# Patient Record
Sex: MALE | Race: WHITE | NOT HISPANIC OR LATINO
[De-identification: names, ages, dates, MRNs, and addresses within clinical notes are randomized per-mention and may not be internally consistent; named-entity substitution may affect disease eponyms.]

---

## 2017-03-09 ENCOUNTER — APPOINTMENT (OUTPATIENT)
Dept: PULMONOLOGY | Facility: CLINIC | Age: 77
End: 2017-03-09

## 2017-03-09 VITALS
SYSTOLIC BLOOD PRESSURE: 130 MMHG | HEART RATE: 86 BPM | RESPIRATION RATE: 16 BRPM | DIASTOLIC BLOOD PRESSURE: 70 MMHG | OXYGEN SATURATION: 95 %

## 2017-05-24 ENCOUNTER — APPOINTMENT (OUTPATIENT)
Dept: PULMONOLOGY | Facility: CLINIC | Age: 77
End: 2017-05-24

## 2017-05-24 VITALS — DIASTOLIC BLOOD PRESSURE: 80 MMHG | SYSTOLIC BLOOD PRESSURE: 122 MMHG

## 2017-05-24 VITALS — BODY MASS INDEX: 25.54 KG/M2 | WEIGHT: 178 LBS

## 2017-05-24 VITALS — OXYGEN SATURATION: 97 %

## 2017-05-24 RX ORDER — GALANTAMINE 8 MG/1
8 CAPSULE, EXTENDED RELEASE ORAL
Qty: 90 | Refills: 0 | Status: ACTIVE | COMMUNITY
Start: 2017-01-09

## 2017-05-24 RX ORDER — OFLOXACIN 3 MG/ML
0.3 SOLUTION/ DROPS OPHTHALMIC
Qty: 10 | Refills: 0 | Status: DISCONTINUED | COMMUNITY
Start: 2017-02-21

## 2017-05-24 RX ORDER — NEOMYCIN SULFATE, POLYMYXIN B SULFATE AND DEXAMETHASONE 3.5; 10000; 1 MG/ML; [USP'U]/ML; MG/ML
3.5-10000-0.1 SUSPENSION OPHTHALMIC
Qty: 5 | Refills: 0 | Status: DISCONTINUED | COMMUNITY
Start: 2017-02-21

## 2017-05-24 RX ORDER — PREDNISONE 50 MG/1
50 TABLET ORAL
Qty: 5 | Refills: 0 | Status: DISCONTINUED | COMMUNITY
Start: 2017-03-09 | End: 2017-05-24

## 2017-05-24 RX ORDER — NEOMYCIN AND POLYMYXIN B SULFATES AND DEXAMETHASONE 3.5; 10000; 1 MG/G; [IU]/G; MG/G
3.5-10000-0.1 OINTMENT OPHTHALMIC
Qty: 35 | Refills: 0 | Status: DISCONTINUED | COMMUNITY
Start: 2017-02-17

## 2017-05-24 RX ORDER — KETOROLAC TROMETHAMINE 5 MG/ML
0.5 SOLUTION OPHTHALMIC
Qty: 5 | Refills: 0 | Status: DISCONTINUED | COMMUNITY
Start: 2017-02-21

## 2017-05-24 RX ORDER — ERYTHROMYCIN 5 MG/G
5 OINTMENT OPHTHALMIC
Qty: 7 | Refills: 0 | Status: DISCONTINUED | COMMUNITY
Start: 2017-02-16

## 2017-09-22 ENCOUNTER — APPOINTMENT (OUTPATIENT)
Dept: PULMONOLOGY | Facility: CLINIC | Age: 77
End: 2017-09-22

## 2017-09-28 ENCOUNTER — APPOINTMENT (OUTPATIENT)
Dept: PULMONOLOGY | Facility: CLINIC | Age: 77
End: 2017-09-28
Payer: MEDICARE

## 2017-09-28 VITALS — BODY MASS INDEX: 24.68 KG/M2 | WEIGHT: 172 LBS | DIASTOLIC BLOOD PRESSURE: 80 MMHG | SYSTOLIC BLOOD PRESSURE: 132 MMHG

## 2017-09-28 PROCEDURE — 99214 OFFICE O/P EST MOD 30 MIN: CPT

## 2017-11-29 ENCOUNTER — APPOINTMENT (OUTPATIENT)
Dept: PULMONOLOGY | Facility: CLINIC | Age: 77
End: 2017-11-29
Payer: MEDICARE

## 2017-11-29 VITALS — DIASTOLIC BLOOD PRESSURE: 60 MMHG | HEART RATE: 85 BPM | SYSTOLIC BLOOD PRESSURE: 118 MMHG

## 2017-11-29 VITALS — WEIGHT: 171 LBS | BODY MASS INDEX: 24.54 KG/M2

## 2017-11-29 DIAGNOSIS — Z01.818 ENCOUNTER FOR OTHER PREPROCEDURAL EXAMINATION: ICD-10-CM

## 2017-11-29 PROCEDURE — 94010 BREATHING CAPACITY TEST: CPT

## 2017-11-29 PROCEDURE — 99214 OFFICE O/P EST MOD 30 MIN: CPT | Mod: 25

## 2017-11-29 RX ORDER — TIOTROPIUM BROMIDE 18 UG/1
CAPSULE ORAL; RESPIRATORY (INHALATION)
Refills: 0 | Status: ACTIVE | COMMUNITY

## 2018-04-17 ENCOUNTER — RX RENEWAL (OUTPATIENT)
Age: 78
End: 2018-04-17

## 2018-05-30 ENCOUNTER — APPOINTMENT (OUTPATIENT)
Dept: PULMONOLOGY | Facility: CLINIC | Age: 78
End: 2018-05-30
Payer: MEDICARE

## 2018-05-30 VITALS
OXYGEN SATURATION: 93 % | HEART RATE: 87 BPM | HEIGHT: 70 IN | BODY MASS INDEX: 23.91 KG/M2 | SYSTOLIC BLOOD PRESSURE: 118 MMHG | DIASTOLIC BLOOD PRESSURE: 70 MMHG | WEIGHT: 167 LBS

## 2018-05-30 PROCEDURE — 99214 OFFICE O/P EST MOD 30 MIN: CPT

## 2018-07-30 RX ORDER — ALBUTEROL SULFATE 108 UG/1
108 (90 BASE) AEROSOL, METERED RESPIRATORY (INHALATION)
Qty: 1 | Refills: 5 | Status: DISCONTINUED | COMMUNITY
Start: 2017-03-09 | End: 2018-07-30

## 2018-11-26 ENCOUNTER — APPOINTMENT (OUTPATIENT)
Dept: PULMONOLOGY | Facility: CLINIC | Age: 78
End: 2018-11-26
Payer: MEDICARE

## 2018-11-26 VITALS — DIASTOLIC BLOOD PRESSURE: 60 MMHG | SYSTOLIC BLOOD PRESSURE: 120 MMHG | WEIGHT: 157 LBS | BODY MASS INDEX: 22.53 KG/M2

## 2018-11-26 VITALS — HEART RATE: 83 BPM | OXYGEN SATURATION: 94 %

## 2018-11-26 PROCEDURE — 99214 OFFICE O/P EST MOD 30 MIN: CPT

## 2019-05-13 ENCOUNTER — RX RENEWAL (OUTPATIENT)
Age: 79
End: 2019-05-13

## 2019-05-13 RX ORDER — ALBUTEROL SULFATE 90 UG/1
108 (90 BASE) AEROSOL, METERED RESPIRATORY (INHALATION) EVERY 4 HOURS
Qty: 3 | Refills: 1 | Status: ACTIVE | COMMUNITY
Start: 2018-04-17 | End: 1900-01-01

## 2019-05-22 ENCOUNTER — APPOINTMENT (OUTPATIENT)
Dept: PULMONOLOGY | Facility: CLINIC | Age: 79
End: 2019-05-22

## 2019-06-20 ENCOUNTER — INPATIENT (INPATIENT)
Facility: HOSPITAL | Age: 79
LOS: 0 days | Discharge: HOME | DRG: 641 | End: 2019-06-21
Attending: INTERNAL MEDICINE | Admitting: HOSPITALIST
Payer: COMMERCIAL

## 2019-06-20 VITALS
HEIGHT: 71 IN | HEART RATE: 73 BPM | WEIGHT: 179.9 LBS | RESPIRATION RATE: 16 BRPM | DIASTOLIC BLOOD PRESSURE: 80 MMHG | OXYGEN SATURATION: 94 % | SYSTOLIC BLOOD PRESSURE: 121 MMHG | TEMPERATURE: 98 F

## 2019-06-20 DIAGNOSIS — R41.82 ALTERED MENTAL STATUS, UNSPECIFIED: ICD-10-CM

## 2019-06-20 LAB
ALBUMIN SERPL ELPH-MCNC: 3.9 G/DL — SIGNIFICANT CHANGE UP (ref 3.3–5.2)
ALP SERPL-CCNC: 100 U/L — SIGNIFICANT CHANGE UP (ref 40–120)
ALT FLD-CCNC: 15 U/L — SIGNIFICANT CHANGE UP
ANION GAP SERPL CALC-SCNC: 9 MMOL/L — SIGNIFICANT CHANGE UP (ref 5–17)
APPEARANCE UR: CLEAR — SIGNIFICANT CHANGE UP
APTT BLD: 37.3 SEC — HIGH (ref 27.5–36.3)
AST SERPL-CCNC: 16 U/L — SIGNIFICANT CHANGE UP
BASOPHILS # BLD AUTO: 0 K/UL — SIGNIFICANT CHANGE UP (ref 0–0.2)
BASOPHILS NFR BLD AUTO: 0.2 % — SIGNIFICANT CHANGE UP (ref 0–2)
BILIRUB SERPL-MCNC: 0.4 MG/DL — SIGNIFICANT CHANGE UP (ref 0.4–2)
BILIRUB UR-MCNC: NEGATIVE — SIGNIFICANT CHANGE UP
BUN SERPL-MCNC: 28 MG/DL — HIGH (ref 8–20)
CALCIUM SERPL-MCNC: 10.4 MG/DL — HIGH (ref 8.6–10.2)
CHLORIDE SERPL-SCNC: 104 MMOL/L — SIGNIFICANT CHANGE UP (ref 98–107)
CO2 SERPL-SCNC: 30 MMOL/L — HIGH (ref 22–29)
COLOR SPEC: YELLOW — SIGNIFICANT CHANGE UP
CREAT SERPL-MCNC: 0.96 MG/DL — SIGNIFICANT CHANGE UP (ref 0.5–1.3)
DIFF PNL FLD: NEGATIVE — SIGNIFICANT CHANGE UP
EOSINOPHIL # BLD AUTO: 0.2 K/UL — SIGNIFICANT CHANGE UP (ref 0–0.5)
EOSINOPHIL NFR BLD AUTO: 2 % — SIGNIFICANT CHANGE UP (ref 0–5)
GAS PNL BLDV: SIGNIFICANT CHANGE UP
GLUCOSE BLDC GLUCOMTR-MCNC: 88 MG/DL — SIGNIFICANT CHANGE UP (ref 70–99)
GLUCOSE SERPL-MCNC: 94 MG/DL — SIGNIFICANT CHANGE UP (ref 70–115)
GLUCOSE UR QL: NEGATIVE MG/DL — SIGNIFICANT CHANGE UP
HCT VFR BLD CALC: 37.6 % — LOW (ref 42–52)
HGB BLD-MCNC: 11.9 G/DL — LOW (ref 14–18)
INR BLD: 1.07 RATIO — SIGNIFICANT CHANGE UP (ref 0.88–1.16)
KETONES UR-MCNC: NEGATIVE — SIGNIFICANT CHANGE UP
LEUKOCYTE ESTERASE UR-ACNC: NEGATIVE — SIGNIFICANT CHANGE UP
LYMPHOCYTES # BLD AUTO: 1.2 K/UL — SIGNIFICANT CHANGE UP (ref 1–4.8)
LYMPHOCYTES # BLD AUTO: 13.6 % — LOW (ref 20–55)
MCHC RBC-ENTMCNC: 29.7 PG — SIGNIFICANT CHANGE UP (ref 27–31)
MCHC RBC-ENTMCNC: 31.6 G/DL — LOW (ref 32–36)
MCV RBC AUTO: 93.8 FL — SIGNIFICANT CHANGE UP (ref 80–94)
MONOCYTES # BLD AUTO: 0.8 K/UL — SIGNIFICANT CHANGE UP (ref 0–0.8)
MONOCYTES NFR BLD AUTO: 9 % — SIGNIFICANT CHANGE UP (ref 3–10)
NEUTROPHILS # BLD AUTO: 6.4 K/UL — SIGNIFICANT CHANGE UP (ref 1.8–8)
NEUTROPHILS NFR BLD AUTO: 75 % — HIGH (ref 37–73)
NITRITE UR-MCNC: NEGATIVE — SIGNIFICANT CHANGE UP
PH UR: 6.5 — SIGNIFICANT CHANGE UP (ref 5–8)
PLATELET # BLD AUTO: 163 K/UL — SIGNIFICANT CHANGE UP (ref 150–400)
POTASSIUM SERPL-MCNC: 5 MMOL/L — SIGNIFICANT CHANGE UP (ref 3.5–5.3)
POTASSIUM SERPL-SCNC: 5 MMOL/L — SIGNIFICANT CHANGE UP (ref 3.5–5.3)
PROT SERPL-MCNC: 6.2 G/DL — LOW (ref 6.6–8.7)
PROT UR-MCNC: NEGATIVE MG/DL — SIGNIFICANT CHANGE UP
PROTHROM AB SERPL-ACNC: 12.3 SEC — SIGNIFICANT CHANGE UP (ref 10–12.9)
RBC # BLD: 4.01 M/UL — LOW (ref 4.6–6.2)
RBC # FLD: 16.5 % — HIGH (ref 11–15.6)
SODIUM SERPL-SCNC: 143 MMOL/L — SIGNIFICANT CHANGE UP (ref 135–145)
SP GR SPEC: 1.01 — SIGNIFICANT CHANGE UP (ref 1.01–1.02)
UROBILINOGEN FLD QL: NEGATIVE MG/DL — SIGNIFICANT CHANGE UP
WBC # BLD: 8.5 K/UL — SIGNIFICANT CHANGE UP (ref 4.8–10.8)
WBC # FLD AUTO: 8.5 K/UL — SIGNIFICANT CHANGE UP (ref 4.8–10.8)

## 2019-06-20 PROCEDURE — 93010 ELECTROCARDIOGRAM REPORT: CPT

## 2019-06-20 PROCEDURE — 71045 X-RAY EXAM CHEST 1 VIEW: CPT | Mod: 26

## 2019-06-20 PROCEDURE — 99285 EMERGENCY DEPT VISIT HI MDM: CPT | Mod: GC

## 2019-06-20 PROCEDURE — 70450 CT HEAD/BRAIN W/O DYE: CPT | Mod: 26

## 2019-06-20 PROCEDURE — 99223 1ST HOSP IP/OBS HIGH 75: CPT | Mod: GC

## 2019-06-20 RX ORDER — ASPIRIN/CALCIUM CARB/MAGNESIUM 324 MG
81 TABLET ORAL ONCE
Refills: 0 | Status: COMPLETED | OUTPATIENT
Start: 2019-06-20 | End: 2019-06-20

## 2019-06-20 RX ORDER — SODIUM CHLORIDE 9 MG/ML
1000 INJECTION INTRAMUSCULAR; INTRAVENOUS; SUBCUTANEOUS ONCE
Refills: 0 | Status: COMPLETED | OUTPATIENT
Start: 2019-06-20 | End: 2019-06-20

## 2019-06-20 RX ADMIN — Medication 81 MILLIGRAM(S): at 21:24

## 2019-06-20 RX ADMIN — SODIUM CHLORIDE 1000 MILLILITER(S): 9 INJECTION INTRAMUSCULAR; INTRAVENOUS; SUBCUTANEOUS at 17:43

## 2019-06-20 RX ADMIN — SODIUM CHLORIDE 1000 MILLILITER(S): 9 INJECTION INTRAMUSCULAR; INTRAVENOUS; SUBCUTANEOUS at 19:30

## 2019-06-20 NOTE — H&P ADULT - NSHPLABSRESULTS_GEN_ALL_CORE
< from: CT Brain Stroke Protocol (06.20.19 @ 17:26) >    IMPRESSION:  Mild chronic microvascular changes without evidence of an   acute transcortical infarction or hemorrhage. MR is a more sensitive   imaging modality for the evaluation of an acute infarction. Findings   discussed with Dr. Nicole at 5:26 PM.    < end of copied text >

## 2019-06-20 NOTE — ED ADULT NURSE NOTE - NS ED NOTE ABUSE RESPONSE YN
Reason for Disposition   Back pain  (all triage questions negative)    Protocols used: ST BACK PAIN-A-AH    Pt calling with concerns of body aches and stool looser than normal (pt doesn't think it is diarrhea, no fever).  Advised pt to take Tylenol for aches, no NSAIDS.  Also advised to stay hydrated due to loose stool.  
Yes

## 2019-06-20 NOTE — H&P ADULT - ASSESSMENT
78 y/o M, with PMH of HLD, COPD emphysema and dementia, 80 y/o M, with PMH of HLD, COPD emphysema and dementia who was brought in by EMS due to AMS. Patient admitted initially for stroke r/o, with code stroke activated by ED but later cancelled due to last well known out of tPA window as per ED note. Patient now A&O x 3, back to baseline with some dementia symptoms. Admitted for acute encephalopathy likely 2/2 dehydration in the setting of poor oral intake vs poly pharmacy.      Acute encephalopathy likely 2/2 dehydration in the setting of poor oral intake vs poly pharmacy.  Admit to medicine residents service with Dr Lezama/Ashley  Diet:pureed. As per nurse, wife said that patient has problem swallowing hard food  VS Q 4 hrs  Activity as tolerated 80 y/o M, with PMH of HLD, COPD emphysema and dementia who was brought in by EMS due to AMS. Patient admitted initially for stroke r/o, with code stroke activated by ED but later cancelled due to last well known out of tPA window as per ED note. Patient now A&O x 3, back to baseline with some dementia symptoms. Admitted for acute encephalopathy likely 2/2 dehydration in the setting of poor oral intake vs poly pharmacy.      Acute encephalopathy likely 2/2 dehydration in the setting of poor oral intake vs poly pharmacy vs dementia  Infectious less likely since patient has no fever, leucocytosis or other clinical signs  As per nurse, patient said that he remembers people talking to him but he didn't feel like talking to her so he didn't want to respond"  Admit to medicine residents service with Dr Lezama/Ashley  Diet: pureed. As per nurse, wife said that patient has problem swallowing hard food  VS Q 4 hrs  Neurochecks Q 4 hrs  Aspiration precautions  Fall risk protocol  Activity: bedrest  F/u neuro eval  Head CT negative for stroke    Dementia  C/w home dose memantine 10mg PO BID  Holding home dose quetiapine 25 mg PO QD since it could cause lethargy  C/w home dose galantamine 8mg.     COPD stable  C/w home dose budesonide  C/w home dose fluticasone  C/w rescue inhaler as needed, home dose    HLD  F/u am lipid panel    Prophylactic measure  Heparin 5000 UI Q 12 hrs 78 y/o M, with PMH of HLD, COPD emphysema and dementia who was brought in by EMS due to AMS. Patient admitted initially for stroke r/o, with code stroke activated by ED but later cancelled due to last well known out of tPA window as per ED note. Patient now A&O x 3, back to baseline with some dementia symptoms. Admitted for acute encephalopathy likely 2/2 dehydration in the setting of poor oral intake vs poly pharmacy.      Acute encephalopathy likely 2/2 dehydration in the setting of poor oral intake vs poly pharmacy vs dementia  Infectious less likely since patient has no fever, leucocytosis or other clinical signs  As per nurse, patient said that he remembers people talking to him but he didn't feel like talking to her so he didn't want to respond"  Admit to medicine residents service with Dr Lezama/Ashley  Diet: pureed. As per nurse, wife said that patient has problem swallowing hard food.  F/u nutrition consult  VS Q 4 hrs  Neurochecks Q 4 hrs  Aspiration precautions  Fall risk protocol  Activity: bedrest  F/u neuro eval  Head CT negative for stroke    Dementia  C/w home dose memantine 10mg PO BID  Holding home dose quetiapine 25 mg PO QD since it could cause lethargy  C/w home dose galantamine 8mg.     COPD stable  C/w home dose budesonide  C/w home dose fluticasone  C/w rescue inhaler as needed, home dose    HLD  F/u am lipid panel    Prophylactic measure  Heparin 5000 UI Q 12 hrs 80 y/o M, with PMH of HLD, COPD emphysema and dementia who was brought in by EMS due to AMS. Patient admitted initially for stroke r/o, with code stroke activated by ED but later cancelled due to last well known out of tPA window as per ED note. Patient now A&O x 3, back to baseline with some dementia symptoms. Admitted for acute encephalopathy likely 2/2 dehydration in the setting of poor oral intake vs poly pharmacy.      Acute encephalopathy likely 2/2 dehydration in the setting of poor oral intake vs poly pharmacy vs dementia  Infectious less likely since patient has no fever, leucocytosis or other clinical signs  As per nurse, patient said that he remembers people talking to him but he didn't feel like talking to her so he didn't want to respond"  Admit to medicine residents service with Dr Lezama/Ashley  Diet: pureed. As per nurse, wife said that patient has problem swallowing hard food.  F/u nutrition consult  VS Q 4 hrs  Neurochecks Q 4 hrs  Aspiration precautions  Fall risk protocol  Activity: bedrest  F/u neuro eval  Head CT negative for stroke  EKG: NSR with occasional PVCs    Dementia  C/w home dose memantine 10mg PO BID  Holding home dose quetiapine 25 mg PO QD since it could cause lethargy  C/w home dose galantamine 8mg.     COPD stable  C/w home dose budesonide  C/w home dose fluticasone  C/w rescue inhaler as needed, home dose    HLD  F/u am lipid panel    Prophylactic measure  Heparin 5000 UI Q 12 hrs 78 y/o M, with PMH of HLD, COPD emphysema and dementia who was brought in by EMS due to AMS. Patient admitted initially for stroke r/o, with code stroke activated by ED but later cancelled due to last well known out of tPA window as per ED note. Patient now A&O x 3, back to baseline with some dementia symptoms. Admitted for acute encephalopathy likely 2/2 dehydration in the setting of poor oral intake vs poly pharmacy.      Acute encephalopathy likely 2/2 dehydration in the setting of poor oral intake vs poly pharmacy vs dementia vs TIA  Infectious less likely since patient has no fever, leucocytosis or other clinical signs  As per nurse, patient said that he remembers people talking to him but he didn't feel like talking to her so he didn't want to respond"  Admit to medicine residents service with Dr Lezama/Ashley  Diet: pureed. As per nurse, wife said that patient has problem swallowing hard food.  F/u nutrition consult  VS Q 4 hrs  Neurochecks Q 4 hrs  Aspiration precautions  Fall risk protocol  Activity: bedrest  F/u neuro eval  Head CT negative for stroke  EKG: NSR with occasional PVCs  ABCDD score: 5 moderate risk (2 days stroke 4.1%, 7-da: 5.9%, 90 day: 9.8%)  Will get TTE since TIA still within the differentials  F/u Brain MRI      Dementia  C/w home dose memantine 10mg PO BID  c/w home dose quetiapine 25 mg PO QD  C/w home dose galantamine 8mg.     COPD stable  C/w home dose budesonide  C/w home dose fluticasone  C/w rescue inhaler as needed, home dose    HLD  F/u am lipid panel    Prophylactic measure  Heparin 5000 UI Q 12 hrs 80 y/o M, with PMH of HLD, COPD emphysema and dementia who was brought in by EMS due to AMS. Patient admitted initially for stroke r/o, with code stroke activated by ED but later cancelled due to last well known out of tPA window as per ED note. Patient now A&O x 3, back to baseline with some dementia symptoms. Admitted for acute encephalopathy likely 2/2 dehydration in the setting of poor oral intake vs poly pharmacy.      Acute encephalopathy likely 2/2 dehydration in the setting of poor oral intake vs poly pharmacy vs dementia vs TIA  Infectious less likely since patient has no fever, leucocytosis or other clinical signs  As per nurse, patient said that he remembers people talking to him but he didn't feel like talking to her so he didn't want to respond"  Admit to medicine residents service with Dr Lezama/Ashley  Diet: pureed. As per nurse, wife said that patient has problem swallowing hard food.  F/u nutrition consult  VS Q 4 hrs  Neurochecks Q 4 hrs  Aspiration precautions  Fall risk protocol  Activity: bedrest  F/u neuro eval  Head CT negative for stroke  EKG: NSR with occasional PVCs  ABCDD score: 5 moderate risk (2 days stroke 4.1%, 7-da: 5.9%, 90 day: 9.8%)  Will get TTE since TIA still within the differentials  F/u Brain MRI  F/u neuro consult ([facility] neuro)    Dementia  C/w home dose memantine 10mg PO BID  c/w home dose quetiapine 25 mg PO QD  C/w home dose galantamine 8mg.     COPD stable  C/w home dose budesonide  C/w home dose fluticasone  C/w rescue inhaler as needed, home dose    HLD  F/u am lipid panel    Prophylactic measure  Heparin 5000 UI Q 12 hrs

## 2019-06-20 NOTE — ED PROVIDER NOTE - CLINICAL SUMMARY MEDICAL DECISION MAKING FREE TEXT BOX
79M, reported hx of COPD/emphysema and dementia, presents via EMS from home w chief complaint of AMS. Initially noted to be altered, aphasic. Soon returned to reported baseline. Concern for TIA vs infectious etiology of AMS/Aphasia. Will obtain Ct head, labs, CXR, and will require admit for further care pending ED evaluation  Currently stable, no acute distress. Will continue to follow up and re-assess. Case discussed with Attending.  Stevan Pretty MD, PGY2 Emergency Medicine

## 2019-06-20 NOTE — ED ADULT NURSE REASSESSMENT NOTE - NS ED NURSE REASSESS COMMENT FT1
Late Note  Report given at change of shift to receiving RN Ashley Lombardi and pt endorsed to same for follow up and continuity of care.

## 2019-06-20 NOTE — ED ADULT NURSE REASSESSMENT NOTE - NS ED NURSE REASSESS COMMENT FT1
received pt at this time from ongoing shift. pt a&ox2, denies any pain/discomfort. ALVAREZ x 4, denies numbness/tingling. resp spontaneous even and unlabored, lungs clear. no ble edema. pending further tx plan. family at bedside. updated on plan of care, verbalize understanding. call bell in reach

## 2019-06-20 NOTE — ED PROVIDER NOTE - ATTENDING CONTRIBUTION TO CARE
I personally saw the patient with the resident, and completed the key components of the history and physical exam. I then discussed the management plan with the resident.     80 y/o M with PMH COPD and progressively worsening dementia, worsening abruptly over the past 2 weeks presents as Code Stroke to the ED after being unresponsive and aphasic upon waking up from a nap this afternoon. He was aphasic for EMS, but following commands in the ED, after CT scan he was speaking well and NIH score at that time was 0. Family gave history of being at his baseline dementia this morning, last known well 11 am, therefore Code Stroke cancelled as he is outside the window.     Exam: Awake, pleasant, follows commands, RRR, lungs CTA, abd soft, NT/ND, NIH score 0 on exam.    Patient with AMS, aphasia, abrupt decline in his mental status over 2 weeks, consider infectious etiology, although afebrile, will not empirically treat for infection. Will admit patient for inpatient evaluation by neurology and MRI to evaluate for CVA.

## 2019-06-20 NOTE — ED PROVIDER NOTE - PHYSICAL EXAMINATION
Full physical exam was performed after patient returned from Stat CT Head:    General: Awake, alert, oriented, no acute distress. Resting in bed.  HEENT: PERRLA EOMI. No trauma/bruising noted to head or face. No lip/tongue/throat swelling noted on exam.  CV: Regular rate and rhythm, S1/S2, no murmurs/rubs/gallops noted on exam.   Lungs: Clear to ascultation bilaterally, no wheezes/crackles/rales noted on exam. Equal chest wall excursion noted.   Abdomen: Soft, non tender, non distended, no guarding or rebound.   MSK: Full ROM of upper and lower extremities bilaterally. No tenderness to palpation to extremities. Full ROM of neck. No gross deformities noted to extremities.  Neuro: Awake, A+O x3, moving all extremities spontaneously. CN 2-12 grossly intact. No nystagmus noted. Strength and sensation grossly intact to all extremities. No pronator drift. Finger to nose wnl. Aphasia resolved. No word finding difficulty. ***NIH scale 0 at time of physical exam***  Extremities: No swelling or edema noted to extremities. No calf tenderness to palpation.   Skin: No rash or bruising noted on exam.

## 2019-06-20 NOTE — ED ADULT NURSE NOTE - CHIEF COMPLAINT QUOTE
pt arrive by ambulance with hx dementia, reports of declining mental status over past 2 weeks. MD Chanel reyes @ [id number], code stroke activated [id number]

## 2019-06-20 NOTE — ED PROVIDER NOTE - OBJECTIVE STATEMENT
79M, reported hx of emphysema and dementia, presents via EMS from home w chief complaint of AMS. Per EMS, family stated that patient has reportedly had worsening AMS over the last 2 weeks, with acute worsening today. Family not currently in ED. Per EMS, family stated that patient had waxing/waning AMS today, with last reported normal sometime earlier today. Per EMS, patient has had intermittent AMS with periods where he was not responsive to command or painful stimuli. Patient has also not spoken since EMS arrived at scene (normally verbal at baseline). No reported hx of recent illness, fevers or chills, vomiting, diarrhea. ROS currently limited from patient, as he is currently not speaking. POCT Fingerstick 80s per EMS. Vitals wnl in SS ED triage, afebrile. Patient is currently awake, but not speaking, unable to assess orientation. Patient following simple commands (eg move right arm, move left arm). Patient ordered for stat CT head, Stroke protocol initiated.   Med list includes quetipine, memantine, restasis, budesonide, asa81, fluticasone, gelantamine, albuterol, furosemide. 79M, reported hx of emphysema and dementia, presents via EMS from home w chief complaint of AMS. Per EMS, family stated that patient has reportedly had worsening AMS over the last 2 weeks, with acute worsening today. Family not currently in ED. Per EMS, family stated that patient had waxing/waning AMS today, with last reported normal sometime earlier today. Per EMS, patient has had intermittent AMS with periods where he was not responsive to command or painful stimuli. Patient has also not spoken since EMS arrived at scene (normally verbal at baseline). No reported hx of recent illness, fevers or chills, vomiting, diarrhea. ROS currently limited from patient, as he is currently not speaking. POCT Fingerstick 80s per EMS. Vitals wnl in  ED triage, afebrile. Patient is currently awake, but not speaking, unable to assess orientation. Patient following simple commands (eg move right arm, move left arm). Patient ordered for stat CT head, Stroke protocol initiated.   Addendum: Patient returned from CT and is now awake, alert, and oriented to name//location and is speaking in full sentences. Family has arrived at bedside. CT head reported to be wnl, no acute stroke. Per family, patient has had worsening mental status decline over last 2 weeks, but today noted to have acute decline. Patient was reportedly very confused during breakfast this AM, and talking less at that time. Patient went to nap around 11am, and family was then unable to arouse patient, and EMS was called around 4:30-5pm. Per EMS, was also unable to arouse patient, and he was intermittently altered en route to  ED. Per family, patient has not had hx stroke, or any prior similar hx. No reported recent fevers or chills, nausea or vomiting, chest pain, shortness of breath, abdominal pain, diarrhea or constipation, dysuria. Family states patient has not been drinking very much over last 2 weeks, and has been sleeping more as compared to baseline.     Med list includes quetipine, memantine, restasis, budesonide, asa81, fluticasone, gelantamine, albuterol, furosemide. No known drug allergies. Former smoker.   [facility] Caitlin Saavedra .  79M, reported hx of COPD/emphysema and dementia, presents via EMS from home w chief complaint of AMS. Per EMS, family stated that patient has reportedly had worsening AMS over the last 2 weeks, with acute worsening today. Family not currently in ED. Per EMS, family stated that patient had waxing/waning AMS today, with last reported normal sometime earlier today. Per EMS, patient has had intermittent AMS with periods where he was not responsive to command or painful stimuli. Patient has also not spoken since EMS arrived at scene (normally verbal at baseline). No reported hx of recent illness, fevers or chills, vomiting, diarrhea. ROS currently limited from patient, as he is currently not speaking. POCT Fingerstick 80s per EMS. Vitals wnl in  ED triage, afebrile. Patient is currently awake, but not speaking, unable to assess orientation. Patient following simple commands (eg move right arm, move left arm). Patient ordered for stat CT head, Stroke protocol initiated.     Addendum: Patient returned from CT and is now awake, alert, and oriented to name//location and is speaking in full sentences. Mild confusion. NIH scale 0 at this time after returning from CT head. Family arrived at bedside. CT head reported to be wnl, no acute stroke. Per family, patient has had worsening mental status decline over last 2 weeks, but today noted to have acute decline. Patient was reportedly very confused during breakfast this AM, and talking less at that time. Patient went to nap around 11am, and family was then unable to arouse patient (verbal, tactile stimuli), and EMS was called around 4:30-5pm. Per EMS, was also unable to arouse patient, and he was intermittently altered en route to  ED. Per family, patient has not had hx stroke, or any prior similar hx. No reported recent fevers or chills, nausea or vomiting, chest pain, shortness of breath, abdominal pain, diarrhea or constipation, dysuria. Family states patient has not been drinking very much over last 2 weeks, and has been sleeping more as compared to baseline.     Med list includes quetipine, memantine, restasis, budesonide, asa81, fluticasone, gelantamine, albuterol, furosemide.   No known drug allergies. Former smoker.   [facility] (NP[facility] Caitlin Saavedra

## 2019-06-20 NOTE — ED ADULT TRIAGE NOTE - CHIEF COMPLAINT QUOTE
pt arrive by ambulance with hx dementia, reports of declining mental status over past 2 weeks. pt arrive by ambulance with hx dementia, reports of declining mental status over past 2 weeks. MD Chanel reyes @ [phone], code stroke activated [id number]

## 2019-06-20 NOTE — H&P ADULT - HISTORY OF PRESENT ILLNESS
78 y/o M, with PMH of COPD emphysema and dementia, brought in by EMS due to AMS as per ED note. Patient is a poor historian due to dementia which he admits to. He says taht know that is in the hospital but when asked questions he says :"I heard what you say but I can't respond, I just bable". He also says that "I am dehydrated". Tried to call spouse and daughter who are listed as contact on record but no response. Voicemail left. History mostly based on the by ED note:    Patient has reportedly had worsening AMS over the last 2 weeks, with acute worsening today. Patient was confused during breakfast and then went to take a nap at around 11 am. Family was unable to arouse the patient, EMS was called around 4:30-5pm. Apparently patient is verbal at baseline but didn't speak to EMS at the time of arrival, he was difficult to arouse. Patient was intermittently altered en route to  ED. Fingerstick 80s per EMS. Per family, patient has not had hx stroke, or any prior similar hx. No reported recent fevers or chills, nausea or vomiting, chest pain, shortness of breath, abdominal pain, diarrhea or constipation, dysuria. Family states patient has not been drinking very much over last 2 weeks, and has been sleeping more as compared to baseline. 78 y/o M, with PMH of HLD, COPD emphysema and dementia, brought in by EMS due to AMS as per ED note. Patient is a poor historian due to dementia which he admits to. He says taht know that is in the hospital but when asked questions he says :"I heard what you say but I can't respond, I just bable". He also says that "I am dehydrated". Tried to call spouse and daughter who are listed as contact on record but no response. Voicemail left. History mostly based on the by ED note:    Patient has reportedly had worsening AMS over the last 2 weeks, with acute worsening today. Patient was confused during breakfast and then went to take a nap at around 11 am. Family was unable to arouse the patient, EMS was called around 4:30-5pm. Apparently patient is verbal at baseline but didn't speak to EMS at the time of arrival, he was difficult to arouse. Patient was intermittently altered en route to  ED. Fingerstick 80s per EMS. Per family, patient has not had hx stroke, or any prior similar hx. No reported recent fevers or chills, nausea or vomiting, chest pain, shortness of breath, abdominal pain, diarrhea or constipation, dysuria. Family states patient has not been drinking very much over last 2 weeks, and has been sleeping more as compared to baseline.

## 2019-06-20 NOTE — H&P ADULT - NSHPPHYSICALEXAM_GEN_ALL_CORE
Vital Signs Last 24 Hrs  T(C): 36.9 (21 Jun 2019 00:00), Max: 36.9 (20 Jun 2019 17:00)  T(F): 98.5 (21 Jun 2019 00:00), Max: 98.5 (21 Jun 2019 00:00)  HR: 75 (21 Jun 2019 00:00) (68 - 79)  BP: 170/76 (21 Jun 2019 00:00) (121/80 - 170/76)  RR: 16 (21 Jun 2019 00:00) (16 - 16)  SpO2: 96% (21 Jun 2019 00:00) (94% - 96%) room air    General: Well nourished/Well developed elderly male, NAD  Head:  Normocephalic, atraumatic  EENT:  PERRLA, EOMI, Mucosa dry, no ulcerations  Neck:  Supple, no sinuses or palpable masses  Lymphatic:  No palpable cervical, supraclavicular, axillary or inguinal adenopathy  Respiratory: Basilar right sided inspiratory crackles. No wheezing   CV: RRR, S1S2, no murmur  GI: Abdomen soft, NT, ND no palpable mass  Extremities: 2+ pitting edema, + peripheral pulses, FROM all 4 extremity  Neurology: A&Ox3, no focal signs. Naming and repetition impaired. Comprehensive aphasia. Memory impaired

## 2019-06-20 NOTE — ED PROVIDER NOTE - PROGRESS NOTE DETAILS
CT  head wnl, CXR wnl, labs grossly wnl. UA pending  Pt resting, no acute distress. Will admit for further care, MRI  Stevan Pretty MD, PGY2 Emergency Medicine

## 2019-06-20 NOTE — ED PROVIDER NOTE - NS ED MD TWO NIGHTS YN
----- Message from Jose Ferrera sent at 5/9/2017  5:08 PM CDT -----  Contact: [phone]  Pt is calling needing a patient refill on medication. Patient said it was not urgent so she would like to request a call back.       Thanks    Yes

## 2019-06-21 ENCOUNTER — TRANSCRIPTION ENCOUNTER (OUTPATIENT)
Age: 79
End: 2019-06-21

## 2019-06-21 VITALS
RESPIRATION RATE: 18 BRPM | TEMPERATURE: 98 F | HEART RATE: 76 BPM | OXYGEN SATURATION: 98 % | SYSTOLIC BLOOD PRESSURE: 108 MMHG | DIASTOLIC BLOOD PRESSURE: 56 MMHG

## 2019-06-21 LAB
AMPHET UR-MCNC: NEGATIVE — SIGNIFICANT CHANGE UP
ANION GAP SERPL CALC-SCNC: 11 MMOL/L — SIGNIFICANT CHANGE UP (ref 5–17)
BARBITURATES UR SCN-MCNC: NEGATIVE — SIGNIFICANT CHANGE UP
BENZODIAZ UR-MCNC: NEGATIVE — SIGNIFICANT CHANGE UP
BUN SERPL-MCNC: 22 MG/DL — HIGH (ref 8–20)
CALCIUM SERPL-MCNC: 9.6 MG/DL — SIGNIFICANT CHANGE UP (ref 8.6–10.2)
CHLORIDE SERPL-SCNC: 104 MMOL/L — SIGNIFICANT CHANGE UP (ref 98–107)
CHOLEST SERPL-MCNC: 180 MG/DL — SIGNIFICANT CHANGE UP (ref 110–199)
CO2 SERPL-SCNC: 27 MMOL/L — SIGNIFICANT CHANGE UP (ref 22–29)
COCAINE METAB.OTHER UR-MCNC: NEGATIVE — SIGNIFICANT CHANGE UP
CREAT SERPL-MCNC: 0.6 MG/DL — SIGNIFICANT CHANGE UP (ref 0.5–1.3)
GLUCOSE SERPL-MCNC: 95 MG/DL — SIGNIFICANT CHANGE UP (ref 70–115)
HDLC SERPL-MCNC: 63 MG/DL — SIGNIFICANT CHANGE UP
LIPID PNL WITH DIRECT LDL SERPL: 106 MG/DL — SIGNIFICANT CHANGE UP
METHADONE UR-MCNC: NEGATIVE — SIGNIFICANT CHANGE UP
NT-PROBNP SERPL-SCNC: 428 PG/ML — HIGH (ref 0–300)
OPIATES UR-MCNC: NEGATIVE — SIGNIFICANT CHANGE UP
PCP SPEC-MCNC: SIGNIFICANT CHANGE UP
PCP UR-MCNC: NEGATIVE — SIGNIFICANT CHANGE UP
POTASSIUM SERPL-MCNC: 4.2 MMOL/L — SIGNIFICANT CHANGE UP (ref 3.5–5.3)
POTASSIUM SERPL-SCNC: 4.2 MMOL/L — SIGNIFICANT CHANGE UP (ref 3.5–5.3)
SODIUM SERPL-SCNC: 142 MMOL/L — SIGNIFICANT CHANGE UP (ref 135–145)
THC UR QL: NEGATIVE — SIGNIFICANT CHANGE UP
TOTAL CHOLESTEROL/HDL RATIO MEASUREMENT: 3 RATIO — LOW (ref 3.4–9.6)
TRIGL SERPL-MCNC: 56 MG/DL — SIGNIFICANT CHANGE UP (ref 10–200)
TSH SERPL-MCNC: 1.35 UIU/ML — SIGNIFICANT CHANGE UP (ref 0.27–4.2)
VIT B12 SERPL-MCNC: 1125 PG/ML — SIGNIFICANT CHANGE UP (ref 232–1245)

## 2019-06-21 PROCEDURE — 92610 EVALUATE SWALLOWING FUNCTION: CPT

## 2019-06-21 PROCEDURE — 36415 COLL VENOUS BLD VENIPUNCTURE: CPT

## 2019-06-21 PROCEDURE — 85027 COMPLETE CBC AUTOMATED: CPT

## 2019-06-21 PROCEDURE — 82330 ASSAY OF CALCIUM: CPT

## 2019-06-21 PROCEDURE — 70551 MRI BRAIN STEM W/O DYE: CPT | Mod: 26

## 2019-06-21 PROCEDURE — 82607 VITAMIN B-12: CPT

## 2019-06-21 PROCEDURE — 70450 CT HEAD/BRAIN W/O DYE: CPT

## 2019-06-21 PROCEDURE — 83880 ASSAY OF NATRIURETIC PEPTIDE: CPT

## 2019-06-21 PROCEDURE — 93005 ELECTROCARDIOGRAM TRACING: CPT

## 2019-06-21 PROCEDURE — 71045 X-RAY EXAM CHEST 1 VIEW: CPT

## 2019-06-21 PROCEDURE — 85014 HEMATOCRIT: CPT

## 2019-06-21 PROCEDURE — 84132 ASSAY OF SERUM POTASSIUM: CPT

## 2019-06-21 PROCEDURE — 87086 URINE CULTURE/COLONY COUNT: CPT

## 2019-06-21 PROCEDURE — 82803 BLOOD GASES ANY COMBINATION: CPT

## 2019-06-21 PROCEDURE — 99285 EMERGENCY DEPT VISIT HI MDM: CPT | Mod: 25

## 2019-06-21 PROCEDURE — 82435 ASSAY OF BLOOD CHLORIDE: CPT

## 2019-06-21 PROCEDURE — 82962 GLUCOSE BLOOD TEST: CPT

## 2019-06-21 PROCEDURE — 93880 EXTRACRANIAL BILAT STUDY: CPT | Mod: 26

## 2019-06-21 PROCEDURE — 84295 ASSAY OF SERUM SODIUM: CPT

## 2019-06-21 PROCEDURE — 84443 ASSAY THYROID STIM HORMONE: CPT

## 2019-06-21 PROCEDURE — 80061 LIPID PANEL: CPT

## 2019-06-21 PROCEDURE — 93306 TTE W/DOPPLER COMPLETE: CPT | Mod: 26

## 2019-06-21 PROCEDURE — 85610 PROTHROMBIN TIME: CPT

## 2019-06-21 PROCEDURE — 99239 HOSP IP/OBS DSCHRG MGMT >30: CPT

## 2019-06-21 PROCEDURE — 83605 ASSAY OF LACTIC ACID: CPT

## 2019-06-21 PROCEDURE — 80053 COMPREHEN METABOLIC PANEL: CPT

## 2019-06-21 PROCEDURE — 70551 MRI BRAIN STEM W/O DYE: CPT

## 2019-06-21 PROCEDURE — 96360 HYDRATION IV INFUSION INIT: CPT

## 2019-06-21 PROCEDURE — 97163 PT EVAL HIGH COMPLEX 45 MIN: CPT

## 2019-06-21 PROCEDURE — 96361 HYDRATE IV INFUSION ADD-ON: CPT

## 2019-06-21 PROCEDURE — 80307 DRUG TEST PRSMV CHEM ANLYZR: CPT

## 2019-06-21 PROCEDURE — 93880 EXTRACRANIAL BILAT STUDY: CPT

## 2019-06-21 PROCEDURE — 85730 THROMBOPLASTIN TIME PARTIAL: CPT

## 2019-06-21 PROCEDURE — 81003 URINALYSIS AUTO W/O SCOPE: CPT

## 2019-06-21 PROCEDURE — 94640 AIRWAY INHALATION TREATMENT: CPT

## 2019-06-21 PROCEDURE — 82947 ASSAY GLUCOSE BLOOD QUANT: CPT

## 2019-06-21 PROCEDURE — 80048 BASIC METABOLIC PNL TOTAL CA: CPT

## 2019-06-21 PROCEDURE — 93306 TTE W/DOPPLER COMPLETE: CPT

## 2019-06-21 RX ORDER — BUDESONIDE AND FORMOTEROL FUMARATE DIHYDRATE 160; 4.5 UG/1; UG/1
2 AEROSOL RESPIRATORY (INHALATION)
Qty: 0 | Refills: 0 | DISCHARGE
Start: 2019-06-21

## 2019-06-21 RX ORDER — ATORVASTATIN CALCIUM 80 MG/1
10 TABLET, FILM COATED ORAL AT BEDTIME
Refills: 0 | Status: DISCONTINUED | OUTPATIENT
Start: 2019-06-21 | End: 2019-06-21

## 2019-06-21 RX ORDER — QUETIAPINE FUMARATE 200 MG/1
25 TABLET, FILM COATED ORAL AT BEDTIME
Refills: 0 | Status: DISCONTINUED | OUTPATIENT
Start: 2019-06-21 | End: 2019-06-21

## 2019-06-21 RX ORDER — BUDESONIDE AND FORMOTEROL FUMARATE DIHYDRATE 160; 4.5 UG/1; UG/1
2 AEROSOL RESPIRATORY (INHALATION)
Refills: 0 | Status: DISCONTINUED | OUTPATIENT
Start: 2019-06-21 | End: 2019-06-21

## 2019-06-21 RX ORDER — GALANTAMINE HYDROBROMIDE 4 MG/1
8 TABLET, FILM COATED ORAL AT BEDTIME
Refills: 0 | Status: DISCONTINUED | OUTPATIENT
Start: 2019-06-21 | End: 2019-06-21

## 2019-06-21 RX ORDER — IPRATROPIUM/ALBUTEROL SULFATE 18-103MCG
3 AEROSOL WITH ADAPTER (GRAM) INHALATION EVERY 8 HOURS
Refills: 0 | Status: DISCONTINUED | OUTPATIENT
Start: 2019-06-21 | End: 2019-06-21

## 2019-06-21 RX ORDER — FUROSEMIDE 40 MG
1 TABLET ORAL
Qty: 0 | Refills: 0 | DISCHARGE

## 2019-06-21 RX ORDER — MEMANTINE HYDROCHLORIDE 10 MG/1
10 TABLET ORAL
Refills: 0 | Status: DISCONTINUED | OUTPATIENT
Start: 2019-06-21 | End: 2019-06-21

## 2019-06-21 RX ORDER — SODIUM CHLORIDE 9 MG/ML
1000 INJECTION, SOLUTION INTRAVENOUS
Refills: 0 | Status: COMPLETED | OUTPATIENT
Start: 2019-06-21 | End: 2019-06-21

## 2019-06-21 RX ORDER — ATORVASTATIN CALCIUM 80 MG/1
1 TABLET, FILM COATED ORAL
Qty: 0 | Refills: 0 | DISCHARGE
Start: 2019-06-21

## 2019-06-21 RX ORDER — HEPARIN SODIUM 5000 [USP'U]/ML
5000 INJECTION INTRAVENOUS; SUBCUTANEOUS EVERY 12 HOURS
Refills: 0 | Status: DISCONTINUED | OUTPATIENT
Start: 2019-06-21 | End: 2019-06-21

## 2019-06-21 RX ORDER — HYDRALAZINE HCL 50 MG
5 TABLET ORAL ONCE
Refills: 0 | Status: COMPLETED | OUTPATIENT
Start: 2019-06-21 | End: 2019-06-21

## 2019-06-21 RX ORDER — GALANTAMINE HYDROBROMIDE 4 MG/1
8 TABLET, FILM COATED ORAL
Refills: 0 | Status: DISCONTINUED | OUTPATIENT
Start: 2019-06-21 | End: 2019-06-21

## 2019-06-21 RX ORDER — FLUTICASONE PROPIONATE 220 MCG
2 AEROSOL WITH ADAPTER (GRAM) INHALATION
Refills: 0 | Status: DISCONTINUED | OUTPATIENT
Start: 2019-06-21 | End: 2019-06-21

## 2019-06-21 RX ORDER — CYCLOSPORINE 0.5 MG/ML
1 EMULSION OPHTHALMIC
Qty: 0 | Refills: 0 | DISCHARGE

## 2019-06-21 RX ORDER — ASPIRIN/CALCIUM CARB/MAGNESIUM 324 MG
81 TABLET ORAL DAILY
Refills: 0 | Status: DISCONTINUED | OUTPATIENT
Start: 2019-06-21 | End: 2019-06-21

## 2019-06-21 RX ORDER — QUETIAPINE FUMARATE 200 MG/1
12.5 TABLET, FILM COATED ORAL AT BEDTIME
Refills: 0 | Status: DISCONTINUED | OUTPATIENT
Start: 2019-06-21 | End: 2019-06-21

## 2019-06-21 RX ADMIN — MEMANTINE HYDROCHLORIDE 10 MILLIGRAM(S): 10 TABLET ORAL at 18:01

## 2019-06-21 RX ADMIN — Medication 81 MILLIGRAM(S): at 18:01

## 2019-06-21 RX ADMIN — Medication 3 MILLILITER(S): at 15:36

## 2019-06-21 RX ADMIN — Medication 5 MILLIGRAM(S): at 03:55

## 2019-06-21 RX ADMIN — Medication 1 DROP(S): at 18:05

## 2019-06-21 RX ADMIN — Medication 1 TABLET(S): at 18:01

## 2019-06-21 RX ADMIN — MEMANTINE HYDROCHLORIDE 10 MILLIGRAM(S): 10 TABLET ORAL at 05:16

## 2019-06-21 RX ADMIN — HEPARIN SODIUM 5000 UNIT(S): 5000 INJECTION INTRAVENOUS; SUBCUTANEOUS at 05:16

## 2019-06-21 RX ADMIN — SODIUM CHLORIDE 80 MILLILITER(S): 9 INJECTION, SOLUTION INTRAVENOUS at 03:01

## 2019-06-21 RX ADMIN — HEPARIN SODIUM 5000 UNIT(S): 5000 INJECTION INTRAVENOUS; SUBCUTANEOUS at 18:03

## 2019-06-21 NOTE — DISCHARGE NOTE PROVIDER - NSDCCPCAREPLAN_GEN_ALL_CORE_FT
PRINCIPAL DISCHARGE DIAGNOSIS  Diagnosis: Altered mental status, unspecified  Assessment and Plan of Treatment: You had      SECONDARY DISCHARGE DIAGNOSES  Diagnosis: Aphasia  Assessment and Plan of Treatment: PRINCIPAL DISCHARGE DIAGNOSIS  Diagnosis: Altered mental status, unspecified  Assessment and Plan of Treatment: You had symotoms of weakness, lethargy and confusion because you were dehydrated. You were given fluids and have your symptoms have improved.   Imaging were also done to rule out a stroke. All your tests were negative. The imaging of the arteries in your neck showed mild plaque build up, no intervention needed at this time.  Please follow up with your primary care physician 1 week and after discharge and with the Neurologist 2 week after discharge.  If you have similar symptoms again, please see the Neurologist to have an EEG of your brain.   Please stay hydrated by given adequate water daily.      SECONDARY DISCHARGE DIAGNOSES  Diagnosis: Alzheimer's dementia  Assessment and Plan of Treatment: Please continue taking your medications.   Follow up with your Primary Care Doctor 1 week after discharge.    Diagnosis: COPD without exacerbation  Assessment and Plan of Treatment: Please continue taking your medications.   Follow up with your Pulmonologist 1-2 weeks after discharge.    Diagnosis: HLD (hyperlipidemia)  Assessment and Plan of Treatment: Please continue taking your medications.   Follow up with your Primary Care Doctor 1 week after discharge. PRINCIPAL DISCHARGE DIAGNOSIS  Diagnosis: Altered mental status, unspecified  Assessment and Plan of Treatment: You had symotoms of weakness, lethargy and confusion because you were dehydrated. You were given fluids and have your symptoms have improved.   Imaging were also done to rule out a stroke. All your tests were negative. The imaging of the arteries in your neck showed mild plaque build up, no intervention needed at this time.  Please follow up with your primary care physician 1 week and after discharge and with the Neurologist 2 week after discharge.  If you have similar symptoms again, please see the Neurologist to have an EEG of your brain.   Please stay hydrated by drinking adequate water daily.      SECONDARY DISCHARGE DIAGNOSES  Diagnosis: Alzheimer's dementia  Assessment and Plan of Treatment: Please continue taking your medications.   Follow up with your Primary Care Doctor 1 week after discharge.    Diagnosis: COPD without exacerbation  Assessment and Plan of Treatment: Please continue taking your medications.   Follow up with your Pulmonologist 1-2 weeks after discharge.    Diagnosis: HLD (hyperlipidemia)  Assessment and Plan of Treatment: Please continue taking your medications.   Follow up with your Primary Care Doctor 1 week after discharge. PRINCIPAL DISCHARGE DIAGNOSIS  Diagnosis: Altered mental status, unspecified  Assessment and Plan of Treatment: You had symotoms of weakness, lethargy and confusion because you were dehydrated. You were given fluids and have your symptoms have improved.   Imaging were also done to rule out a stroke. All your tests were negative. The imaging of the arteries in your neck showed mild plaque build up, no intervention needed at this time.  Please follow up with your primary care physician 1 week and after discharge and with the Neurologist 2 week after discharge.  If you have similar symptoms again, please see the Neurologist to have an EEG of your brain.   Please stay hydrated by drinking adequate water daily.      SECONDARY DISCHARGE DIAGNOSES  Diagnosis: COPD without exacerbation  Assessment and Plan of Treatment: Please continue taking your medications.   Follow up with your Pulmonologist 1-2 weeks after discharge.    Diagnosis: HLD (hyperlipidemia)  Assessment and Plan of Treatment: Please continue taking your medications.   Follow up with your Primary Care Doctor 1 week after discharge.    Diagnosis: Alzheimer's dementia  Assessment and Plan of Treatment: Please continue taking your medications.  Continue with supportive care.   Follow up with your Primary Care Doctor 1 week after discharge.

## 2019-06-21 NOTE — PHYSICAL THERAPY INITIAL EVALUATION ADULT - DISCHARGE DISPOSITION, PT EVAL
home w/ home PT/rehabilitation facility/Home with 24/7 assist if family willing and able to provide level of assist, vs [facility]/home w/ assist

## 2019-06-21 NOTE — DISCHARGE NOTE NURSING/CASE MANAGEMENT/SOCIAL WORK - NSDCDPATPORTLINK_GEN_ALL_CORE
You can access the [facility] Portal, offered by [facility], by registering with the following website: [email or web address]

## 2019-06-21 NOTE — DISCHARGE NOTE PROVIDER - CARE PROVIDER_API CALL
Candelario Finch)  Internal Medicine; Pulmonary Disease  Pulmonary Medicine at [place], [address]  [place] [address]  Phone: [phone]  Fax: [phone]  Follow Up Time: 2 weeks    Karan Mcmahon)  Neurology  [address] [place] [address]  Phone: [phone]  Fax: [phone]  Follow Up Time: 2 weeks    Dr. Pruett,   [facility]  Phone: (   )    -  Fax: (   )    -  Follow Up Time: 1 week

## 2019-06-21 NOTE — PROGRESS NOTE ADULT - ASSESSMENT
80 y/o M, with PMH of HLD, COPD emphysema and dementia who was brought in by EMS due to AMS. Patient admitted initially for stroke r/o, with code stroke activated by ED but later cancelled due to last well known out of tPA window as per ED note. Patient now A&O x 3, back to baseline with some dementia symptoms. Admitted for acute encephalopathy likely 2/2 dehydration in the setting of poor oral intake vs poly pharmacy.      Acute encephalopathy likely 2/2 dehydration in the setting of poor oral intake vs poly pharmacy vs dementia vs TIA  Infectious less likely since patient has no fever, leucocytosis or other clinical signs  As per nurse, patient said that he remembers people talking to him but he didn't feel like talking to her so he didn't want to respond"  Telemetry unit  Diet: pureed. As per nurse, wife said that patient has problem swallowing hard food.  F/u nutrition consult  VS Q 4 hrs  Neurochecks Q 4 hrs  Aspiration precautions  Fall risk protocol  Activity: bedrest  F/u neuro eval  Head CT negative for stroke  EKG: NSR with occasional PVCs. F/u repeat morning  EKG  ABCDD score: 5 moderate risk (2 days stroke 4.1%, 7-da: 5.9%, 90 day: 9.8%)  Will get TTE since TIA still within the differentials  F/u Brain MRI  F/u neuro consult ([facility] neuro)  F/u Utox    Dementia  C/w home dose memantine 10mg PO BID  c/w home dose quetiapine 25 mg PO QD  C/w home dose galantamine 8mg.     COPD stable  C/w home dose budesonide  C/w home dose fluticasone  C/w rescue inhaler as needed, home dose    HLD  F/u am lipid panel    Prophylactic measure  Heparin 5000 UI Q 12 hrs

## 2019-06-21 NOTE — DISCHARGE NOTE NURSING/CASE MANAGEMENT/SOCIAL WORK - NSDCFUADDAPPT_GEN_ALL_CORE_FT
-If symptoms recur. Please follow up with the Neurologist for EEG of the brain.     CT head   IMPRESSION:  Mild chronic microvascular changes without evidence of an   acute transcortical infarction or hemorrhage. MR is a more sensitive   imaging modality for the evaluation of an acute infarction.     MRI head  IMPRESSION: No acute infarct, hemorrhage, or mass effect.     Duplex US carotids   IMPRESSION: Mild plaque in b/l carotid bulbs without a hemodynamic abnormality.                          11.9   8.5   )-----------( 163      ( 20 Jun 2019 17:40 )             37.6       Vitamin B12, Serum: 1125 pg/mL      Thyroid Stimulating Hormone, Serum: 1.35 uIU/mL

## 2019-06-21 NOTE — SWALLOW BEDSIDE ASSESSMENT ADULT - SWALLOW EVAL: RECOMMENDED FEEDING/EATING TECHNIQUES
position upright (90 degrees)/oral hygiene/small sips/bites/maintain upright posture during/after eating for 30 mins/crush medication (when feasible)

## 2019-06-21 NOTE — DISCHARGE NOTE PROVIDER - NSDCFUADDAPPT_GEN_ALL_CORE_FT
CT head   IMPRESSION:  Mild chronic microvascular changes without evidence of an   acute transcortical infarction or hemorrhage. MR is a more sensitive   imaging modality for the evaluation of an acute infarction.     MRI head  IMPRESSION: No acute infarct, hemorrhage, or mass effect.     Duplex US carotids   IMPRESSION: Mild plaque in b/l carotid bulbs without a hemodynamic abnormality.                          11.9   8.5   )-----------( 163      ( 20 Jun 2019 17:40 )             37.6       Vitamin B12, Serum: 1125 pg/mL      Thyroid Stimulating Hormone, Serum: 1.35 uIU/mL -If symptoms recur. Please follow up with the Neurologist for EEG of the brain.     CT head   IMPRESSION:  Mild chronic microvascular changes without evidence of an   acute transcortical infarction or hemorrhage. MR is a more sensitive   imaging modality for the evaluation of an acute infarction.     MRI head  IMPRESSION: No acute infarct, hemorrhage, or mass effect.     Duplex US carotids   IMPRESSION: Mild plaque in b/l carotid bulbs without a hemodynamic abnormality.                          11.9   8.5   )-----------( 163      ( 20 Jun 2019 17:40 )             37.6       Vitamin B12, Serum: 1125 pg/mL      Thyroid Stimulating Hormone, Serum: 1.35 uIU/mL

## 2019-06-21 NOTE — SWALLOW BEDSIDE ASSESSMENT ADULT - ASR SWALLOW ASPIRATION MONITOR
fever/throat clearing/oral hygiene/pneumonia/cough/upper respiratory infection/change of breathing pattern/position upright (90Y)/gurgly voice

## 2019-06-21 NOTE — SWALLOW BEDSIDE ASSESSMENT ADULT - SLP PERTINENT HISTORY OF CURRENT PROBLEM
As per h&p: "Patient admitted initially for stroke r/o, with code stroke activated by ED but later cancelled due to last well known out of tPA window as per ED note. Patient now A&O x 3, back to baseline with some dementia symptoms. Admitted for acute encephalopathy likely 2/2 dehydration in the setting of poor oral intake vs poly pharmacy"

## 2019-06-21 NOTE — DISCHARGE NOTE PROVIDER - HOSPITAL COURSE
78 y/o M, with PMH of HLD, COPD emphysema and dementia who was brought in by EMS due to AMS. Patient admitted initially for stroke r/o, with code stroke activated by ED but later cancelled due to last well known out of tPA window as per ED note. Patient now A&O x 3, back to baseline with some dementia symptoms. Admitted for acute encephalopathy likely 2/2 dehydration in the setting of poor oral intake vs poly pharmacy. Pt is now back to baseline, per family at bedside pt has been less active and noted to be sleeping more frequently. Of note, pt is eating meals but has reduced liquid/water intake. Current symptoms could also be due to worsening dementia. CT and MRI head negative. Neurology consulted noted, recommends outpt EEG if symptoms recurs. Duplex US carotids, mild plaque in b/l carotid bulbs. TTE showed LVEF of 50-55%. Pt with h/o dementia on medications, initial concerns for polypharmacy, however, family clarified that he has been taking the medications at length without prior side effects. Pt to continue taking medications after d/c. Pt with h/o COPD and HLD, to continue with home medications. Lipid panel, TSH and B12 wnl. PT consult noted , recommended home assist w/ home PT versus [facility]. Per conversation with daughter (HCP), [facility] would not be beneficial due to pt's worsening dementia. Pt is already receiving home aide services and home PT.        All electrolyte abnormalities were monitored carefully and repleted as necessary during this hospitalization. At the time of discharge patient was hemodynamically stable and amenable to all terms of discharge. The patient's family has received verbal instructions from myself regarding discharge plans.         Length of Discharge: 45MIN 78 y/o M, with PMH of HLD, COPD emphysema and dementia who was brought in by EMS due to AMS. Patient admitted initially for stroke r/o, with code stroke activated by ED but later cancelled due to last well known out of tPA window as per ED note. Patient now A&O x 3, back to baseline with some dementia symptoms. Admitted for acute encephalopathy likely 2/2 dehydration in the setting of poor oral intake vs poly pharmacy. Pt is now back to baseline, per family at bedside pt has been less active and noted to be sleeping more frequently. Of note, pt is eating meals but has reduced liquid/water intake. Current symptoms could also be due to worsening dementia. CT and MRI head negative. Neurology consulted noted, recommends outpt EEG if symptoms recurs. Duplex US carotids, mild plaque in b/l carotid bulbs. TTE showed LVEF of 50-55%. Pt with h/o dementia on medications, initial concerns for polypharmacy, however, family clarified that he has been taking the medications at length without prior side effects. Pt to continue taking medications after d/c. Pt with h/o COPD and HLD, to continue with home medications. Lipid panel, TSH and B12 wnl. PT consult noted , recommended home assist w/ home PT versus [facility]. Per conversation with daughter (HCP), [facility] would not be beneficial due to pt's worsening dementia. Pt is already receiving home aide services and home PT. Pt to f/u with PMD, Neurology and Pulmonology after discharge.         All electrolyte abnormalities were monitored carefully and repleted as necessary during this hospitalization. At the time of discharge patient was hemodynamically stable and amenable to all terms of discharge. The patient's family has received verbal instructions from myself regarding discharge plans.         Length of Discharge: 45MIN 78 y/o M, with PMH of HLD, COPD emphysema and dementia who was brought in by EMS due to AMS. Patient admitted initially for stroke r/o, with code stroke activated by ED but later cancelled due to last well known out of tPA window as per ED note. Patient now A&O x 3, back to baseline with some dementia symptoms. Admitted for acute encephalopathy with episode of unresponsiveness likely 2/2 dehydration in the setting of poor oral intake vs poly pharmacy. S/p IVF with improvement in sx to baseline dementia. Pt is now back to baseline, per family at bedside pt has been less active and noted to be sleeping more frequently. Of note, pt is eating meals but has reduced liquid/water intake. Current symptoms could also be due to worsening dementia. CT and MRI head negative. Neurology consulted noted, recommends outpt EEG if symptoms recurs. Duplex US carotids, mild plaque in b/l carotid bulbs. TTE showed LVEF of 50-55%. Pt with h/o dementia on medications, initial concerns for polypharmacy, however, family clarified that he has been taking the medications at length without prior side effects. Pt to continue taking medications after d/c. Pt with h/o COPD and HLD, to continue with home medications. Lipid panel, TSH and B12 wnl. PT consult noted , recommended home assist w/ home PT versus [facility]. Per conversation with daughter (HCP), [facility] would not be beneficial due to pt's worsening dementia. Pt is already receiving home aide services and home PT. Pt to f/u with PMD, Neurology and Pulmonology after discharge.         All electrolyte abnormalities were monitored carefully and repleted as necessary during this hospitalization. At the time of discharge patient was hemodynamically stable and amenable to all terms of discharge. The patient's family has received verbal instructions from myself regarding discharge plans.         Length of Discharge: 45MIN

## 2019-06-21 NOTE — PHYSICAL THERAPY INITIAL EVALUATION ADULT - ADDITIONAL COMMENTS
Pt is a poor historian, reports living with wife in a 2 story house with 3 steps, reports having a HHA, twice a week, unable to reports hours.  Reports amb with [facility], PTA.

## 2019-06-21 NOTE — CONSULT NOTE ADULT - SUBJECTIVE AND OBJECTIVE BOX
CHIEF COMPLAINT: confusion    HPI: 79yMale  History of Present Illness: 	  78 y/o M, with PMH of HLD, COPD emphysema and dementia, brought in by EMS due to AMS as per ED note. Patient is a poor historian due to dementia which he admits to. He says taht know that is in the hospital but when asked questions he says :"I heard what you say but I can't respond, I just bable". He also says that "I am dehydrated". Tried to call spouse and daughter who are listed as contact on record but no response. Voicemail left. History mostly based on the by ED note:    Patient has reportedly had worsening AMS over the last 2 weeks, with acute worsening today. Patient was confused during breakfast and then went to take a nap at around 11 am. Family was unable to arouse the patient, EMS was called around 4:30-5pm. Apparently patient is verbal at baseline but didn't speak to EMS at the time of arrival, he was difficult to arouse. Patient was intermittently altered en route to  ED. Fingerstick 80s per EMS. Per family, patient has not had hx stroke, or any prior similar hx. No reported recent fevers or chills, nausea or vomiting, chest pain, shortness of breath, abdominal pain, diarrhea or constipation, dysuria. Family states patient has not been drinking very much over last 2 weeks, and has been sleeping more as compared to baseline.     PAST MEDICAL & SURGICAL HISTORY:  HLD (hyperlipidemia)  COPD (chronic obstructive pulmonary disease)  Dementia  No significant past surgical history    MEDICATIONS  (STANDING):  aspirin enteric coated 81 milliGRAM(s) Oral daily  fluticasone propionate  110 MICROgram(s) HFA Inhaler - Peds 2 Puff(s) Inhalation two times a day  galantamine 8 milliGRAM(s) Oral at bedtime  heparin  Injectable 5000 Unit(s) SubCutaneous every 12 hours  memantine 10 milliGRAM(s) Oral two times a day  multivitamin 1 Tablet(s) Oral daily  QUEtiapine 25 milliGRAM(s) Oral at bedtime    MEDICATIONS  (PRN):    Allergies    No Known Allergies    Intolerances        FAMILY HISTORY:          SOCIAL HISTORY:    Tobacco:    Alcohol:    Drugs:          REVIEW OF SYSTEMS:    Relevant systems are negative except as noted in the chart, HPI, and PMH      VITAL SIGNS:  Vital Signs Last 24 Hrs  T(C): 36.1 (21 Jun 2019 07:28), Max: 36.9 (20 Jun 2019 17:00)  T(F): 97 (21 Jun 2019 07:28), Max: 98.5 (21 Jun 2019 00:00)  HR: 72 (21 Jun 2019 07:28) (68 - 79)  BP: 154/83 (21 Jun 2019 07:28) (121/80 - 189/74)  BP(mean): --  RR: 16 (21 Jun 2019 07:28) (16 - 16)  SpO2: 95% (21 Jun 2019 07:28) (94% - 99%)    PHYSICAL EXAMINATION:    General: Well-developed, well nourished, in no acute distress.  Cardiac:  Regular rate and rhythm. No carotid bruits appreciated.  Eyes: Fundoscopic examination was deferred.  Neurologic:  - Mental Status:  Alert, awake, oriented to person, place, and time; Speech is fluent. Language is normal. Follows commands well.  Insight and knowledge appear appropriate.  Cranial Nerves II-XII:    II:  Visual acuity is normal for age ; Visual fields are full to confrontation; Pupils are equal, round, and reactive to light.  III, IV, VI:  Extraocular movements are intact without nystagmus.  V:  Facial sensation is intact in the V1-V3 distribution bilaterally.  VII:  Face is symmetric with normal eye closure and smile  VIII:  Hearing is grossly intact  IX, X, XII:  speech is clear  XI:  Head turning and shoulder shrug are intact.  - Motor:  Strength is 5/5 x 4.   There is no pronator drift. .  - Reflexes:  2+ and symmetric at the knees.  Plantar responses flexor.  - Sensory:  Symmetric to light touch  - Coordination:  Finger-nose-finger is normal. Rapid alternating hand and foot  movements are intact. Dexterity appears normal      LABS:                          11.9   8.5   )-----------( 163      ( 20 Jun 2019 17:40 )             37.6     21 Jun 2019 05:58    142    |  104    |  22.0   ----------------------------<  95     4.2     |  27.0   |  0.60     Ca    9.6        21 Jun 2019 05:58    TPro  6.2    /  Alb  3.9    /  TBili  0.4    /  DBili  x      /  AST  16     /  ALT  15     /  AlkPhos  100    20 Jun 2019 17:40    LIVER FUNCTIONS - ( 20 Jun 2019 17:40 )  Alb: 3.9 g/dL / Pro: 6.2 g/dL / ALK PHOS: 100 U/L / ALT: 15 U/L / AST: 16 U/L / GGT: x           PT/INR - ( 20 Jun 2019 17:40 )   PT: 12.3 sec;   INR: 1.07 ratio         PTT - ( 20 Jun 2019 17:40 )  PTT:37.3 sec      RADIOLOGY & ADDITIONAL STUDIES:    < from: CT Brain Stroke Protocol (06.20.19 @ 17:26) >  IMPRESSION:  Mild chronic microvascular changes without evidence of an   acute transcortical infarction or hemorrhage. MR is a more sensitive   imaging modality for the evaluation of an acute infarction. Findings   discussed with Dr. Nicole at 5:26 PM.      < end of copied text >      IMPRESSION:      PLAN:  1.   2.   3.   4.  5. CHIEF COMPLAINT: confusion    HPI: 79yMale  History of Present Illness: 	  78 y/o M, with PMH of HLD, COPD emphysema and dementia, brought in by EMS due to AMS as per ED note. Patient is a poor historian due to dementia which he admits to. He says taht know that is in the hospital but when asked questions he says :"I heard what you say but I can't respond, I just bable". He also says that "I am dehydrated". Tried to call spouse and daughter who are listed as contact on record but no response. Voicemail left. History mostly based on the by ED note:    Patient has reportedly had worsening AMS over the last 2 weeks, with acute worsening today. Patient was confused during breakfast and then went to take a nap at around 11 am. Family was unable to arouse the patient, EMS was called around 4:30-5pm. Apparently patient is verbal at baseline but didn't speak to EMS at the time of arrival, he was difficult to arouse. Patient was intermittently altered en route to  ED. Fingerstick 80s per EMS. Per family, patient has not had hx stroke, or any prior similar hx. No reported recent fevers or chills, nausea or vomiting, chest pain, shortness of breath, abdominal pain, diarrhea or constipation, dysuria. Family states patient has not been drinking very much over last 2 weeks, and has been sleeping more as compared to baseline.     PAST MEDICAL & SURGICAL HISTORY:  HLD (hyperlipidemia)  COPD (chronic obstructive pulmonary disease)  Dementia  No significant past surgical history    MEDICATIONS  (STANDING):  aspirin enteric coated 81 milliGRAM(s) Oral daily  fluticasone propionate  110 MICROgram(s) HFA Inhaler - Peds 2 Puff(s) Inhalation two times a day  galantamine 8 milliGRAM(s) Oral at bedtime  heparin  Injectable 5000 Unit(s) SubCutaneous every 12 hours  memantine 10 milliGRAM(s) Oral two times a day  multivitamin 1 Tablet(s) Oral daily  QUEtiapine 25 milliGRAM(s) Oral at bedtime    MEDICATIONS  (PRN):    Allergies    No Known Allergies    Intolerances        FAMILY HISTORY:          SOCIAL HISTORY:    Tobacco:  no  Alcohol:  no  Drugs:  no        REVIEW OF SYSTEMS:    Relevant systems are negative except as noted in the chart, HPI, and PMH      VITAL SIGNS:  Vital Signs Last 24 Hrs  T(C): 36.1 (21 Jun 2019 07:28), Max: 36.9 (20 Jun 2019 17:00)  T(F): 97 (21 Jun 2019 07:28), Max: 98.5 (21 Jun 2019 00:00)  HR: 72 (21 Jun 2019 07:28) (68 - 79)  BP: 154/83 (21 Jun 2019 07:28) (121/80 - 189/74)  BP(mean): --  RR: 16 (21 Jun 2019 07:28) (16 - 16)  SpO2: 95% (21 Jun 2019 07:28) (94% - 99%)    PHYSICAL EXAMINATION:    General: Well-developed, well nourished, in no acute distress.  Cardiac:  Regular rate and rhythm. No carotid bruits appreciated.  Eyes: Fundoscopic examination was deferred.  Neurologic:  - Mental Status:  Alert, awake, oriented to person, place, and time; Speech is fluent. Language is normal. Follows commands well.  Insight and knowledge appear appropriate.  Cranial Nerves II-XII:    II:  Visual acuity is normal for age ; Visual fields are full to confrontation; Pupils are equal, round, and reactive to light.  III, IV, VI:  Extraocular movements are intact without nystagmus.  V:  Facial sensation is intact in the V1-V3 distribution bilaterally.  VII:  Face is symmetric with normal eye closure and smile  VIII:  Hearing is grossly intact  IX, X, XII:  speech is clear  XI:  Head turning and shoulder shrug are intact.  - Motor:  Strength is 5/5 x 4.   There is no pronator drift. .  - Reflexes:  2+ and symmetric at the knees.  Plantar responses flexor.  - Sensory:  Symmetric to light touch  - Coordination:  Finger-nose-finger is normal. Rapid alternating hand and foot  movements are intact. Dexterity appears normal      LABS:                          11.9   8.5   )-----------( 163      ( 20 Jun 2019 17:40 )             37.6     21 Jun 2019 05:58    142    |  104    |  22.0   ----------------------------<  95     4.2     |  27.0   |  0.60     Ca    9.6        21 Jun 2019 05:58    TPro  6.2    /  Alb  3.9    /  TBili  0.4    /  DBili  x      /  AST  16     /  ALT  15     /  AlkPhos  100    20 Jun 2019 17:40    LIVER FUNCTIONS - ( 20 Jun 2019 17:40 )  Alb: 3.9 g/dL / Pro: 6.2 g/dL / ALK PHOS: 100 U/L / ALT: 15 U/L / AST: 16 U/L / GGT: x           PT/INR - ( 20 Jun 2019 17:40 )   PT: 12.3 sec;   INR: 1.07 ratio         PTT - ( 20 Jun 2019 17:40 )  PTT:37.3 sec      RADIOLOGY & ADDITIONAL STUDIES:    < from: CT Brain Stroke Protocol (06.20.19 @ 17:26) >  IMPRESSION:  Mild chronic microvascular changes without evidence of an   acute transcortical infarction or hemorrhage. MR is a more sensitive   imaging modality for the evaluation of an acute infarction. Findings   discussed with Dr. Nicole at 5:26 PM.      < end of copied text >      IMPRESSION:      PLAN:  1.   2.   3.   4.  5. CHIEF COMPLAINT: confusion    HPI: 79yMale  History of Present Illness: 	  80 y/o M, with PMH of HLD, COPD emphysema and dementia, brought in by EMS due to AMS as per ED note. Patient is a poor historian due to dementia which he admits to. He says taht know that is in the hospital but when asked questions he says :"I heard what you say but I can't respond, I just bable". He also says that "I am dehydrated". Tried to call spouse and daughter who are listed as contact on record but no response. Voicemail left. History mostly based on the by ED note:    Patient has reportedly had worsening AMS over the last 2 weeks, with acute worsening today. Patient was confused during breakfast and then went to take a nap at around 11 am. Family was unable to arouse the patient, EMS was called around 4:30-5pm. Apparently patient is verbal at baseline but didn't speak to EMS at the time of arrival, he was difficult to arouse. Patient was intermittently altered en route to  ED. Fingerstick 80s per EMS. Per family, patient has not had hx stroke, or any prior similar hx. No reported recent fevers or chills, nausea or vomiting, chest pain, shortness of breath, abdominal pain, diarrhea or constipation, dysuria. Family states patient has not been drinking very much over last 2 weeks, and has been sleeping more as compared to baseline.     This morning he is at his baseline and no complaints. Daughter in attendence    PAST MEDICAL & SURGICAL HISTORY:  HLD (hyperlipidemia)  COPD (chronic obstructive pulmonary disease)  Dementia  No significant past surgical history    MEDICATIONS  (STANDING):  aspirin enteric coated 81 milliGRAM(s) Oral daily  fluticasone propionate  110 MICROgram(s) HFA Inhaler - Peds 2 Puff(s) Inhalation two times a day  galantamine 8 milliGRAM(s) Oral at bedtime  heparin  Injectable 5000 Unit(s) SubCutaneous every 12 hours  memantine 10 milliGRAM(s) Oral two times a day  multivitamin 1 Tablet(s) Oral daily  QUEtiapine 25 milliGRAM(s) Oral at bedtime    MEDICATIONS  (PRN):    Allergies    No Known Allergies    Intolerances        FAMILY HISTORY:          SOCIAL HISTORY:  Lives at home with wife and 2 sons  Tobacco:  no  Alcohol:  no  Drugs:  no        REVIEW OF SYSTEMS:    Relevant systems are negative except as noted in the chart, HPI, and PMH      VITAL SIGNS:  Vital Signs Last 24 Hrs  T(C): 36.1 (21 Jun 2019 07:28), Max: 36.9 (20 Jun 2019 17:00)  T(F): 97 (21 Jun 2019 07:28), Max: 98.5 (21 Jun 2019 00:00)  HR: 72 (21 Jun 2019 07:28) (68 - 79)  BP: 154/83 (21 Jun 2019 07:28) (121/80 - 189/74)  BP(mean): --  RR: 16 (21 Jun 2019 07:28) (16 - 16)  SpO2: 95% (21 Jun 2019 07:28) (94% - 99%)    PHYSICAL EXAMINATION:    General: Well-developed, well nourished, in no acute distress.  Cardiac:  Regular rate and rhythm. No carotid bruits appreciated.  Eyes: Fundoscopic examination was deferred.  Neurologic:  - Mental Status:  Alert, awake, oriented to person, place, and time; Speech is fluent. Language is normal. Follows commands well.  Insight and knowledge appear somewhat diminshed  Cranial Nerves II-XII:    II:  Visual acuity is normal for age ; Visual fields are full to confrontation; Pupils are equal, round, and reactive to light.  III, IV, VI:  Extraocular movements are intact without nystagmus.  V:  Facial sensation is intact in the V1-V3 distribution bilaterally.  VII:  Face is symmetric with normal eye closure and smile  VIII:  Hearing is grossly intact  IX, X, XII:  speech is clear  XI:  Head turning and shoulder shrug are intact.  - Motor:  Strength is 5/5 x 4.   There is no pronator drift. .  - Reflexes:  2+ and symmetric at the knees.  Plantar responses flexor.  - Sensory:  Symmetric to light touch  - Coordination:  Finger-nose-finger is normal. Rapid alternating hand and foot  movements are intact. Dexterity appears normal      LABS:                          11.9   8.5   )-----------( 163      ( 20 Jun 2019 17:40 )             37.6     21 Jun 2019 05:58    142    |  104    |  22.0   ----------------------------<  95     4.2     |  27.0   |  0.60     Ca    9.6        21 Jun 2019 05:58    TPro  6.2    /  Alb  3.9    /  TBili  0.4    /  DBili  x      /  AST  16     /  ALT  15     /  AlkPhos  100    20 Jun 2019 17:40    LIVER FUNCTIONS - ( 20 Jun 2019 17:40 )  Alb: 3.9 g/dL / Pro: 6.2 g/dL / ALK PHOS: 100 U/L / ALT: 15 U/L / AST: 16 U/L / GGT: x           PT/INR - ( 20 Jun 2019 17:40 )   PT: 12.3 sec;   INR: 1.07 ratio         PTT - ( 20 Jun 2019 17:40 )  PTT:37.3 sec      RADIOLOGY & ADDITIONAL STUDIES:    < from: CT Brain Stroke Protocol (06.20.19 @ 17:26) >  IMPRESSION:  Mild chronic microvascular changes without evidence of an   acute transcortical infarction or hemorrhage. MR is a more sensitive   imaging modality for the evaluation of an acute infarction. Findings   discussed with Dr. Nicole at 5:26 PM.      < end of copied text >      IMPRESSION:    h/o Alzheimer's. Followed in our office.   Episode of decreased responsiveness no focal deficit . No report of seizure    PLAN:  1. Continue current dementia meds  2. MRI ordered by hospitalist  3 Check B12, TSH  4. outpatient EEG if symptoms recur  5 Medical and Cardiac evaluation and treatment as indicated

## 2019-06-21 NOTE — PROGRESS NOTE ADULT - SUBJECTIVE AND OBJECTIVE BOX
Patient is a 79y old  Male who presents with a chief complaint of Altered mental status (2019 23:17)    INTERVAL HPI/OVERNIGHT EVENTS:  Patient seen and examined at bedside. No acute events overnight. Patient reports gis poor historian, toleratin pureed diet, on IVF due to dehydration    ROS: unable to obtain due to patient's baseline dementia    T(C): 36.8 (19 @ 02:50), Max: 36.9 (19 @ 17:00)  HR: 73 (19 @ 02:50) (68 - 79)  BP: 189/74 (19 @ 02:50) (121/80 - 189/74)  RR: 16 (19 @ 02:50) (16 - 16)  SpO2: 99% (19 @ 02:50) (94% - 99%)  Wt(kg): --  I&O's Summary    Telemonitor: NSR 70's Occasional PVCs    Daily Height in cm: 180.34 (2019 17:00)    Daily   BMI (kg/m2): 25.1 ( @ 17:00)    I&O's Summary        PHYSICAL EXAM:  	General: Well nourished/Well developed elderly male, NAD  	Head:  Normocephalic, atraumatic  	EENT:  PERRLA, EOMI, Mucosa dry, no ulcerations  	Neck:  Supple, no sinuses or palpable masses  	Lymphatic:  No palpable cervical, supraclavicular, axillary or inguinal adenopathy  	Respiratory: Basilar right sided inspiratory crackles. No wheezing   	CV: RRR, S1S2, no murmur  	GI: Abdomen soft, NT, ND no palpable mass  	Extremities: 2+ pitting edema, + peripheral pulses, FROM all 4 extremity  Neurology: A&Ox3, no focal signs. Naming and repetition impaired. Comprehensive aphasia. Memory impaired    LABS:                        11.9   8.5   )-----------( 163      ( 2019 17:40 )             37.6         143  |  104  |  28.0<H>  ----------------------------<  94  5.0   |  30.0<H>  |  0.96    Ca    10.4<H>      2019 17:40    TPro  6.2<L>  /  Alb  3.9  /  TBili  0.4  /  DBili  x   /  AST  16  /  ALT  15  /  AlkPhos  100  06-20    PT/INR - ( 2019 17:40 )   PT: 12.3 sec;   INR: 1.07 ratio         PTT - ( 2019 17:40 )  PTT:37.3 sec  Urinalysis Basic - ( 2019 21:19 )    Color: Yellow / Appearance: Clear / S.015 / pH: x  Gluc: x / Ketone: Negative  / Bili: Negative / Urobili: Negative mg/dL   Blood: x / Protein: Negative mg/dL / Nitrite: Negative   Leuk Esterase: Negative / RBC: x / WBC x   Sq Epi: x / Non Sq Epi: x / Bacteria: x      CAPILLARY BLOOD GLUCOSE      POCT Blood Glucose.: 88 mg/dL (2019 23:46)        Urinalysis Basic - ( 2019 21:19 )    Color: Yellow / Appearance: Clear / S.015 / pH: x  Gluc: x / Ketone: Negative  / Bili: Negative / Urobili: Negative mg/dL   Blood: x / Protein: Negative mg/dL / Nitrite: Negative   Leuk Esterase: Negative / RBC: x / WBC x   Sq Epi: x / Non Sq Epi: x / Bacteria: x    < from: CT Brain Stroke Protocol (19 @ 17:26) >    IMPRESSION:  Mild chronic microvascular changes without evidence of an   acute transcortical infarction or hemorrhage. MR is a more sensitive   imaging modality for the evaluation of an acute infarction. Findings   discussed with Dr. Nicole at 5:26 PM.    < end of copied text >        RADIOLOGY & ADDITIONAL TESTS:      MEDICATIONS  (STANDING):  aspirin enteric coated 81 milliGRAM(s) Oral daily  fluticasone propionate  110 MICROgram(s) HFA Inhaler - Peds 2 Puff(s) Inhalation two times a day  galantamine 8 milliGRAM(s) Oral at bedtime  heparin  Injectable 5000 Unit(s) SubCutaneous every 12 hours  memantine 10 milliGRAM(s) Oral two times a day  multivitamin 1 Tablet(s) Oral daily  QUEtiapine 25 milliGRAM(s) Oral at bedtime    MEDICATIONS  (PRN):

## 2019-06-21 NOTE — DISCHARGE NOTE PROVIDER - PROVIDER TOKENS
PROVIDER:[TOKEN:[[id number]],FOLLOWUP:[2 weeks]],PROVIDER:[TOKEN:[[id number]],FOLLOWUP:[2 weeks]],FREE:[LAST:[Dr. Pruett],PHONE:[(   )    -],FAX:[(   )    -],ADDRESS:[[facility]],FOLLOWUP:[1 week]]

## 2019-06-21 NOTE — DISCHARGE NOTE PROVIDER - CARE PROVIDERS DIRECT ADDRESSES
[email or web address],DirectAddress_Unknown,DirectAddress_Unknown

## 2019-06-22 LAB
CULTURE RESULTS: NO GROWTH — SIGNIFICANT CHANGE UP
SPECIMEN SOURCE: SIGNIFICANT CHANGE UP

## 2019-06-24 ENCOUNTER — INBOUND DOCUMENT (OUTPATIENT)
Age: 79
End: 2019-06-24

## 2019-07-03 PROBLEM — F03.90 UNSPECIFIED DEMENTIA WITHOUT BEHAVIORAL DISTURBANCE: Chronic | Status: ACTIVE | Noted: 2019-06-20

## 2019-07-03 PROBLEM — J44.9 CHRONIC OBSTRUCTIVE PULMONARY DISEASE, UNSPECIFIED: Chronic | Status: ACTIVE | Noted: 2019-06-20

## 2019-07-03 PROBLEM — E78.5 HYPERLIPIDEMIA, UNSPECIFIED: Chronic | Status: ACTIVE | Noted: 2019-06-21

## 2019-08-05 ENCOUNTER — APPOINTMENT (OUTPATIENT)
Dept: PULMONOLOGY | Facility: CLINIC | Age: 79
End: 2019-08-05
Payer: MEDICARE

## 2019-08-05 VITALS
HEART RATE: 80 BPM | BODY MASS INDEX: 21.76 KG/M2 | WEIGHT: 152 LBS | OXYGEN SATURATION: 92 % | SYSTOLIC BLOOD PRESSURE: 130 MMHG | DIASTOLIC BLOOD PRESSURE: 70 MMHG | HEIGHT: 70 IN

## 2019-08-05 DIAGNOSIS — J44.9 CHRONIC OBSTRUCTIVE PULMONARY DISEASE, UNSPECIFIED: ICD-10-CM

## 2019-08-05 DIAGNOSIS — J47.9 BRONCHIECTASIS, UNCOMPLICATED: ICD-10-CM

## 2019-08-05 DIAGNOSIS — Z09 ENCOUNTER FOR FOLLOW-UP EXAMINATION AFTER COMPLETED TREATMENT FOR CONDITIONS OTHER THAN MALIGNANT NEOPLASM: ICD-10-CM

## 2019-08-05 DIAGNOSIS — Z87.891 PERSONAL HISTORY OF NICOTINE DEPENDENCE: ICD-10-CM

## 2019-08-05 DIAGNOSIS — R06.02 SHORTNESS OF BREATH: ICD-10-CM

## 2019-08-05 DIAGNOSIS — J44.1 CHRONIC OBSTRUCTIVE PULMONARY DISEASE WITH (ACUTE) EXACERBATION: ICD-10-CM

## 2019-08-05 DIAGNOSIS — F03.90 UNSPECIFIED DEMENTIA W/OUT BEHAVIORAL DISTURBANCE: ICD-10-CM

## 2019-08-05 PROCEDURE — 99214 OFFICE O/P EST MOD 30 MIN: CPT

## 2019-08-05 NOTE — REVIEW OF SYSTEMS
[Fatigue] : fatigue [Dry Eyes] : dryness of the eyes [As Noted in HPI] : as noted in HPI [Dyspnea] : dyspnea [Memory Loss] : ~T memory lapses or loss [Anxiety] : anxiety [Negative] : Pulmonary Hypertension

## 2019-08-05 NOTE — REASON FOR VISIT
[Follow-Up - From Hospitalization] : a hospitalization follow-up [Shortness of Breath] : shortness of Breath [COPD] : COPD [Spouse] : spouse

## 2019-08-05 NOTE — PHYSICAL EXAM
[Normal Conjunctiva] : the conjunctiva exhibited no abnormalities [General Appearance - Well Developed] : well developed [Neck Appearance] : the appearance of the neck was normal [Normal Oropharynx] : normal oropharynx [Heart Sounds] : normal S1 and S2 [Heart Rate And Rhythm] : heart rate and rhythm were normal [Murmurs] : no murmurs present [Arterial Pulses Normal] : the arterial pulses were normal [] : no respiratory distress [Respiration, Rhythm And Depth] : normal respiratory rhythm and effort [Edema] : no peripheral edema present [Exaggerated Use Of Accessory Muscles For Inspiration] : no accessory muscle use [Auscultation Breath Sounds / Voice Sounds] : lungs were clear to auscultation bilaterally [Decreased Breath Sounds] : decreased breath sounds [Increased E/I Ratio] : with increased expiratory/inspiratory ratio [Abdomen Soft] : soft [Ataxic, Wide-Based] : wide-based and ataxic [Nail Clubbing] : no clubbing of the fingernails [Cyanosis, Localized] : no localized cyanosis [Skin Turgor] : normal skin turgor [Oriented To Time, Place, And Person] : oriented to person, place, and time [Impaired Insight] : insight and judgment were intact [Affect] : the affect was normal

## 2019-09-14 ENCOUNTER — INPATIENT (INPATIENT)
Facility: HOSPITAL | Age: 79
LOS: 1 days | Discharge: HOME HEALTH | DRG: 948 | End: 2019-09-16
Attending: HOSPITALIST | Admitting: HOSPITALIST
Payer: MEDICARE

## 2019-09-14 VITALS
SYSTOLIC BLOOD PRESSURE: 197 MMHG | HEART RATE: 60 BPM | OXYGEN SATURATION: 99 % | RESPIRATION RATE: 18 BRPM | DIASTOLIC BLOOD PRESSURE: 101 MMHG | TEMPERATURE: 92 F

## 2019-09-14 DIAGNOSIS — T68.XXXA HYPOTHERMIA, INITIAL ENCOUNTER: ICD-10-CM

## 2019-09-14 LAB
ALBUMIN SERPL ELPH-MCNC: 3.8 G/DL — SIGNIFICANT CHANGE UP (ref 3.3–5.2)
ALP SERPL-CCNC: 90 U/L — SIGNIFICANT CHANGE UP (ref 40–120)
ALT FLD-CCNC: 34 U/L — SIGNIFICANT CHANGE UP
AMPHET UR-MCNC: NEGATIVE — SIGNIFICANT CHANGE UP
ANION GAP SERPL CALC-SCNC: 7 MMOL/L — SIGNIFICANT CHANGE UP (ref 5–17)
APPEARANCE UR: CLEAR — SIGNIFICANT CHANGE UP
AST SERPL-CCNC: 33 U/L — SIGNIFICANT CHANGE UP
BARBITURATES UR SCN-MCNC: NEGATIVE — SIGNIFICANT CHANGE UP
BASOPHILS # BLD AUTO: 0.02 K/UL — SIGNIFICANT CHANGE UP (ref 0–0.2)
BASOPHILS NFR BLD AUTO: 0.3 % — SIGNIFICANT CHANGE UP (ref 0–2)
BENZODIAZ UR-MCNC: NEGATIVE — SIGNIFICANT CHANGE UP
BILIRUB SERPL-MCNC: 0.4 MG/DL — SIGNIFICANT CHANGE UP (ref 0.4–2)
BILIRUB UR-MCNC: NEGATIVE — SIGNIFICANT CHANGE UP
BLD GP AB SCN SERPL QL: SIGNIFICANT CHANGE UP
BUN SERPL-MCNC: 22 MG/DL — HIGH (ref 8–20)
CALCIUM SERPL-MCNC: 9.9 MG/DL — SIGNIFICANT CHANGE UP (ref 8.6–10.2)
CHLORIDE SERPL-SCNC: 104 MMOL/L — SIGNIFICANT CHANGE UP (ref 98–107)
CO2 SERPL-SCNC: 30 MMOL/L — HIGH (ref 22–29)
COCAINE METAB.OTHER UR-MCNC: NEGATIVE — SIGNIFICANT CHANGE UP
COLOR SPEC: YELLOW — SIGNIFICANT CHANGE UP
CREAT SERPL-MCNC: 0.57 MG/DL — SIGNIFICANT CHANGE UP (ref 0.5–1.3)
DIFF PNL FLD: NEGATIVE — SIGNIFICANT CHANGE UP
EOSINOPHIL # BLD AUTO: 0.17 K/UL — SIGNIFICANT CHANGE UP (ref 0–0.5)
EOSINOPHIL NFR BLD AUTO: 3 % — SIGNIFICANT CHANGE UP (ref 0–6)
GLUCOSE SERPL-MCNC: 85 MG/DL — SIGNIFICANT CHANGE UP (ref 70–115)
GLUCOSE UR QL: NEGATIVE MG/DL — SIGNIFICANT CHANGE UP
HCT VFR BLD CALC: 37.1 % — LOW (ref 39–50)
HGB BLD-MCNC: 11.8 G/DL — LOW (ref 13–17)
IMM GRANULOCYTES NFR BLD AUTO: 0.3 % — SIGNIFICANT CHANGE UP (ref 0–1.5)
KETONES UR-MCNC: NEGATIVE — SIGNIFICANT CHANGE UP
LEUKOCYTE ESTERASE UR-ACNC: NEGATIVE — SIGNIFICANT CHANGE UP
LIDOCAIN IGE QN: 17 U/L — LOW (ref 22–51)
LYMPHOCYTES # BLD AUTO: 1.03 K/UL — SIGNIFICANT CHANGE UP (ref 1–3.3)
LYMPHOCYTES # BLD AUTO: 17.9 % — SIGNIFICANT CHANGE UP (ref 13–44)
MCHC RBC-ENTMCNC: 30 PG — SIGNIFICANT CHANGE UP (ref 27–34)
MCHC RBC-ENTMCNC: 31.8 GM/DL — LOW (ref 32–36)
MCV RBC AUTO: 94.4 FL — SIGNIFICANT CHANGE UP (ref 80–100)
METHADONE UR-MCNC: NEGATIVE — SIGNIFICANT CHANGE UP
MONOCYTES # BLD AUTO: 0.44 K/UL — SIGNIFICANT CHANGE UP (ref 0–0.9)
MONOCYTES NFR BLD AUTO: 7.7 % — SIGNIFICANT CHANGE UP (ref 2–14)
NEUTROPHILS # BLD AUTO: 4.07 K/UL — SIGNIFICANT CHANGE UP (ref 1.8–7.4)
NEUTROPHILS NFR BLD AUTO: 70.8 % — SIGNIFICANT CHANGE UP (ref 43–77)
NITRITE UR-MCNC: NEGATIVE — SIGNIFICANT CHANGE UP
OPIATES UR-MCNC: NEGATIVE — SIGNIFICANT CHANGE UP
PCP SPEC-MCNC: SIGNIFICANT CHANGE UP
PCP UR-MCNC: NEGATIVE — SIGNIFICANT CHANGE UP
PH UR: 7 — SIGNIFICANT CHANGE UP (ref 5–8)
PLATELET # BLD AUTO: 142 K/UL — LOW (ref 150–400)
POTASSIUM SERPL-MCNC: 4.9 MMOL/L — SIGNIFICANT CHANGE UP (ref 3.5–5.3)
POTASSIUM SERPL-SCNC: 4.9 MMOL/L — SIGNIFICANT CHANGE UP (ref 3.5–5.3)
PROT SERPL-MCNC: 6.2 G/DL — LOW (ref 6.6–8.7)
PROT UR-MCNC: NEGATIVE MG/DL — SIGNIFICANT CHANGE UP
RBC # BLD: 3.93 M/UL — LOW (ref 4.2–5.8)
RBC # FLD: 15.7 % — HIGH (ref 10.3–14.5)
SODIUM SERPL-SCNC: 141 MMOL/L — SIGNIFICANT CHANGE UP (ref 135–145)
SP GR SPEC: 1.01 — SIGNIFICANT CHANGE UP (ref 1.01–1.02)
THC UR QL: NEGATIVE — SIGNIFICANT CHANGE UP
TROPONIN T SERPL-MCNC: <0.01 NG/ML — SIGNIFICANT CHANGE UP (ref 0–0.06)
UROBILINOGEN FLD QL: NEGATIVE MG/DL — SIGNIFICANT CHANGE UP
WBC # BLD: 5.75 K/UL — SIGNIFICANT CHANGE UP (ref 3.8–10.5)
WBC # FLD AUTO: 5.75 K/UL — SIGNIFICANT CHANGE UP (ref 3.8–10.5)

## 2019-09-14 PROCEDURE — 99291 CRITICAL CARE FIRST HOUR: CPT

## 2019-09-14 PROCEDURE — 70450 CT HEAD/BRAIN W/O DYE: CPT | Mod: 26

## 2019-09-14 PROCEDURE — 71045 X-RAY EXAM CHEST 1 VIEW: CPT | Mod: 26

## 2019-09-14 PROCEDURE — 99223 1ST HOSP IP/OBS HIGH 75: CPT | Mod: AI

## 2019-09-14 RX ORDER — CEFTRIAXONE 500 MG/1
1000 INJECTION, POWDER, FOR SOLUTION INTRAMUSCULAR; INTRAVENOUS ONCE
Refills: 0 | Status: DISCONTINUED | OUTPATIENT
Start: 2019-09-14 | End: 2019-09-14

## 2019-09-14 RX ORDER — GALANTAMINE HYDROBROMIDE 4 MG/1
1 TABLET, FILM COATED ORAL
Qty: 0 | Refills: 0 | DISCHARGE

## 2019-09-14 RX ORDER — MEMANTINE HYDROCHLORIDE 10 MG/1
1 TABLET ORAL
Qty: 0 | Refills: 0 | DISCHARGE

## 2019-09-14 RX ORDER — ALBUTEROL 90 UG/1
1 AEROSOL, METERED ORAL EVERY 4 HOURS
Refills: 0 | Status: DISCONTINUED | OUTPATIENT
Start: 2019-09-14 | End: 2019-09-16

## 2019-09-14 RX ORDER — ASPIRIN/CALCIUM CARB/MAGNESIUM 324 MG
1 TABLET ORAL
Qty: 0 | Refills: 0 | DISCHARGE

## 2019-09-14 RX ORDER — ALBUTEROL 90 UG/1
2.5 AEROSOL, METERED ORAL EVERY 6 HOURS
Refills: 0 | Status: DISCONTINUED | OUTPATIENT
Start: 2019-09-14 | End: 2019-09-16

## 2019-09-14 RX ORDER — GALANTAMINE HYDROBROMIDE 4 MG/1
8 TABLET, FILM COATED ORAL
Refills: 0 | Status: COMPLETED | OUTPATIENT
Start: 2019-09-14 | End: 2019-09-14

## 2019-09-14 RX ORDER — IPRATROPIUM/ALBUTEROL SULFATE 18-103MCG
3 AEROSOL WITH ADAPTER (GRAM) INHALATION EVERY 6 HOURS
Refills: 0 | Status: DISCONTINUED | OUTPATIENT
Start: 2019-09-14 | End: 2019-09-16

## 2019-09-14 RX ORDER — CYCLOSPORINE 0.5 MG/ML
1 EMULSION OPHTHALMIC
Qty: 0 | Refills: 0 | DISCHARGE

## 2019-09-14 RX ORDER — TIOTROPIUM BROMIDE 18 UG/1
1 CAPSULE ORAL; RESPIRATORY (INHALATION) DAILY
Refills: 0 | Status: DISCONTINUED | OUTPATIENT
Start: 2019-09-14 | End: 2019-09-14

## 2019-09-14 RX ORDER — FLUTICASONE PROPIONATE 220 MCG
1 AEROSOL WITH ADAPTER (GRAM) INHALATION
Qty: 0 | Refills: 0 | DISCHARGE

## 2019-09-14 RX ORDER — SODIUM CHLORIDE 9 MG/ML
1000 INJECTION INTRAMUSCULAR; INTRAVENOUS; SUBCUTANEOUS ONCE
Refills: 0 | Status: COMPLETED | OUTPATIENT
Start: 2019-09-14 | End: 2019-09-14

## 2019-09-14 RX ORDER — BUDESONIDE, MICRONIZED 100 %
1 POWDER (GRAM) MISCELLANEOUS
Qty: 0 | Refills: 0 | DISCHARGE

## 2019-09-14 RX ORDER — MEMANTINE HYDROCHLORIDE 10 MG/1
5 TABLET ORAL
Refills: 0 | Status: DISCONTINUED | OUTPATIENT
Start: 2019-09-14 | End: 2019-09-16

## 2019-09-14 RX ADMIN — GALANTAMINE HYDROBROMIDE 8 MILLIGRAM(S): 4 TABLET, FILM COATED ORAL at 22:22

## 2019-09-14 RX ADMIN — SODIUM CHLORIDE 1000 MILLILITER(S): 9 INJECTION INTRAMUSCULAR; INTRAVENOUS; SUBCUTANEOUS at 13:17

## 2019-09-14 NOTE — ED ADULT TRIAGE NOTE - CHIEF COMPLAINT QUOTE
unresponsive from home; last seen normal yesterday by family. unresponsive to pain. direct to critical room.

## 2019-09-14 NOTE — H&P ADULT - NSICDXPASTMEDICALHX_GEN_ALL_CORE_FT
PAST MEDICAL HISTORY:  COPD (chronic obstructive pulmonary disease)     Dementia     HLD (hyperlipidemia)

## 2019-09-14 NOTE — H&P ADULT - NSHPSOCIALHISTORY_GEN_ALL_CORE
Prior tobacco use  Denied alcohol or illicit drug use    Lives at home with his wife    Family History: Parents without history premature coronary artery disease

## 2019-09-14 NOTE — ED ADULT NURSE REASSESSMENT NOTE - NS ED NURSE REASSESS COMMENT FT1
MD resident Brennen at bedside evaluating pt , pt awake and alert following commands, resp even and unlabored no distress noted

## 2019-09-14 NOTE — ED PROVIDER NOTE - CRITICAL CARE PROVIDED
documentation/direct patient care (not related to procedure)/consultation with other physicians/additional history taking/interpretation of diagnostic studies

## 2019-09-14 NOTE — ED PROVIDER NOTE - CARE PLAN
Principal Discharge DX:	Hypothermia  Secondary Diagnosis:	COPD (chronic obstructive pulmonary disease)  Secondary Diagnosis:	Dementia  Secondary Diagnosis:	HLD (hyperlipidemia)

## 2019-09-14 NOTE — ED PROVIDER NOTE - CLINICAL SUMMARY MEDICAL DECISION MAKING FREE TEXT BOX
The patient presents with depressed mental status with severe hypothermia and will admit for further evaluation

## 2019-09-14 NOTE — ED ADULT NURSE REASSESSMENT NOTE - NS ED NURSE REASSESS COMMENT FT1
pt temp 92.2 MD Romero made aware pt provided with multiple warm blankets, warms fluids given  and socks placed on pt pt temp 92.2 MD Romero made aware pt provided with multiple warm blankets, warms fluids given and socks placed on pt, pt taken to CT ... will continue to monitor

## 2019-09-14 NOTE — H&P ADULT - HISTORY OF PRESENT ILLNESS
79M presented from home after his wife found him unresponsive this morning. The patient is unable to provide significant history and information was obtained from his wife at the bedside. The patient was noted to be in his usual state of health yesterday but she did mention what appeared to be hallucinations from the patient. She reported that he has had similar symptoms in the past which was attributed to worsening dementia. He also complained of an episode of abdominal pain which was relieved after a bowel movement. On the morning of presentation, the patient was found to be lethargic and poorly responsive to stimuli. Upon presentation to the emergency department, the patient was noted to be hypothermic. His mentation improved with improvement in his temperature. The patient's wife noted a history of dementia for the past three years but denied any prior episodes of hypothermia. Review of his prior admission in June noted encephalopathy due to dehydration and progressing dementia. She is unaware of any aggravating or relieving factors. There were no reported fevers, chills, or sick contacts.

## 2019-09-14 NOTE — ED PROVIDER NOTE - OBJECTIVE STATEMENT
The patient is a 79 year old male presents with unresponsiveness  No fever, No chill  No overdose  No trauma  The patient had diarrhea  LKW 2 days ago

## 2019-09-14 NOTE — H&P ADULT - NSHPPHYSICALEXAM_GEN_ALL_CORE
Vital Signs Last 24 Hrs  T(C): 34.8 (14 Sep 2019 16:47), Max: 35.3 (14 Sep 2019 14:53)  T(F): 94.7 (14 Sep 2019 16:47), Max: 95.5 (14 Sep 2019 14:53)  HR: 56 (14 Sep 2019 14:53) (55 - 60)  BP: 151/70 (14 Sep 2019 14:53) (151/70 - 197/101)  BP(mean): --  RR: 18 (14 Sep 2019 14:53) (14 - 18)  SpO2: 97% (14 Sep 2019 14:53) (97% - 99%)    General appearance: No acute distress, Awake, Alert  HEENT: Normocephalic, Atraumatic, Conjunctiva clear, EOMI  Neck: Supple, No JVD, No tenderness  Lungs: Breath sound equal bilaterally, No wheezes, No rales  Cardiovascular: S1S2, Regular rhythm  Abdomen: Soft, Nontender, Nondistended, No guarding/rebound, Positive bowel sounds  Extremities: No clubbing, No cyanosis, No edema, No calf tenderness  Neuro: Strength equal bilaterally, No tremors  Psychiatric: Appropriate mood, Normal affect

## 2019-09-14 NOTE — H&P ADULT - ASSESSMENT
79M with 79M with a history of dementia and COPD who presented with unresponsiveness found to be hypothermic    Hypothermia - Unclear etiology. Improved with warming blanket. Thyroid panel and cortisol level pending. No obvious signs of infection. Urinalysis was without findings indicative of infection. Blood cultures to be collected. To continue to monitor closely.    Dementia - On memantine. Quetiapine to be held as a possible contributing factor to hypothermia.    COPD - Inhaled bronchodilator therapy as needed. On tiotropium.    Discussed with the patient and his wife at the bedside.

## 2019-09-14 NOTE — ED ADULT NURSE REASSESSMENT NOTE - NS ED NURSE REASSESS COMMENT FT1
pt lethargic but talking with family at bedside pt A&Ox2- oriented to self and place, resp even and unlabored no distress, pt denies any pain and appears comfortable, in stretcher, cardiac monitor in place, will continue to monitor

## 2019-09-15 LAB
ALBUMIN SERPL ELPH-MCNC: 3.5 G/DL — SIGNIFICANT CHANGE UP (ref 3.3–5.2)
ALP SERPL-CCNC: 86 U/L — SIGNIFICANT CHANGE UP (ref 40–120)
ALT FLD-CCNC: 28 U/L — SIGNIFICANT CHANGE UP
ANION GAP SERPL CALC-SCNC: 9 MMOL/L — SIGNIFICANT CHANGE UP (ref 5–17)
AST SERPL-CCNC: 23 U/L — SIGNIFICANT CHANGE UP
BILIRUB SERPL-MCNC: 0.5 MG/DL — SIGNIFICANT CHANGE UP (ref 0.4–2)
BUN SERPL-MCNC: 18 MG/DL — SIGNIFICANT CHANGE UP (ref 8–20)
CALCIUM SERPL-MCNC: 9.6 MG/DL — SIGNIFICANT CHANGE UP (ref 8.6–10.2)
CHLORIDE SERPL-SCNC: 105 MMOL/L — SIGNIFICANT CHANGE UP (ref 98–107)
CO2 SERPL-SCNC: 28 MMOL/L — SIGNIFICANT CHANGE UP (ref 22–29)
CREAT SERPL-MCNC: 0.64 MG/DL — SIGNIFICANT CHANGE UP (ref 0.5–1.3)
GLUCOSE SERPL-MCNC: 63 MG/DL — LOW (ref 70–115)
HCT VFR BLD CALC: 35.6 % — LOW (ref 39–50)
HGB BLD-MCNC: 11.3 G/DL — LOW (ref 13–17)
MCHC RBC-ENTMCNC: 30 PG — SIGNIFICANT CHANGE UP (ref 27–34)
MCHC RBC-ENTMCNC: 31.7 GM/DL — LOW (ref 32–36)
MCV RBC AUTO: 94.4 FL — SIGNIFICANT CHANGE UP (ref 80–100)
PLATELET # BLD AUTO: 123 K/UL — LOW (ref 150–400)
POTASSIUM SERPL-MCNC: 4.4 MMOL/L — SIGNIFICANT CHANGE UP (ref 3.5–5.3)
POTASSIUM SERPL-SCNC: 4.4 MMOL/L — SIGNIFICANT CHANGE UP (ref 3.5–5.3)
PROT SERPL-MCNC: 5.6 G/DL — LOW (ref 6.6–8.7)
RBC # BLD: 3.77 M/UL — LOW (ref 4.2–5.8)
RBC # FLD: 15.8 % — HIGH (ref 10.3–14.5)
SODIUM SERPL-SCNC: 142 MMOL/L — SIGNIFICANT CHANGE UP (ref 135–145)
T3 SERPL-MCNC: 91 NG/DL — SIGNIFICANT CHANGE UP (ref 80–200)
T4 AB SER-ACNC: 6.8 UG/DL — SIGNIFICANT CHANGE UP (ref 4.5–12)
TSH SERPL-MCNC: 2.31 UIU/ML — SIGNIFICANT CHANGE UP (ref 0.27–4.2)
WBC # BLD: 5.56 K/UL — SIGNIFICANT CHANGE UP (ref 3.8–10.5)
WBC # FLD AUTO: 5.56 K/UL — SIGNIFICANT CHANGE UP (ref 3.8–10.5)

## 2019-09-15 PROCEDURE — 99232 SBSQ HOSP IP/OBS MODERATE 35: CPT

## 2019-09-15 RX ADMIN — MEMANTINE HYDROCHLORIDE 5 MILLIGRAM(S): 10 TABLET ORAL at 18:18

## 2019-09-15 RX ADMIN — MEMANTINE HYDROCHLORIDE 5 MILLIGRAM(S): 10 TABLET ORAL at 05:01

## 2019-09-15 NOTE — PROGRESS NOTE ADULT - SUBJECTIVE AND OBJECTIVE BOX
KATH KENDRICKTOMAS  ----------------------------------------  The patient was seen and evaluated for hypothermia.  The patient is in no acute distress.  Denied any chest pain, palpitations, dyspnea, or abdominal pain.  Awake and alert.    Vital Signs Last 24 Hrs  T(C): 36.3 (15 Sep 2019 11:48), Max: 36.6 (14 Sep 2019 18:27)  T(F): 97.3 (15 Sep 2019 11:48), Max: 97.9 (14 Sep 2019 18:27)  HR: 72 (15 Sep 2019 11:48) (55 - 73)  BP: 138/77 (15 Sep 2019 11:48) (138/77 - 197/101)  BP(mean): --  RR: 18 (15 Sep 2019 11:48) (14 - 18)  SpO2: 95% (15 Sep 2019 11:48) (93% - 99%)    PHYSICAL EXAMINATION:  ----------------------------------------  General appearance: No acute distress, Awake, Alert  HEENT: Normocephalic, Atraumatic, Conjunctiva clear, EOMI  Neck: Supple, No JVD, No tenderness  Lungs: Breath sound equal bilaterally, No wheezes, No rales  Cardiovascular: S1S2, Regular rhythm  Abdomen: Soft, Nontender, Nondistended, No guarding/rebound, Positive bowel sounds  Extremities: No clubbing, No cyanosis, No edema, No calf tenderness  Neuro: Strength equal bilaterally, No tremors  Psychiatric: Appropriate mood, Normal affect    LABORATORY STUDIES:  ----------------------------------------             11.3   5.56  )-----------( 123      ( 15 Sep 2019 07:08 )             35.6     09-15    142  |  105  |  18.0  ----------------------------<  63<L>  4.4   |  28.0  |  0.64    Ca    9.6      15 Sep 2019 07:08    TPro  5.6<L>  /  Alb  3.5  /  TBili  0.5  /  DBili  x   /  AST  23  /  ALT  28  /  AlkPhos  86  09-15    LIVER FUNCTIONS - ( 15 Sep 2019 07:08 )  Alb: 3.5 g/dL / Pro: 5.6 g/dL / ALK PHOS: 86 U/L / ALT: 28 U/L / AST: 23 U/L / GGT: x           CARDIAC MARKERS ( 14 Sep 2019 12:50 )  x     / <0.01 ng/mL / x     / x     / x        Urinalysis Basic - ( 14 Sep 2019 14:06 )  Color: Yellow / Appearance: Clear / S.010 / pH: x  Gluc: x / Ketone: Negative  / Bili: Negative / Urobili: Negative mg/dL   Blood: x / Protein: Negative mg/dL / Nitrite: Negative   Leuk Esterase: Negative / RBC: x / WBC x   Sq Epi: x / Non Sq Epi: x / Bacteria: x    MEDICATIONS  (STANDING):  ALBUTerol    90 MICROgram(s) HFA Inhaler 1 Puff(s) Inhalation every 4 hours  memantine 5 milliGRAM(s) Oral two times a day    MEDICATIONS  (PRN):  ALBUTerol    0.083% 2.5 milliGRAM(s) Nebulizer every 6 hours PRN Shortness of Breath  ALBUTerol/ipratropium for Nebulization 3 milliLiter(s) Nebulizer every 6 hours PRN Shortness of Breath and/or Wheezing      ASSESSMENT / PLAN:  ----------------------------------------  79M with a history of dementia and COPD who presented with unresponsiveness found to be hypothermic.    Hypothermia - Unclear etiology. Improved with warming blanket. Thyroid panel was without significant abnormalities. No obvious signs of infection. Urinalysis was without findings indicative of infection. Blood culture results to be followed.    Dementia - On memantine. Quetiapine to be held as a possible contributing factor to hypothermia.    COPD - Inhaled bronchodilator therapy as needed. On tiotropium.    Discussed with the patient and his wife at the bedside.

## 2019-09-16 ENCOUNTER — TRANSCRIPTION ENCOUNTER (OUTPATIENT)
Age: 79
End: 2019-09-16

## 2019-09-16 VITALS — TEMPERATURE: 97 F

## 2019-09-16 LAB — CORTIS AM PEAK SERPL-MCNC: 11.4 UG/DL — SIGNIFICANT CHANGE UP (ref 6–18.4)

## 2019-09-16 PROCEDURE — 99239 HOSP IP/OBS DSCHRG MGMT >30: CPT

## 2019-09-16 RX ORDER — INFLUENZA VIRUS VACCINE 15; 15; 15; 15 UG/.5ML; UG/.5ML; UG/.5ML; UG/.5ML
0.5 SUSPENSION INTRAMUSCULAR ONCE
Refills: 0 | Status: COMPLETED | OUTPATIENT
Start: 2019-09-16 | End: 2019-09-16

## 2019-09-16 RX ORDER — LABETALOL HCL 100 MG
10 TABLET ORAL ONCE
Refills: 0 | Status: COMPLETED | OUTPATIENT
Start: 2019-09-16 | End: 2019-09-16

## 2019-09-16 RX ORDER — QUETIAPINE FUMARATE 200 MG/1
0 TABLET, FILM COATED ORAL
Qty: 0 | Refills: 0 | DISCHARGE

## 2019-09-16 RX ADMIN — INFLUENZA VIRUS VACCINE 0.5 MILLILITER(S): 15; 15; 15; 15 SUSPENSION INTRAMUSCULAR at 14:27

## 2019-09-16 RX ADMIN — MEMANTINE HYDROCHLORIDE 5 MILLIGRAM(S): 10 TABLET ORAL at 05:46

## 2019-09-16 RX ADMIN — Medication 10 MILLIGRAM(S): at 06:38

## 2019-09-16 NOTE — DISCHARGE NOTE PROVIDER - NSDCCPCAREPLAN_GEN_ALL_CORE_FT
PRINCIPAL DISCHARGE DIAGNOSIS  Diagnosis: Hypothermia  Assessment and Plan of Treatment: Continue to monitor while off quetiapine. Follow up with your primary care physician for further management.      SECONDARY DISCHARGE DIAGNOSES  Diagnosis: HLD (hyperlipidemia)  Assessment and Plan of Treatment:     Diagnosis: Dementia  Assessment and Plan of Treatment: Continue on your home medications except for quetiapine.    Diagnosis: COPD (chronic obstructive pulmonary disease)  Assessment and Plan of Treatment:

## 2019-09-16 NOTE — DISCHARGE NOTE NURSING/CASE MANAGEMENT/SOCIAL WORK - PATIENT PORTAL LINK FT
You can access the FollowMyHealth Patient Portal offered by [facility] by registering at the following website: [email or web address]. By joining [facility]’s FollowMyHealth portal, you will also be able to view your health information using other applications (apps) compatible with our system.

## 2019-09-16 NOTE — DISCHARGE NOTE PROVIDER - HOSPITAL COURSE
79M with a prior admission for encephalopathy due to dehydration and poor oral intake who was found unresponsive by his wife. On presentation, Temp(92.2), WBC(5.75), H/H(11.8/37.1), BUN/Cr(22/0.57), Gluc(85). A warming blanket was placed and the patient’s temperature improved. Urinalysis was not indicative of an underlying infection and chest Xray was without acute pulmonary disease. Urine toxicology screen was negative. The patient’s mentation improved with further improvement in his temperature. His home medication of quetiapine was held on admission as a possible contributing factor to the hypothermia. Thyroid function panel was without significant findings. The patient was noted to have improvement in the hypothermia the following day. The patient was discharged home with instructions to follow up with his primary care physician for further management and to discontinue quetiapine until evaluated.            38 minutes total time

## 2019-09-16 NOTE — PROGRESS NOTE ADULT - SUBJECTIVE AND OBJECTIVE BOX
KATH KENDRICKTOMAS  ----------------------------------------  The patient was seen and evaluated for hypothermia.  The patient is in no acute distress.  Denied any chest pain, palpitations, dyspnea, or abdominal pain.  Offers no complaints.    Vital Signs Last 24 Hrs  T(C): 35.4 (16 Sep 2019 05:37), Max: 36.4 (15 Sep 2019 15:53)  T(F): 95.7 (16 Sep 2019 05:37), Max: 97.5 (15 Sep 2019 15:53)  HR: 66 (16 Sep 2019 10:00) (65 - 72)  BP: 127/71 (16 Sep 2019 10:00) (127/71 - 182/92)  BP(mean): --  RR: 16 (16 Sep 2019 10:00) (16 - 18)  SpO2: 96% (16 Sep 2019 10:00) (93% - 99%)    PHYSICAL EXAMINATION:  ----------------------------------------  General appearance: No acute distress, Awake, Alert  HEENT: Normocephalic, Atraumatic, Conjunctiva clear, EOMI  Neck: Supple, No JVD, No tenderness  Lungs: Breath sound equal bilaterally, No wheezes, No rales  Cardiovascular: S1S2, Regular rhythm  Abdomen: Soft, Nontender, Nondistended, No guarding/rebound, Positive bowel sounds  Extremities: No clubbing, No cyanosis, No edema, No calf tenderness  Neuro: Strength equal bilaterally, No tremors  Psychiatric: Appropriate mood, Normal affect    LABORATORY STUDIES:  ----------------------------------------             11.3   5.56  )-----------( 123      ( 15 Sep 2019 07:08 )             35.6     09-15    142  |  105  |  18.0  ----------------------------<  63<L>  4.4   |  28.0  |  0.64    Ca    9.6      15 Sep 2019 07:08    TPro  5.6<L>  /  Alb  3.5  /  TBili  0.5  /  DBili  x   /  AST  23  /  ALT  28  /  AlkPhos  86  09-15    LIVER FUNCTIONS - ( 15 Sep 2019 07:08 )  Alb: 3.5 g/dL / Pro: 5.6 g/dL / ALK PHOS: 86 U/L / ALT: 28 U/L / AST: 23 U/L / GGT: x           CARDIAC MARKERS ( 14 Sep 2019 12:50 )  x     / <0.01 ng/mL / x     / x     / x        Urinalysis Basic - ( 14 Sep 2019 14:06 )  Color: Yellow / Appearance: Clear / S.010 / pH: x  Gluc: x / Ketone: Negative  / Bili: Negative / Urobili: Negative mg/dL   Blood: x / Protein: Negative mg/dL / Nitrite: Negative   Leuk Esterase: Negative / RBC: x / WBC x   Sq Epi: x / Non Sq Epi: x / Bacteria: x    MEDICATIONS  (STANDING):  ALBUTerol    90 MICROgram(s) HFA Inhaler 1 Puff(s) Inhalation every 4 hours  influenza   Vaccine 0.5 milliLiter(s) IntraMuscular once  memantine 5 milliGRAM(s) Oral two times a day    MEDICATIONS  (PRN):  ALBUTerol    0.083% 2.5 milliGRAM(s) Nebulizer every 6 hours PRN Shortness of Breath  ALBUTerol/ipratropium for Nebulization 3 milliLiter(s) Nebulizer every 6 hours PRN Shortness of Breath and/or Wheezing      ASSESSMENT / PLAN:  ----------------------------------------  79M with a history of dementia and COPD who presented with unresponsiveness found to be hypothermic.    Hypothermia - Unclear etiology. Improved with warming blanket. Thyroid panel was without significant abnormalities. Cortisol level within normal limits. No obvious signs of infection, awaiting blood culture results.    Dementia - On memantine. Quetiapine to be held as a possible contributing factor to hypothermia.    COPD - Inhaled bronchodilator therapy as needed. On tiotropium. KATH KENDRICKTOMAS  ----------------------------------------  The patient was seen and evaluated for hypothermia.  The patient is in no acute distress.  Denied any chest pain, palpitations, dyspnea, or abdominal pain.  Offers no complaints.    Vital Signs Last 24 Hrs  T(C): 35.4 (16 Sep 2019 05:37), Max: 36.4 (15 Sep 2019 15:53)  T(F): 95.7 (16 Sep 2019 05:37), Max: 97.5 (15 Sep 2019 15:53)  HR: 66 (16 Sep 2019 10:00) (65 - 72)  BP: 127/71 (16 Sep 2019 10:00) (127/71 - 182/92)  BP(mean): --  RR: 16 (16 Sep 2019 10:00) (16 - 18)  SpO2: 96% (16 Sep 2019 10:00) (93% - 99%)    PHYSICAL EXAMINATION:  ----------------------------------------  General appearance: No acute distress, Awake, Alert  HEENT: Normocephalic, Atraumatic, Conjunctiva clear, EOMI  Neck: Supple, No JVD, No tenderness  Lungs: Breath sound equal bilaterally, No wheezes, No rales  Cardiovascular: S1S2, Regular rhythm  Abdomen: Soft, Nontender, Nondistended, No guarding/rebound, Positive bowel sounds  Extremities: No clubbing, No cyanosis, No edema, No calf tenderness  Neuro: Strength equal bilaterally, No tremors  Psychiatric: Appropriate mood, Normal affect    LABORATORY STUDIES:  ----------------------------------------             11.3   5.56  )-----------( 123      ( 15 Sep 2019 07:08 )             35.6     09-15    142  |  105  |  18.0  ----------------------------<  63<L>  4.4   |  28.0  |  0.64    Ca    9.6      15 Sep 2019 07:08    TPro  5.6<L>  /  Alb  3.5  /  TBili  0.5  /  DBili  x   /  AST  23  /  ALT  28  /  AlkPhos  86  09-15    LIVER FUNCTIONS - ( 15 Sep 2019 07:08 )  Alb: 3.5 g/dL / Pro: 5.6 g/dL / ALK PHOS: 86 U/L / ALT: 28 U/L / AST: 23 U/L / GGT: x           CARDIAC MARKERS ( 14 Sep 2019 12:50 )  x     / <0.01 ng/mL / x     / x     / x        Urinalysis Basic - ( 14 Sep 2019 14:06 )  Color: Yellow / Appearance: Clear / S.010 / pH: x  Gluc: x / Ketone: Negative  / Bili: Negative / Urobili: Negative mg/dL   Blood: x / Protein: Negative mg/dL / Nitrite: Negative   Leuk Esterase: Negative / RBC: x / WBC x   Sq Epi: x / Non Sq Epi: x / Bacteria: x    MEDICATIONS  (STANDING):  ALBUTerol    90 MICROgram(s) HFA Inhaler 1 Puff(s) Inhalation every 4 hours  influenza   Vaccine 0.5 milliLiter(s) IntraMuscular once  memantine 5 milliGRAM(s) Oral two times a day    MEDICATIONS  (PRN):  ALBUTerol    0.083% 2.5 milliGRAM(s) Nebulizer every 6 hours PRN Shortness of Breath  ALBUTerol/ipratropium for Nebulization 3 milliLiter(s) Nebulizer every 6 hours PRN Shortness of Breath and/or Wheezing      ASSESSMENT / PLAN:  ----------------------------------------  79M with a history of dementia and COPD who presented with unresponsiveness found to be hypothermic.    Hypothermia - Unclear etiology. Improved with warming blanket. Thyroid panel was without significant abnormalities. Cortisol level within normal limits. No obvious signs of infection, awaiting blood culture results.    Dementia - On memantine. Quetiapine to be held as a possible contributing factor to hypothermia.    COPD - Inhaled bronchodilator therapy as needed. On tiotropium.    Discussed with the patient and his wife at the bedside.

## 2019-09-19 LAB
CULTURE RESULTS: SIGNIFICANT CHANGE UP
CULTURE RESULTS: SIGNIFICANT CHANGE UP
SPECIMEN SOURCE: SIGNIFICANT CHANGE UP
SPECIMEN SOURCE: SIGNIFICANT CHANGE UP

## 2019-10-02 PROCEDURE — 86901 BLOOD TYPING SEROLOGIC RH(D): CPT

## 2019-10-02 PROCEDURE — 82533 TOTAL CORTISOL: CPT

## 2019-10-02 PROCEDURE — 86850 RBC ANTIBODY SCREEN: CPT

## 2019-10-02 PROCEDURE — 85027 COMPLETE CBC AUTOMATED: CPT

## 2019-10-02 PROCEDURE — 84443 ASSAY THYROID STIM HORMONE: CPT

## 2019-10-02 PROCEDURE — 84480 ASSAY TRIIODOTHYRONINE (T3): CPT

## 2019-10-02 PROCEDURE — 84484 ASSAY OF TROPONIN QUANT: CPT

## 2019-10-02 PROCEDURE — 99285 EMERGENCY DEPT VISIT HI MDM: CPT

## 2019-10-02 PROCEDURE — 83690 ASSAY OF LIPASE: CPT

## 2019-10-02 PROCEDURE — 81003 URINALYSIS AUTO W/O SCOPE: CPT

## 2019-10-02 PROCEDURE — 80053 COMPREHEN METABOLIC PANEL: CPT

## 2019-10-02 PROCEDURE — 36415 COLL VENOUS BLD VENIPUNCTURE: CPT

## 2019-10-02 PROCEDURE — 84436 ASSAY OF TOTAL THYROXINE: CPT

## 2019-10-02 PROCEDURE — 70450 CT HEAD/BRAIN W/O DYE: CPT

## 2019-10-02 PROCEDURE — 82962 GLUCOSE BLOOD TEST: CPT

## 2019-10-02 PROCEDURE — 90686 IIV4 VACC NO PRSV 0.5 ML IM: CPT

## 2019-10-02 PROCEDURE — 93005 ELECTROCARDIOGRAM TRACING: CPT

## 2019-10-02 PROCEDURE — 87040 BLOOD CULTURE FOR BACTERIA: CPT

## 2019-10-02 PROCEDURE — 80307 DRUG TEST PRSMV CHEM ANLYZR: CPT

## 2019-10-02 PROCEDURE — 86900 BLOOD TYPING SEROLOGIC ABO: CPT

## 2019-10-02 PROCEDURE — 71045 X-RAY EXAM CHEST 1 VIEW: CPT

## 2019-11-27 ENCOUNTER — INPATIENT (INPATIENT)
Facility: HOSPITAL | Age: 79
LOS: 13 days | Discharge: HOME | DRG: 189 | End: 2019-12-11
Admitting: HOSPITALIST
Payer: MEDICARE

## 2019-11-27 VITALS
SYSTOLIC BLOOD PRESSURE: 110 MMHG | DIASTOLIC BLOOD PRESSURE: 72 MMHG | RESPIRATION RATE: 18 BRPM | TEMPERATURE: 98 F | HEART RATE: 72 BPM | OXYGEN SATURATION: 100 %

## 2019-11-27 DIAGNOSIS — B97.4 RESPIRATORY SYNCYTIAL VIRUS AS THE CAUSE OF DISEASES CLASSIFIED ELSEWHERE: ICD-10-CM

## 2019-11-27 LAB
ALBUMIN SERPL ELPH-MCNC: 3.5 G/DL — SIGNIFICANT CHANGE UP (ref 3.3–5.2)
ALP SERPL-CCNC: 122 U/L — HIGH (ref 40–120)
ALT FLD-CCNC: 21 U/L — SIGNIFICANT CHANGE UP
ANION GAP SERPL CALC-SCNC: 10 MMOL/L — SIGNIFICANT CHANGE UP (ref 5–17)
ANISOCYTOSIS BLD QL: SLIGHT — SIGNIFICANT CHANGE UP
AST SERPL-CCNC: 29 U/L — SIGNIFICANT CHANGE UP
BASE EXCESS BLDA CALC-SCNC: 3 MMOL/L — HIGH (ref -2–2)
BASE EXCESS BLDV CALC-SCNC: 4.2 MMOL/L — HIGH (ref -2–2)
BASO STIPL BLD QL SMEAR: PRESENT — SIGNIFICANT CHANGE UP
BASOPHILS # BLD AUTO: 0.05 K/UL — SIGNIFICANT CHANGE UP (ref 0–0.2)
BASOPHILS NFR BLD AUTO: 0.8 % — SIGNIFICANT CHANGE UP (ref 0–2)
BILIRUB SERPL-MCNC: 0.3 MG/DL — LOW (ref 0.4–2)
BLOOD GAS COMMENTS ARTERIAL: SIGNIFICANT CHANGE UP
BUN SERPL-MCNC: 23 MG/DL — HIGH (ref 8–20)
CALCIUM SERPL-MCNC: 9.9 MG/DL — SIGNIFICANT CHANGE UP (ref 8.6–10.2)
CHLORIDE SERPL-SCNC: 100 MMOL/L — SIGNIFICANT CHANGE UP (ref 98–107)
CO2 SERPL-SCNC: 29 MMOL/L — SIGNIFICANT CHANGE UP (ref 22–29)
CREAT SERPL-MCNC: 0.74 MG/DL — SIGNIFICANT CHANGE UP (ref 0.5–1.3)
EOSINOPHIL # BLD AUTO: 0 K/UL — SIGNIFICANT CHANGE UP (ref 0–0.5)
EOSINOPHIL NFR BLD AUTO: 0 % — SIGNIFICANT CHANGE UP (ref 0–6)
FLU A RESULT: SIGNIFICANT CHANGE UP
FLU A RESULT: SIGNIFICANT CHANGE UP
FLUAV AG NPH QL: SIGNIFICANT CHANGE UP
FLUBV AG NPH QL: SIGNIFICANT CHANGE UP
GAS PNL BLDA: SIGNIFICANT CHANGE UP
GAS PNL BLDV: SIGNIFICANT CHANGE UP
GIANT PLATELETS BLD QL SMEAR: PRESENT — SIGNIFICANT CHANGE UP
GLUCOSE SERPL-MCNC: 86 MG/DL — SIGNIFICANT CHANGE UP (ref 70–115)
HCO3 BLDA-SCNC: 27 MMOL/L — HIGH (ref 20–26)
HCO3 BLDV-SCNC: 28 MMOL/L — HIGH (ref 20–26)
HCT VFR BLD CALC: 34.5 % — LOW (ref 39–50)
HGB BLD-MCNC: 10.6 G/DL — LOW (ref 13–17)
HOROWITZ INDEX BLDA+IHG-RTO: 30 — SIGNIFICANT CHANGE UP
HYPOCHROMIA BLD QL: SLIGHT — SIGNIFICANT CHANGE UP
LYMPHOCYTES # BLD AUTO: 0.36 K/UL — LOW (ref 1–3.3)
LYMPHOCYTES # BLD AUTO: 6.1 % — LOW (ref 13–44)
MACROCYTES BLD QL: SLIGHT — SIGNIFICANT CHANGE UP
MANUAL SMEAR VERIFICATION: SIGNIFICANT CHANGE UP
MCHC RBC-ENTMCNC: 30.5 PG — SIGNIFICANT CHANGE UP (ref 27–34)
MCHC RBC-ENTMCNC: 30.7 GM/DL — LOW (ref 32–36)
MCV RBC AUTO: 99.1 FL — SIGNIFICANT CHANGE UP (ref 80–100)
MONOCYTES # BLD AUTO: 0.36 K/UL — SIGNIFICANT CHANGE UP (ref 0–0.9)
MONOCYTES NFR BLD AUTO: 6.1 % — SIGNIFICANT CHANGE UP (ref 2–14)
NEUTROPHILS # BLD AUTO: 5.11 K/UL — SIGNIFICANT CHANGE UP (ref 1.8–7.4)
NEUTROPHILS NFR BLD AUTO: 86.1 % — HIGH (ref 43–77)
NEUTS BAND # BLD: 0.9 % — SIGNIFICANT CHANGE UP (ref 0–8)
NT-PROBNP SERPL-SCNC: 482 PG/ML — HIGH (ref 0–300)
PCO2 BLDA: 64 MMHG — HIGH (ref 35–45)
PCO2 BLDV: 67 MMHG — HIGH (ref 35–50)
PH BLDA: 7.29 — LOW (ref 7.35–7.45)
PH BLDV: 7.29 — LOW (ref 7.32–7.43)
PLAT MORPH BLD: NORMAL — SIGNIFICANT CHANGE UP
PLATELET # BLD AUTO: 99 K/UL — LOW (ref 150–400)
PO2 BLDA: 86 MMHG — SIGNIFICANT CHANGE UP (ref 83–108)
PO2 BLDV: 88 MMHG — HIGH (ref 25–45)
POLYCHROMASIA BLD QL SMEAR: SLIGHT — SIGNIFICANT CHANGE UP
POTASSIUM SERPL-MCNC: 5 MMOL/L — SIGNIFICANT CHANGE UP (ref 3.5–5.3)
POTASSIUM SERPL-SCNC: 5 MMOL/L — SIGNIFICANT CHANGE UP (ref 3.5–5.3)
PROT SERPL-MCNC: 6.2 G/DL — LOW (ref 6.6–8.7)
RBC # BLD: 3.48 M/UL — LOW (ref 4.2–5.8)
RBC # FLD: 16.7 % — HIGH (ref 10.3–14.5)
RBC BLD AUTO: ABNORMAL
RSV RESULT: DETECTED
RSV RNA RESP QL NAA+PROBE: DETECTED
SAO2 % BLDA: 97 % — SIGNIFICANT CHANGE UP (ref 95–99)
SAO2 % BLDV: 96 % — SIGNIFICANT CHANGE UP
SODIUM SERPL-SCNC: 139 MMOL/L — SIGNIFICANT CHANGE UP (ref 135–145)
WBC # BLD: 5.87 K/UL — SIGNIFICANT CHANGE UP (ref 3.8–10.5)
WBC # FLD AUTO: 5.87 K/UL — SIGNIFICANT CHANGE UP (ref 3.8–10.5)

## 2019-11-27 PROCEDURE — 99223 1ST HOSP IP/OBS HIGH 75: CPT

## 2019-11-27 PROCEDURE — 71045 X-RAY EXAM CHEST 1 VIEW: CPT | Mod: 26

## 2019-11-27 PROCEDURE — 99497 ADVNCD CARE PLAN 30 MIN: CPT | Mod: 25

## 2019-11-27 PROCEDURE — 99291 CRITICAL CARE FIRST HOUR: CPT

## 2019-11-27 RX ORDER — CEPHALEXIN 500 MG
500 CAPSULE ORAL THREE TIMES A DAY
Refills: 0 | Status: DISCONTINUED | OUTPATIENT
Start: 2019-11-27 | End: 2019-11-30

## 2019-11-27 RX ORDER — IPRATROPIUM/ALBUTEROL SULFATE 18-103MCG
3 AEROSOL WITH ADAPTER (GRAM) INHALATION EVERY 6 HOURS
Refills: 0 | Status: DISCONTINUED | OUTPATIENT
Start: 2019-11-27 | End: 2019-12-11

## 2019-11-27 RX ORDER — IPRATROPIUM/ALBUTEROL SULFATE 18-103MCG
3 AEROSOL WITH ADAPTER (GRAM) INHALATION ONCE
Refills: 0 | Status: COMPLETED | OUTPATIENT
Start: 2019-11-27 | End: 2019-11-27

## 2019-11-27 RX ORDER — HEPARIN SODIUM 5000 [USP'U]/ML
5000 INJECTION INTRAVENOUS; SUBCUTANEOUS EVERY 8 HOURS
Refills: 0 | Status: DISCONTINUED | OUTPATIENT
Start: 2019-11-27 | End: 2019-12-11

## 2019-11-27 RX ORDER — ALBUTEROL 90 UG/1
2 AEROSOL, METERED ORAL EVERY 4 HOURS
Refills: 0 | Status: DISCONTINUED | OUTPATIENT
Start: 2019-11-27 | End: 2019-12-11

## 2019-11-27 RX ORDER — MAGNESIUM SULFATE 500 MG/ML
2 VIAL (ML) INJECTION ONCE
Refills: 0 | Status: COMPLETED | OUTPATIENT
Start: 2019-11-27 | End: 2019-11-27

## 2019-11-27 RX ORDER — MEMANTINE HYDROCHLORIDE 10 MG/1
5 TABLET ORAL
Refills: 0 | Status: DISCONTINUED | OUTPATIENT
Start: 2019-11-27 | End: 2019-12-11

## 2019-11-27 RX ADMIN — HEPARIN SODIUM 5000 UNIT(S): 5000 INJECTION INTRAVENOUS; SUBCUTANEOUS at 22:58

## 2019-11-27 RX ADMIN — Medication 50 GRAM(S): at 19:41

## 2019-11-27 RX ADMIN — Medication 125 MILLIGRAM(S): at 17:08

## 2019-11-27 RX ADMIN — Medication 3 MILLILITER(S): at 17:00

## 2019-11-27 RX ADMIN — Medication 500 MILLIGRAM(S): at 22:58

## 2019-11-27 RX ADMIN — Medication 3 MILLILITER(S): at 16:50

## 2019-11-27 RX ADMIN — Medication 3 MILLILITER(S): at 17:08

## 2019-11-27 NOTE — ED PROVIDER NOTE - PROGRESS NOTE DETAILS
Molina: pt improved on bipap, improved work of breathing, will repeat abg. +rsv. xr wnl. admitted to stepdown Dr. Sotomayor.

## 2019-11-27 NOTE — ED PROVIDER NOTE - CLINICAL SUMMARY MEDICAL DECISION MAKING FREE TEXT BOX
several days uri symptoms, audible wheezing, satting 91% on room air, improves with NC but some labored breathing. blood gas with ph 7.29, co2 in mid 60's. at mental baseline per wife, no cp/cardiac symtpoms. cxr clear. will send flu, nebs, steroids, bipap, reassess.

## 2019-11-27 NOTE — H&P ADULT - HISTORY OF PRESENT ILLNESS
78 y/o male with PMH of dementia, COPD (not on home O2) was brought to the ED due to difficulty breathing. HPI as per son at bed side because patient has dementia; poor historian. As per son, patient has been having difficulty breathing, wheezing for about 3-5 days now but his symptoms worsen today, his saturation was in the 60s. Patient has many sick contact, his wife was just recovering from flu-like symptoms. No fever, chills, chest pain, nausea, vomiting, abdominal pain, change in bowel/urinary habit, recent travel reported.

## 2019-11-27 NOTE — ED PROVIDER NOTE - OBJECTIVE STATEMENT
80 y/o male hx copd ( no home O2), dementia, hld c/o 5 days of uri symptoms with worsening wheezing/sob over past 2-3 days. Per wife many recent sick contacts going around, pt is last one in family to get symtpoms. Using nebs at home past day with minimal improvement. No pain, no abd pain, no hx dvt/pe, no leg pain/swelling. Denies cardiac hx, chf, pnd, cardiologist. Denies fever.     ROS: . No eye pain/changes in vision, No ear pain/sore throat/dysphagia, No chest pain/palpitations. No abdominal pain, N/V/D, no black/bloody bm. No dysuria/frequency/discharge, No headache. No Dizziness.    No rashes or breaks in skin. No numbness/tingling/weakness.

## 2019-11-27 NOTE — H&P ADULT - ASSESSMENT
78 y/o male with PMH of dementia, COPD (not on home O2) was brought to the ED due to difficulty breathing. HPI as per son at bed side because patient has dementia; poor historian. As per son, patient has been having difficulty breathing, wheezing for about 3-5 days now but his symptoms worsen today, his saturation was in the 60s. Patient has many sick contact, his wife was just recovering from flu-like symptoms. No fever, chills, chest pain, nausea, vomiting, abdominal pain, change in bowel/urinary habit, recent travel reported.     Acute hypercapnic respiratory failure due to COPD exacerbation likely 2/2 RSV   Admit to step down   Duoneb, Mg and steroid given in the ED   Will continue methylprednisolone 40mg q8h; taper as needed. Monitor blood glucose   Patient initially placed on O2 via NC but later placed on BiPAP due to increase work of breathing. Patient saturating well on BiPAP; will keep overnight and transition to NC in AM. Will consult MICU if unable to transition to NC.   Duoneb standing   Albuterol PRN   Patient on Cephalexin 500mg tid for 7 days likely for URI symptoms as per son. He is on day 5, will continue     COPD exacerbation   Plan as above     RSV   Tylenol PRN     Dementia   Continue Memantine 5mg bid   Galantamine 8mg bid (non-formulary) please encourage family to bring in AM     Supportive   DVT prophylaxis: heparin sc   Diet: regular     Code status: full code

## 2019-11-27 NOTE — ED ADULT NURSE NOTE - OBJECTIVE STATEMENT
Pt arrives to ED c/o worsening SOB due to cold like symptoms. Pt with hx of COPD and on Spiriva, was medicated with OTC meds Mussinex & Robitussin but symptoms not improving.  Pt with audible wheezes unable to expectorate or cough "hard enough".  Pt A & Ox2. breathing labored at this time. MD aware medication ordered.

## 2019-11-27 NOTE — ED PROVIDER NOTE - PHYSICAL EXAMINATION
Gen: awake, alert, non toxic, increased work of breathing, audible wheezing/wet cough  HEENT: Mucous membranes moist, pink conjunctivae, EOMI  CV: RRR, nl s1/s2.  Resp: diffuse wheezing, retractions, speaking full sentences, some labored breathing.   GI: Abdomen soft, NT, ND. No rebound, no guarding  : No CVAT  Neuro: A&O x 3, moving all 4 extremities  MSK: No spine or joint tenderness to palpation  Skin: No rashes. intact and perfused.

## 2019-11-27 NOTE — ED ADULT NURSE NOTE - NSIMPLEMENTINTERV_GEN_ALL_ED
Implemented All Fall Risk Interventions:  Rodney to call system. Call bell, personal items and telephone within reach. Instruct patient to call for assistance. Room bathroom lighting operational. Non-slip footwear when patient is off stretcher. Physically safe environment: no spills, clutter or unnecessary equipment. Stretcher in lowest position, wheels locked, appropriate side rails in place. Provide visual cue, wrist band, yellow gown, etc. Monitor gait and stability. Monitor for mental status changes and reorient to person, place, and time. Review medications for side effects contributing to fall risk. Reinforce activity limits and safety measures with patient and family.

## 2019-11-27 NOTE — ED ADULT NURSE REASSESSMENT NOTE - NS ED NURSE REASSESS COMMENT FT1
Pt with what appears to be worsening SOB, O2 level reading 99% on breathing treatment but wheezing has become more audible and congestion sounds more "wet" MD aware pt placed on bi-pap with treatment in place.  Pt tolerating well.

## 2019-11-28 DIAGNOSIS — J44.9 CHRONIC OBSTRUCTIVE PULMONARY DISEASE, UNSPECIFIED: ICD-10-CM

## 2019-11-28 DIAGNOSIS — B97.4 RESPIRATORY SYNCYTIAL VIRUS AS THE CAUSE OF DISEASES CLASSIFIED ELSEWHERE: ICD-10-CM

## 2019-11-28 DIAGNOSIS — F03.90 UNSPECIFIED DEMENTIA WITHOUT BEHAVIORAL DISTURBANCE: ICD-10-CM

## 2019-11-28 DIAGNOSIS — J96.92 RESPIRATORY FAILURE, UNSPECIFIED WITH HYPERCAPNIA: ICD-10-CM

## 2019-11-28 LAB
BASE EXCESS BLDA CALC-SCNC: 5.9 MMOL/L — HIGH (ref -2–2)
BASE EXCESS BLDA CALC-SCNC: 6.4 MMOL/L — HIGH (ref -2–2)
BLOOD GAS COMMENTS ARTERIAL: SIGNIFICANT CHANGE UP
BLOOD GAS COMMENTS ARTERIAL: SIGNIFICANT CHANGE UP
GAS PNL BLDA: SIGNIFICANT CHANGE UP
GAS PNL BLDA: SIGNIFICANT CHANGE UP
HCO3 BLDA-SCNC: 30 MMOL/L — HIGH (ref 20–26)
HCO3 BLDA-SCNC: 30 MMOL/L — HIGH (ref 20–26)
HOROWITZ INDEX BLDA+IHG-RTO: 30 — SIGNIFICANT CHANGE UP
HOROWITZ INDEX BLDA+IHG-RTO: 30 — SIGNIFICANT CHANGE UP
PCO2 BLDA: 60 MMHG — HIGH (ref 35–45)
PCO2 BLDA: 63 MMHG — HIGH (ref 35–45)
PH BLDA: 7.34 — LOW (ref 7.35–7.45)
PH BLDA: 7.35 — SIGNIFICANT CHANGE UP (ref 7.35–7.45)
PO2 BLDA: 86 MMHG — SIGNIFICANT CHANGE UP (ref 83–108)
PO2 BLDA: 96 MMHG — SIGNIFICANT CHANGE UP (ref 83–108)
SAO2 % BLDA: 97 % — SIGNIFICANT CHANGE UP (ref 95–99)
SAO2 % BLDA: 98 % — SIGNIFICANT CHANGE UP (ref 95–99)

## 2019-11-28 PROCEDURE — 99233 SBSQ HOSP IP/OBS HIGH 50: CPT

## 2019-11-28 RX ADMIN — Medication 40 MILLIGRAM(S): at 13:18

## 2019-11-28 RX ADMIN — Medication 3 MILLILITER(S): at 04:26

## 2019-11-28 RX ADMIN — Medication 2 MILLIGRAM(S): at 21:17

## 2019-11-28 RX ADMIN — HEPARIN SODIUM 5000 UNIT(S): 5000 INJECTION INTRAVENOUS; SUBCUTANEOUS at 22:13

## 2019-11-28 RX ADMIN — HEPARIN SODIUM 5000 UNIT(S): 5000 INJECTION INTRAVENOUS; SUBCUTANEOUS at 06:26

## 2019-11-28 RX ADMIN — Medication 40 MILLIGRAM(S): at 06:26

## 2019-11-28 RX ADMIN — Medication 3 MILLILITER(S): at 09:32

## 2019-11-28 RX ADMIN — HEPARIN SODIUM 5000 UNIT(S): 5000 INJECTION INTRAVENOUS; SUBCUTANEOUS at 13:18

## 2019-11-28 RX ADMIN — Medication 40 MILLIGRAM(S): at 22:13

## 2019-11-28 RX ADMIN — Medication 3 MILLILITER(S): at 14:51

## 2019-11-28 RX ADMIN — Medication 3 MILLILITER(S): at 21:02

## 2019-11-28 NOTE — ED ADULT NURSE REASSESSMENT NOTE - NS ED NURSE REASSESS COMMENT FT1
MD. Smiley called to ask if he saw pt. as per MD. pt. can be discontinued from 1:1, ABG to be done. respiratory therapist called.

## 2019-11-28 NOTE — ED ADULT NURSE REASSESSMENT NOTE - NS ED NURSE REASSESS COMMENT FT1
pt. received from night RN. pt. received ativan from night RN. pt. not able to be aroused at this time. pt. on BiPAP. breathing even and unlabored. pt. on 1:1. awaiting bed.

## 2019-11-28 NOTE — CHART NOTE - NSCHARTNOTEFT_GEN_A_CORE
Pt remains lethargic, moves extremities to noxious stimuli.  ABG noted Blood Gas Profile - Arterial (11.28.19 @ 11:22)    pH, Arterial: 7.34    pCO2, Arterial: 63 mmHg    pO2, Arterial: 96 mmHg    HCO3, Arterial: 30 mmoL/L    Base Excess, Arterial: 6.4 mmol/L    Oxygen Saturation, Arterial: 98 %    FIO2, Arterial: 30    Blood Gas Comments Arterial: bipap 12/6 30%    Blood Gas Source Arterial: Arterial    Unable to wean off BIPAP with persistent hypercarbic respiratory failure.  Attempted to reach family : [phone] no answer and [phone] unable to leave message despite multiple attempts.   MICU evaluation requested.

## 2019-11-28 NOTE — ED ADULT NURSE REASSESSMENT NOTE - NS ED NURSE REASSESS COMMENT FT1
MD. Smiley called to come and evaluate pt. to see if pt. still needs 1:1. MD. also made aware that pt. is still out of it from the ativan.

## 2019-11-28 NOTE — CONSULT NOTE ADULT - PROBLEM SELECTOR RECOMMENDATION 9
2/2 acute COPD exacerbation. Recommend admission to SDU. Continue w/ Duonebs and Solumedrol. Repeat ABG is compensated: 7.35/60/86/30. Consider transition to HFNC if pt becomes more awake. Maintain SpO2 > 88%.

## 2019-11-28 NOTE — PROGRESS NOTE ADULT - ASSESSMENT
78 y/o male with PMH of dementia, COPD (not on home O2) was brought to the ED due to difficulty breathing, SaO2 noted pCO2 60s, admitted for acute hypoxic and hypercapneic respiratory failure.  RSV +.     Acute hypercapnic respiratory failure due to COPD exacerbation likely 2/2 RSV   Continue IV Steroids, nebulizers, supportive care.  ABG ordered - if no improvement on BIPAP, would consider ICU evaluation.  Per H&P, pt is full code.      COPD exacerbation Plan as above   RSV Tylenol PRN   Dementia with agitation - Continue Memantine 5mg bid.  Haldol prn. Place on enhanced supervision.   Supportive   DVT prophylaxis: heparin sc   Diet: regular   Code status: full code

## 2019-11-28 NOTE — ED ADULT NURSE REASSESSMENT NOTE - NS ED NURSE REASSESS COMMENT FT1
Patient resting comfortably and calm on stretcher, PT AxO4, VSS, NSR on cardiac monitor. Pt appears in no distress at this time.  Safety measures taken, bed in low position, call bell within reach, side rails up x2. . Will continue to monitor.

## 2019-11-28 NOTE — ED ADULT NURSE REASSESSMENT NOTE - NS ED NURSE REASSESS COMMENT FT1
MD. Smiley called to make aware of patient's rectal temp, pt. placed on bear hugger. MD. to call ICU to evaluate pt. for ABG results.

## 2019-11-28 NOTE — ED ADULT NURSE REASSESSMENT NOTE - NS ED NURSE REASSESS COMMENT FT1
report received from off going RN CB, pt received sleeping, in no apparent distress. remains on BiPAP at this time, tolerating mask well. pt remains on cardiac monitor and . pt remains on droplet isolation precautions for +RSV at this time. pt hard to arouse during the day per day RN, pt nonverbal at this time, arousable to physical stimuli, able to follow commands at this time. O2 sat maintaining above 95% on BiPAP. pt provided with warm blankets at this time. all safety measures maintained.

## 2019-11-28 NOTE — PROGRESS NOTE ADULT - SUBJECTIVE AND OBJECTIVE BOX
Patient: KATH SCHULER [id number] 79y Male                           Internal Medicine Hospitalist Progress Note    Initial HPI:  78 y/o male with PMH of dementia, COPD (not on home O2) was brought to the ED due to difficulty breathing. HPI as per son at bed side because patient has dementia; poor historian. As per son, patient has been having difficulty breathing, wheezing for about 3-5 days now but his symptoms worsen today, his saturation was in the 60s. Patient has many sick contact, his wife was just recovering from flu-like symptoms. No fever, chills, chest pain, nausea, vomiting, abdominal pain, change in bowel/urinary habit, recent travel reported. (27 Nov 2019 21:33)    Interval History:  Remains on BIPAP.  1:1 aide at bedside.  No agitation.  Not taking po intake.  Offers no complaints.     ____________________PHYSICAL EXAM:  Vitals reviewed as indicated below  GENERAL:  NAD arousable, moves 4 extremities to command.   HEENT: NCAT  CARDIOVASCULAR:  S1, S2  LUNGS: coarse BS b/l  ABDOMEN:  soft, (-) tenderness, (-) distension, (+) bowel sounds, (-) guarding, (-) rebound (-) rigidity  EXTREMITIES:  no cyanosis / clubbing / edema.   ____________________      BACKGROUND:  HEALTH ISSUES - PROBLEM Dx:        Allergies    No Known Allergies    Intolerances      PAST MEDICAL & SURGICAL HISTORY:  HLD (hyperlipidemia)  COPD (chronic obstructive pulmonary disease)  Dementia  No significant past surgical history        VITALS:  Vital Signs Last 24 Hrs  T(C): 36.4 (28 Nov 2019 06:24), Max: 36.7 (27 Nov 2019 23:04)  T(F): 97.6 (28 Nov 2019 06:24), Max: 98 (27 Nov 2019 23:04)  HR: 66 (28 Nov 2019 09:40) (60 - 100)  BP: 123/90 (28 Nov 2019 08:00) (110/72 - 145/62)  BP(mean): --  RR: 18 (28 Nov 2019 08:00) (16 - 18)  SpO2: 99% (28 Nov 2019 09:40) (97% - 100%) Daily     Daily   CAPILLARY BLOOD GLUCOSE        I&O's Summary        LABS:                        10.6   5.87  )-----------( 99       ( 27 Nov 2019 16:49 )             34.5     11-27    139  |  100  |  23.0<H>  ----------------------------<  86  5.0   |  29.0  |  0.74    Ca    9.9      27 Nov 2019 16:49    TPro  6.2<L>  /  Alb  3.5  /  TBili  0.3<L>  /  DBili  x   /  AST  29  /  ALT  21  /  AlkPhos  122<H>  11-27      LIVER FUNCTIONS - ( 27 Nov 2019 16:49 )  Alb: 3.5 g/dL / Pro: 6.2 g/dL / ALK PHOS: 122 U/L / ALT: 21 U/L / AST: 29 U/L / GGT: x                     MEDICATIONS:  MEDICATIONS  (STANDING):  albuterol/ipratropium for Nebulization 3 milliLiter(s) Nebulizer every 6 hours  cephalexin 500 milliGRAM(s) Oral three times a day  heparin  Injectable 5000 Unit(s) SubCutaneous every 8 hours  LORazepam   Injectable 2 milliGRAM(s) IV Push once  memantine 5 milliGRAM(s) Oral two times a day  methylPREDNISolone sodium succinate Injectable 40 milliGRAM(s) IV Push every 8 hours    MEDICATIONS  (PRN):  ALBUTerol    90 MICROgram(s) HFA Inhaler 2 Puff(s) Inhalation every 4 hours PRN Shortness of Breath and/or Wheezing

## 2019-11-28 NOTE — CONSULT NOTE ADULT - PROBLEM SELECTOR RECOMMENDATION 4
w/ agitation. Received Ativan 2mg IV overnight. Continue w/ Memantine 5mg BID. Avoid sedating medications as this may be contributing to pt's change in mental status.

## 2019-11-28 NOTE — CONSULT NOTE ADULT - SUBJECTIVE AND OBJECTIVE BOX
Patient is a 79y old  Male who presents with a chief complaint of COPD exacerbation (28 Nov 2019 09:53)    BRIEF HOSPITAL COURSE: ***    Events last 24 hours: ***    PAST MEDICAL & SURGICAL HISTORY:  HLD (hyperlipidemia)  COPD (chronic obstructive pulmonary disease)  Dementia  No significant past surgical history    Allergies  No Known Allergies    FAMILY HISTORY:  No pertinent family history in first degree relatives    Review of Systems:  Unable to obtain. Pt is unarousable and on BiPAP.    Medications:  cephalexin 500 milliGRAM(s) Oral three times a day  ALBUTerol    90 MICROgram(s) HFA Inhaler 2 Puff(s) Inhalation every 4 hours PRN  albuterol/ipratropium for Nebulization 3 milliLiter(s) Nebulizer every 6 hours  LORazepam   Injectable 2 milliGRAM(s) IV Push once  memantine 5 milliGRAM(s) Oral two times a day  heparin  Injectable 5000 Unit(s) SubCutaneous every 8 hours  methylPREDNISolone sodium succinate Injectable 40 milliGRAM(s) IV Push every 8 hours    ICU Vital Signs Last 24 Hrs  T(C): 36.5 (28 Nov 2019 15:54), Max: 36.7 (27 Nov 2019 23:04)  T(F): 97.7 (28 Nov 2019 15:54), Max: 98 (27 Nov 2019 23:04)  HR: 78 (28 Nov 2019 15:32) (60 - 100)  BP: 119/56 (28 Nov 2019 15:32) (119/56 - 145/62)  BP(mean): --  ABP: --  ABP(mean): --  RR: 19 (28 Nov 2019 15:32) (16 - 19)  SpO2: 95% (28 Nov 2019 15:32) (95% - 100%)    Vital Signs Last 24 Hrs  T(C): 36.5 (28 Nov 2019 15:54), Max: 36.7 (27 Nov 2019 23:04)  T(F): 97.7 (28 Nov 2019 15:54), Max: 98 (27 Nov 2019 23:04)  HR: 78 (28 Nov 2019 15:32) (60 - 100)  BP: 119/56 (28 Nov 2019 15:32) (119/56 - 145/62)  BP(mean): --  RR: 19 (28 Nov 2019 15:32) (16 - 19)  SpO2: 95% (28 Nov 2019 15:32) (95% - 100%)    ABG - ( 28 Nov 2019 15:01 )  pH, Arterial: 7.35  pH, Blood: x     /  pCO2: 60    /  pO2: 86    / HCO3: 30    / Base Excess: 5.9   /  SaO2: 97        I&O's Detail    LABS:                        10.6   5.87  )-----------( 99       ( 27 Nov 2019 16:49 )             34.5     11-27    139  |  100  |  23.0<H>  ----------------------------<  86  5.0   |  29.0  |  0.74    Ca    9.9      27 Nov 2019 16:49    TPro  6.2<L>  /  Alb  3.5  /  TBili  0.3<L>  /  DBili  x   /  AST  29  /  ALT  21  /  AlkPhos  122<H>  11-27    CAPILLARY BLOOD GLUCOSE    CULTURES:    Physical Examination:    General: In no acute distress, breathing comfortably on BiPAP, unarousable.    HEENT: Pupils equal, reactive to light. Symmetric.    PULM: Diminished breath sounds B/L, no significant sputum production.    CVS: Regular rate and rhythm. No murmurs, rubs, or gallops appreciated.    ABD: Soft, nondistended, nontender, normoactive bowel sounds, no masses.    EXT: Mild pedal edema.    SKIN: Warm and well perfused, no rashes noted.    NEURO: A&Ox0, grimaces to noxious stimuli, moves all extremities spontaneously.    RADIOLOGY: ***    CRITICAL CARE TIME SPENT: *** Patient is a 79y old  Male who presents with a chief complaint of COPD exacerbation (28 Nov 2019 09:53)    BRIEF HOSPITAL COURSE: 80 y/o M w/ a PMHx of dementia, COPD (not on home O2) who presented to the ED on 11/27 c/o SOB. History obtained from pt's son at the bedside. Pt's son states that pt has been coughing and experiencing difficulty breathing      HPI as per son at bed side because patient has dementia; poor historian. As per son, patient has been having difficulty breathing, wheezing for about 3-5 days now but his symptoms worsen today, his saturation was in the 60s. Patient has many sick contact, his wife was just recovering from flu-like symptoms. No fever, chills, chest pain, nausea, vomiting, abdominal pain, change in bowel/urinary habit, recent travel reported    Events last 24 hours: ***    PAST MEDICAL & SURGICAL HISTORY:  HLD (hyperlipidemia)  COPD (chronic obstructive pulmonary disease)  Dementia  No significant past surgical history    Allergies  No Known Allergies    FAMILY HISTORY:  No pertinent family history in first degree relatives    Review of Systems:  Unable to obtain. Pt is unarousable and on BiPAP.    Medications:  cephalexin 500 milliGRAM(s) Oral three times a day  ALBUTerol    90 MICROgram(s) HFA Inhaler 2 Puff(s) Inhalation every 4 hours PRN  albuterol/ipratropium for Nebulization 3 milliLiter(s) Nebulizer every 6 hours  LORazepam   Injectable 2 milliGRAM(s) IV Push once  memantine 5 milliGRAM(s) Oral two times a day  heparin  Injectable 5000 Unit(s) SubCutaneous every 8 hours  methylPREDNISolone sodium succinate Injectable 40 milliGRAM(s) IV Push every 8 hours    ICU Vital Signs Last 24 Hrs  T(C): 36.5 (28 Nov 2019 15:54), Max: 36.7 (27 Nov 2019 23:04)  T(F): 97.7 (28 Nov 2019 15:54), Max: 98 (27 Nov 2019 23:04)  HR: 78 (28 Nov 2019 15:32) (60 - 100)  BP: 119/56 (28 Nov 2019 15:32) (119/56 - 145/62)  BP(mean): --  ABP: --  ABP(mean): --  RR: 19 (28 Nov 2019 15:32) (16 - 19)  SpO2: 95% (28 Nov 2019 15:32) (95% - 100%)    Vital Signs Last 24 Hrs  T(C): 36.5 (28 Nov 2019 15:54), Max: 36.7 (27 Nov 2019 23:04)  T(F): 97.7 (28 Nov 2019 15:54), Max: 98 (27 Nov 2019 23:04)  HR: 78 (28 Nov 2019 15:32) (60 - 100)  BP: 119/56 (28 Nov 2019 15:32) (119/56 - 145/62)  BP(mean): --  RR: 19 (28 Nov 2019 15:32) (16 - 19)  SpO2: 95% (28 Nov 2019 15:32) (95% - 100%)    ABG - ( 28 Nov 2019 15:01 )  pH, Arterial: 7.35  pH, Blood: x     /  pCO2: 60    /  pO2: 86    / HCO3: 30    / Base Excess: 5.9   /  SaO2: 97        I&O's Detail    LABS:                        10.6   5.87  )-----------( 99       ( 27 Nov 2019 16:49 )             34.5     11-27    139  |  100  |  23.0<H>  ----------------------------<  86  5.0   |  29.0  |  0.74    Ca    9.9      27 Nov 2019 16:49    TPro  6.2<L>  /  Alb  3.5  /  TBili  0.3<L>  /  DBili  x   /  AST  29  /  ALT  21  /  AlkPhos  122<H>  11-27    CAPILLARY BLOOD GLUCOSE    CULTURES:    Physical Examination:    General: In no acute distress, breathing comfortably on BiPAP, unarousable.    HEENT: Pupils equal, reactive to light. Symmetric.    PULM: Diminished breath sounds B/L, no significant sputum production.    CVS: Regular rate and rhythm. No murmurs, rubs, or gallops appreciated.    ABD: Soft, nondistended, nontender, normoactive bowel sounds, no masses.    EXT: Mild pedal edema.    SKIN: Warm and well perfused, no rashes noted.    NEURO: A&Ox0, grimaces to noxious stimuli, moves all extremities spontaneously.    RADIOLOGY: ***    CRITICAL CARE TIME SPENT: *** Patient is a 79y old  Male who presents with a chief complaint of COPD exacerbation (28 Nov 2019 09:53)    BRIEF HOSPITAL COURSE: 80 y/o M w/ a PMHx of dementia, COPD (not on home O2) who presented to the ED on 11/27 c/o SOB. History obtained from pt's son at the bedside. Pt's son states that pt has been coughing and experiencing difficulty breathing x5 days. Pt's family whom he lives with had all just recently gotten over viral URI illnesses. Yesterday morning, pt's breathing acutely worsened and SpO2 in the 60s (pt's son checked w/ home oximeter) which prompted presentation to ED. In the ED, pt initially placed on NC, but was transitioned to BiPAP due to increased work of breathing. WBC count WNL, RSV (+). Pt received Duoneb, Solumedrol, magnesium, and was started on Cephalexin. Admitted to the medicine team. Overnight, pt became agitated and received Ativan 2mg IV. This morning, pt was unarousable. ABG 7.34/63/96/30 on BiPAP. MICU consult called due to concern for worsening mental status.    Events last 24 hours: Pt seen and examined at the bedside in the ED. History endorsed by pt's son as stated above. Of note, pt's family at the bedside state that this is not uncommon for pt to sleep for an extended period of time. Upon my evaluation of the pt, SpO2 98% on BiPAP (12/6, FiO2 30%) and he was breathing comfortably. Pt was responsive to noxious stimuli, but did not respond to verbal commands. Repeat ABG performed: 7.35/60/86/30. At this time, pt is HD stable and does not require ICU level of care.    PAST MEDICAL & SURGICAL HISTORY:  HLD (hyperlipidemia)  COPD (chronic obstructive pulmonary disease)  Dementia  No significant past surgical history    Allergies  No Known Allergies    FAMILY HISTORY:  No pertinent family history in first degree relatives.    SOCIAL HISTORY:  Lives at home w/ wife, son, and daughter-in-law. Hx of tobacco abuse (quit in 1980s). Denies hx of EtOH and illicit drug use.    Review of Systems:  Unable to obtain. Pt is unarousable and on BiPAP.    Medications:  cephalexin 500 milliGRAM(s) Oral three times a day  ALBUTerol    90 MICROgram(s) HFA Inhaler 2 Puff(s) Inhalation every 4 hours PRN  albuterol/ipratropium for Nebulization 3 milliLiter(s) Nebulizer every 6 hours  LORazepam   Injectable 2 milliGRAM(s) IV Push once  memantine 5 milliGRAM(s) Oral two times a day  heparin  Injectable 5000 Unit(s) SubCutaneous every 8 hours  methylPREDNISolone sodium succinate Injectable 40 milliGRAM(s) IV Push every 8 hours    ICU Vital Signs Last 24 Hrs  T(C): 36.5 (28 Nov 2019 15:54), Max: 36.7 (27 Nov 2019 23:04)  T(F): 97.7 (28 Nov 2019 15:54), Max: 98 (27 Nov 2019 23:04)  HR: 78 (28 Nov 2019 15:32) (60 - 100)  BP: 119/56 (28 Nov 2019 15:32) (119/56 - 145/62)  BP(mean): --  ABP: --  ABP(mean): --  RR: 19 (28 Nov 2019 15:32) (16 - 19)  SpO2: 95% (28 Nov 2019 15:32) (95% - 100%)    Vital Signs Last 24 Hrs  T(C): 36.5 (28 Nov 2019 15:54), Max: 36.7 (27 Nov 2019 23:04)  T(F): 97.7 (28 Nov 2019 15:54), Max: 98 (27 Nov 2019 23:04)  HR: 78 (28 Nov 2019 15:32) (60 - 100)  BP: 119/56 (28 Nov 2019 15:32) (119/56 - 145/62)  BP(mean): --  RR: 19 (28 Nov 2019 15:32) (16 - 19)  SpO2: 95% (28 Nov 2019 15:32) (95% - 100%)    ABG - ( 28 Nov 2019 15:01 )  pH, Arterial: 7.35  pH, Blood: x     /  pCO2: 60    /  pO2: 86    / HCO3: 30    / Base Excess: 5.9   /  SaO2: 97        I&O's Detail    LABS:                        10.6   5.87  )-----------( 99       ( 27 Nov 2019 16:49 )             34.5     11-27    139  |  100  |  23.0<H>  ----------------------------<  86  5.0   |  29.0  |  0.74    Ca    9.9      27 Nov 2019 16:49    TPro  6.2<L>  /  Alb  3.5  /  TBili  0.3<L>  /  DBili  x   /  AST  29  /  ALT  21  /  AlkPhos  122<H>  11-27    CAPILLARY BLOOD GLUCOSE    CULTURES:    Physical Examination:    General: In no acute distress, breathing comfortably on BiPAP, unarousable.    HEENT: Pupils equal, reactive to light. Symmetric.    PULM: Diminished breath sounds B/L, no significant sputum production.    CVS: Regular rate and rhythm. No murmurs, rubs, or gallops appreciated.    ABD: Soft, nondistended, nontender, normoactive bowel sounds, no masses.    EXT: Mild pedal edema.    SKIN: Warm and well perfused, no rashes noted.    NEURO: A&Ox0, grimaces to noxious stimuli, moves all extremities spontaneously.    RADIOLOGY:  < from: Xray Chest 1 View-PORTABLE IMMEDIATE (11.27.19 @ 16:38) >     EXAM:  XR CHEST PORTABLE IMMED 1V                          PROCEDURE DATE:  11/27/2019      INTERPRETATION:  Portable chest radiograph        CLINICAL INFORMATION:   Shortness of breath. Cough.    TECHNIQUE:  Portable  AP view of the chest wasobtained.    COMPARISON: 9/14/2019 and 6/20/2019 chest radiograph available for review.    FINDINGS:   The lungs  are clear gross airspace consolidations or effusions.   Hyperaerated upper lobes/hyperlucency may indicate of emphysematous lung   disease.    The heart and mediastinum are within normal limits.    Visualized osseous structures are intact.    IMPRESSION:   No evidence of active chest disease.   Hyperlucent upper   lobes which may indicate emphysematous lung disease.    ANTONETTE ALEJO M.D., ATTENDING RADIOLOGIST  This document has been electronically signed. Nov 27 2019  4:40PM     < end of copied text >

## 2019-11-28 NOTE — ED ADULT NURSE REASSESSMENT NOTE - NS ED NURSE REASSESS COMMENT FT1
RN. called to patient's bedside. as per family pt. took himself off of BiPAP, pt. placed back on BiPAP. RN. called to patient's bedside. as per family pt. took himself off of BiPAP, pt. more awake, pt. able to state his name but unable to fully answer questions. pt. placed back on BiPAP. pt. sating well.

## 2019-11-29 LAB
ANION GAP SERPL CALC-SCNC: 12 MMOL/L — SIGNIFICANT CHANGE UP (ref 5–17)
BASE EXCESS BLDA CALC-SCNC: 6.2 MMOL/L — HIGH (ref -2–2)
BLOOD GAS COMMENTS ARTERIAL: SIGNIFICANT CHANGE UP
BUN SERPL-MCNC: 31 MG/DL — HIGH (ref 8–20)
CALCIUM SERPL-MCNC: 9.9 MG/DL — SIGNIFICANT CHANGE UP (ref 8.6–10.2)
CHLORIDE SERPL-SCNC: 100 MMOL/L — SIGNIFICANT CHANGE UP (ref 98–107)
CO2 SERPL-SCNC: 28 MMOL/L — SIGNIFICANT CHANGE UP (ref 22–29)
CREAT SERPL-MCNC: 0.73 MG/DL — SIGNIFICANT CHANGE UP (ref 0.5–1.3)
GAS PNL BLDA: SIGNIFICANT CHANGE UP
GLUCOSE BLDC GLUCOMTR-MCNC: 92 MG/DL — SIGNIFICANT CHANGE UP (ref 70–99)
GLUCOSE SERPL-MCNC: 96 MG/DL — SIGNIFICANT CHANGE UP (ref 70–115)
HCO3 BLDA-SCNC: 30 MMOL/L — HIGH (ref 20–26)
HCT VFR BLD CALC: 37.3 % — LOW (ref 39–50)
HGB BLD-MCNC: 11.4 G/DL — LOW (ref 13–17)
HOROWITZ INDEX BLDA+IHG-RTO: 30 — SIGNIFICANT CHANGE UP
MCHC RBC-ENTMCNC: 29.9 PG — SIGNIFICANT CHANGE UP (ref 27–34)
MCHC RBC-ENTMCNC: 30.6 GM/DL — LOW (ref 32–36)
MCV RBC AUTO: 97.9 FL — SIGNIFICANT CHANGE UP (ref 80–100)
PCO2 BLDA: 58 MMHG — HIGH (ref 35–45)
PH BLDA: 7.37 — SIGNIFICANT CHANGE UP (ref 7.35–7.45)
PLATELET # BLD AUTO: 120 K/UL — LOW (ref 150–400)
PO2 BLDA: 90 MMHG — SIGNIFICANT CHANGE UP (ref 83–108)
POTASSIUM SERPL-MCNC: 5.3 MMOL/L — SIGNIFICANT CHANGE UP (ref 3.5–5.3)
POTASSIUM SERPL-SCNC: 5.3 MMOL/L — SIGNIFICANT CHANGE UP (ref 3.5–5.3)
RBC # BLD: 3.81 M/UL — LOW (ref 4.2–5.8)
RBC # FLD: 16.4 % — HIGH (ref 10.3–14.5)
SAO2 % BLDA: 97 % — SIGNIFICANT CHANGE UP (ref 95–99)
SODIUM SERPL-SCNC: 140 MMOL/L — SIGNIFICANT CHANGE UP (ref 135–145)
WBC # BLD: 8.3 K/UL — SIGNIFICANT CHANGE UP (ref 3.8–10.5)
WBC # FLD AUTO: 8.3 K/UL — SIGNIFICANT CHANGE UP (ref 3.8–10.5)

## 2019-11-29 PROCEDURE — 99223 1ST HOSP IP/OBS HIGH 75: CPT

## 2019-11-29 PROCEDURE — 99497 ADVNCD CARE PLAN 30 MIN: CPT

## 2019-11-29 RX ORDER — SODIUM CHLORIDE 9 MG/ML
1000 INJECTION, SOLUTION INTRAVENOUS
Refills: 0 | Status: DISCONTINUED | OUTPATIENT
Start: 2019-11-29 | End: 2019-11-30

## 2019-11-29 RX ADMIN — Medication 40 MILLIGRAM(S): at 22:44

## 2019-11-29 RX ADMIN — HEPARIN SODIUM 5000 UNIT(S): 5000 INJECTION INTRAVENOUS; SUBCUTANEOUS at 05:41

## 2019-11-29 RX ADMIN — Medication 3 MILLILITER(S): at 21:32

## 2019-11-29 RX ADMIN — HEPARIN SODIUM 5000 UNIT(S): 5000 INJECTION INTRAVENOUS; SUBCUTANEOUS at 12:52

## 2019-11-29 RX ADMIN — SODIUM CHLORIDE 75 MILLILITER(S): 9 INJECTION, SOLUTION INTRAVENOUS at 12:52

## 2019-11-29 RX ADMIN — Medication 40 MILLIGRAM(S): at 05:41

## 2019-11-29 RX ADMIN — Medication 3 MILLILITER(S): at 15:34

## 2019-11-29 RX ADMIN — Medication 3 MILLILITER(S): at 02:50

## 2019-11-29 RX ADMIN — Medication 3 MILLILITER(S): at 08:06

## 2019-11-29 RX ADMIN — Medication 40 MILLIGRAM(S): at 12:52

## 2019-11-29 NOTE — PROGRESS NOTE ADULT - ASSESSMENT
80 y/o male with PMH of dementia, COPD (not on home O2) was brought to the ED due to difficulty breathing, SaO2 noted pCO2 60s, admitted for acute hypoxic and hypercapneic respiratory failure.  RSV +.     Acute hypercapnic respiratory failure due to COPD exacerbation likely 2/2 RSV   Continue IV Steroids, nebulizers, supportive care.  Repeat ABG ordered.  He has been evaluated by critical care.  Pulmonary evaluation requested.  Per family full code.    COPD exacerbation Plan as above   RSV Tylenol PRN   Dementia with agitation - His mental status is at baseline per family including daughter at bedside.  Continue Memantine 5mg bid  Dysphagia - pt is too lethargic to take po intake.  Start on IVF.  I discussed concerns if pt continues to be unable to take po intake.  Daughter acknowledges understanding, states he would not have wanted NGT placement.  She will d/w family.    Supportive   DVT prophylaxis: heparin sc   Diet: regular   Code status: full code     The Hospitalist Service will assume care of this patient beginning tomorrow.

## 2019-11-29 NOTE — CONSULT NOTE ADULT - SUBJECTIVE AND OBJECTIVE BOX
PULMONARY CONSULT NOTE      RAJ SCHULER-[id number]    Patient is a 79y old  Male who presents with a chief complaint of COPD exacerbation (29 Nov 2019 09:59)  80 y/o male with PMH of dementia, COPD (not on home O2) was brought to the ED due to difficulty breathing, SaO2 noted pCO2 60s, admitted for acute hypoxic and hypercapneic respiratory failure.  RSV + based upon serologies      INTERVAL HPI/OVERNIGHT EVENTS:    MEDICATIONS  (STANDING):  albuterol/ipratropium for Nebulization 3 milliLiter(s) Nebulizer every 6 hours  cephalexin 500 milliGRAM(s) Oral three times a day  heparin  Injectable 5000 Unit(s) SubCutaneous every 8 hours  memantine 5 milliGRAM(s) Oral two times a day  methylPREDNISolone sodium succinate Injectable 40 milliGRAM(s) IV Push every 8 hours  sodium chloride 0.45%. 1000 milliLiter(s) (75 mL/Hr) IV Continuous <Continuous>      MEDICATIONS  (PRN):  ALBUTerol    90 MICROgram(s) HFA Inhaler 2 Puff(s) Inhalation every 4 hours PRN Shortness of Breath and/or Wheezing      Allergies    No Known Allergies    Intolerances        PAST MEDICAL & SURGICAL HISTORY:  HLD (hyperlipidemia)  COPD (chronic obstructive pulmonary disease)  Dementia  No significant past surgical history      FAMILY HISTORY:  No pertinent family history in first degree relatives      SOCIAL HISTORY  Smoking History:     REVIEW OF SYSTEMS:    CONSTITUTIONAL:  As per HPI.    HEENT:  Eyes:  No diplopia or blurred vision. ENT:  No earache, sore throat or runny nose.    CARDIOVASCULAR:  No pressure, squeezing, tightness, heaviness or aching about the chest; no palpitations.    RESPIRATORY:  No cough, shortness of breath, PND or orthopnea. Mild SOBOE    GASTROINTESTINAL:  No nausea, vomiting or diarrhea.    GENITOURINARY:  No dysuria, frequency or urgency.    MUSCULOSKELETAL:  No joint pains    SKIN:  No new lesions.    NEUROLOGIC:  No paresthesias, fasciculations, seizures or weakness.    PSYCHIATRIC:  No disorder of thought or mood.    ENDOCRINE:  No heat or cold intolerance, polyuria or polydipsia.    HEMATOLOGICAL:  No easy bruising or bleeding.     Vital Signs Last 24 Hrs  T(C): 36.4 (29 Nov 2019 07:39), Max: 36.5 (28 Nov 2019 15:54)  T(F): 97.6 (29 Nov 2019 07:39), Max: 97.7 (28 Nov 2019 15:54)  HR: 69 (29 Nov 2019 08:08) (60 - 88)  BP: 114/58 (29 Nov 2019 07:39) (109/71 - 150/65)  BP(mean): 83 (29 Nov 2019 04:44) (83 - 98)  RR: 18 (29 Nov 2019 07:39) (14 - 21)  SpO2: 97% (29 Nov 2019 08:08) (95% - 100%)    PHYSICAL EXAMINATION:    GENERAL: The patient is a well-developed, well-nourished _elderly male, on BiPap, not verbally responsive____in no apparent distress.     HEENT: Head is normocephalic and atraumatic. Extraocular muscles are intact. Mucous membranes are moist.     NECK: Supple.     LUNGS: Clear to auscultation without wheezing, rales, or rhonchi. Respirations unlabored    HEART: Regular rate and rhythm without murmur.    ABDOMEN: Soft, nontender, and nondistended.  No hepatosplenomegaly is noted.    EXTREMITIES: Without any cyanosis, clubbing, rash, lesions or edema.    NEUROLOGIC: Grossly intact.    SKIN: No ulceration or induration present.      LABS:                        11.4   8.30  )-----------( 120      ( 29 Nov 2019 07:18 )             37.3     11-29    140  |  100  |  31.0<H>  ----------------------------<  96  5.3   |  28.0  |  0.73    Ca    9.9      29 Nov 2019 07:18    TPro  6.2<L>  /  Alb  3.5  /  TBili  0.3<L>  /  DBili  x   /  AST  29  /  ALT  21  /  AlkPhos  122<H>  11-27        ABG - ( 28 Nov 2019 15:01 )  pH, Arterial: 7.35  pH, Blood: x     /  pCO2: 60    /  pO2: 86    / HCO3: 30    / Base Excess: 5.9   /  SaO2: 97                      Serum Pro-Brain Natriuretic Peptide: 482 pg/mL (11-27-19 @ 16:49)          MICROBIOLOGY:    RADIOLOGY & ADDITIONAL STUDIES:< from: Xray Chest 1 View-PORTABLE IMMEDIATE (11.27.19 @ 16:38) >    INTERPRETATION:  Portable chest radiograph        CLINICAL INFORMATION:   Shortness of breath. Cough.    TECHNIQUE:  Portable  AP view of the chest wasobtained.    COMPARISON: 9/14/2019 and 6/20/2019 chest radiograph available for review.    FINDINGS:   The lungs  are clear gross airspace consolidations or effusions.   Hyperaerated upper lobes/hyperlucency may indicate of emphysematous lung   disease.    The heart and mediastinum are within normal limits.    Visualized osseous structures are intact.        IMPRESSION:   No evidence of active chest disease.   Hyperlucent upper   lobes which may indicate emphysematous lung disease.      < end of copied text >

## 2019-11-29 NOTE — PROGRESS NOTE ADULT - SUBJECTIVE AND OBJECTIVE BOX
Patient: KATH SCHULER [id number] 79y Male                           Internal Medicine Hospitalist Progress Note    Interval History:  Daughter at bedside.  Remains on BIPAP, lethargic.  Opens eyes to voice.  Not taking po per RN.   ____________________PHYSICAL EXAM:  Vitals reviewed as indicated below  GENERAL:  NAD lethargic.  Opens eyes to voice.    HEENT: NCAT  CARDIOVASCULAR:  S1, S2  LUNGS: coarse BS b/l  ABDOMEN:  soft, (-) tenderness, (-) distension, (+) bowel sounds, (-) guarding, (-) rebound (-) rigidity  EXTREMITIES:  no cyanosis / clubbing / edema.   NEURO: Moves 4 extremities to noxious stimuli  ____________________    VITALS:  Vital Signs Last 24 Hrs  T(C): 36.4 (29 Nov 2019 07:39), Max: 36.5 (28 Nov 2019 15:54)  T(F): 97.6 (29 Nov 2019 07:39), Max: 97.7 (28 Nov 2019 15:54)  HR: 69 (29 Nov 2019 08:08) (60 - 88)  BP: 114/58 (29 Nov 2019 07:39) (109/71 - 150/65)  BP(mean): 83 (29 Nov 2019 04:44) (83 - 98)  RR: 18 (29 Nov 2019 07:39) (14 - 21)  SpO2: 97% (29 Nov 2019 08:08) (95% - 100%) Daily     Daily   CAPILLARY BLOOD GLUCOSE      POCT Blood Glucose.: 92 mg/dL (29 Nov 2019 08:41)    I&O's Summary      LABS:                        11.4   8.30  )-----------( 120      ( 29 Nov 2019 07:18 )             37.3     11-29    140  |  100  |  31.0<H>  ----------------------------<  96  5.3   |  28.0  |  0.73    Ca    9.9      29 Nov 2019 07:18    TPro  6.2<L>  /  Alb  3.5  /  TBili  0.3<L>  /  DBili  x   /  AST  29  /  ALT  21  /  AlkPhos  122<H>  11-27      LIVER FUNCTIONS - ( 27 Nov 2019 16:49 )  Alb: 3.5 g/dL / Pro: 6.2 g/dL / ALK PHOS: 122 U/L / ALT: 21 U/L / AST: 29 U/L / GGT: x                     MEDICATIONS:  ALBUTerol    90 MICROgram(s) HFA Inhaler 2 Puff(s) Inhalation every 4 hours PRN  albuterol/ipratropium for Nebulization 3 milliLiter(s) Nebulizer every 6 hours  cephalexin 500 milliGRAM(s) Oral three times a day  heparin  Injectable 5000 Unit(s) SubCutaneous every 8 hours  memantine 5 milliGRAM(s) Oral two times a day  methylPREDNISolone sodium succinate Injectable 40 milliGRAM(s) IV Push every 8 hours

## 2019-11-30 LAB
ANION GAP SERPL CALC-SCNC: 14 MMOL/L — SIGNIFICANT CHANGE UP (ref 5–17)
BUN SERPL-MCNC: 43 MG/DL — HIGH (ref 8–20)
CALCIUM SERPL-MCNC: 9.9 MG/DL — SIGNIFICANT CHANGE UP (ref 8.6–10.2)
CHLORIDE SERPL-SCNC: 101 MMOL/L — SIGNIFICANT CHANGE UP (ref 98–107)
CO2 SERPL-SCNC: 26 MMOL/L — SIGNIFICANT CHANGE UP (ref 22–29)
CREAT SERPL-MCNC: 0.74 MG/DL — SIGNIFICANT CHANGE UP (ref 0.5–1.3)
GLUCOSE SERPL-MCNC: 72 MG/DL — SIGNIFICANT CHANGE UP (ref 70–115)
HCT VFR BLD CALC: 35.7 % — LOW (ref 39–50)
HGB BLD-MCNC: 11.3 G/DL — LOW (ref 13–17)
MCHC RBC-ENTMCNC: 30.8 PG — SIGNIFICANT CHANGE UP (ref 27–34)
MCHC RBC-ENTMCNC: 31.7 GM/DL — LOW (ref 32–36)
MCV RBC AUTO: 97.3 FL — SIGNIFICANT CHANGE UP (ref 80–100)
PLATELET # BLD AUTO: 142 K/UL — LOW (ref 150–400)
POTASSIUM SERPL-MCNC: 5.6 MMOL/L — HIGH (ref 3.5–5.3)
POTASSIUM SERPL-SCNC: 5.6 MMOL/L — HIGH (ref 3.5–5.3)
RBC # BLD: 3.67 M/UL — LOW (ref 4.2–5.8)
RBC # FLD: 16.3 % — HIGH (ref 10.3–14.5)
SODIUM SERPL-SCNC: 141 MMOL/L — SIGNIFICANT CHANGE UP (ref 135–145)
WBC # BLD: 8.74 K/UL — SIGNIFICANT CHANGE UP (ref 3.8–10.5)
WBC # FLD AUTO: 8.74 K/UL — SIGNIFICANT CHANGE UP (ref 3.8–10.5)

## 2019-11-30 PROCEDURE — 99233 SBSQ HOSP IP/OBS HIGH 50: CPT

## 2019-11-30 PROCEDURE — 99232 SBSQ HOSP IP/OBS MODERATE 35: CPT

## 2019-11-30 RX ORDER — SODIUM CHLORIDE 9 MG/ML
1000 INJECTION, SOLUTION INTRAVENOUS
Refills: 0 | Status: DISCONTINUED | OUTPATIENT
Start: 2019-11-30 | End: 2019-12-01

## 2019-11-30 RX ADMIN — Medication 3 MILLILITER(S): at 09:00

## 2019-11-30 RX ADMIN — HEPARIN SODIUM 5000 UNIT(S): 5000 INJECTION INTRAVENOUS; SUBCUTANEOUS at 23:14

## 2019-11-30 RX ADMIN — SODIUM CHLORIDE 75 MILLILITER(S): 9 INJECTION, SOLUTION INTRAVENOUS at 00:05

## 2019-11-30 RX ADMIN — Medication 40 MILLIGRAM(S): at 06:04

## 2019-11-30 RX ADMIN — Medication 3 MILLILITER(S): at 03:52

## 2019-11-30 RX ADMIN — HEPARIN SODIUM 5000 UNIT(S): 5000 INJECTION INTRAVENOUS; SUBCUTANEOUS at 06:04

## 2019-11-30 RX ADMIN — HEPARIN SODIUM 5000 UNIT(S): 5000 INJECTION INTRAVENOUS; SUBCUTANEOUS at 14:34

## 2019-11-30 RX ADMIN — Medication 40 MILLIGRAM(S): at 23:14

## 2019-11-30 RX ADMIN — Medication 3 MILLILITER(S): at 21:04

## 2019-11-30 RX ADMIN — MEMANTINE HYDROCHLORIDE 5 MILLIGRAM(S): 10 TABLET ORAL at 23:14

## 2019-11-30 RX ADMIN — Medication 3 MILLILITER(S): at 16:00

## 2019-11-30 RX ADMIN — Medication 40 MILLIGRAM(S): at 14:34

## 2019-11-30 NOTE — PROGRESS NOTE ADULT - SUBJECTIVE AND OBJECTIVE BOX
ICU Vital Signs Last 24 Hrs  T(C): 36.7 (30 Nov 2019 19:44), Max: 36.8 (30 Nov 2019 18:49)  T(F): 98.1 (30 Nov 2019 19:44), Max: 98.2 (30 Nov 2019 18:49)  HR: 89 (30 Nov 2019 19:44) (70 - 94)  BP: 124/84 (30 Nov 2019 19:44) (104/60 - 141/116)  BP(mean): 88 (30 Nov 2019 08:09) (74 - 125)  ABP: --  ABP(mean): --  RR: 17 (30 Nov 2019 19:44) (17 - 27)  SpO2: 100% (30 Nov 2019 19:44) (95% - 100%) HEALTH ISSUES - PROBLEM Dx:    Acute hypercapnic respiratory failure due to COPD exacerbation likely 2/2 RSV    INTERVAL HPI/ OVERNIGHT EVENTS:    comfortable off bipap. on NC O2  large family at bedside  conversations encouraged  wet cough noted    REVIEW OF SYSTEMS:    demented, wet cough, poor cough reflex, making gentle conversation    ICU Vital Signs Last 24 Hrs  T(C): 36.7 (30 Nov 2019 19:44), Max: 36.8 (30 Nov 2019 18:49)  T(F): 98.1 (30 Nov 2019 19:44), Max: 98.2 (30 Nov 2019 18:49)  HR: 89 (30 Nov 2019 19:44) (70 - 94)  BP: 124/84 (30 Nov 2019 19:44) (104/60 - 141/116)  BP(mean): 88 (30 Nov 2019 08:09) (74 - 125)  ABP: --  ABP(mean): --  RR: 17 (30 Nov 2019 19:44) (17 - 27)  SpO2: 100% (30 Nov 2019 19:44) (95% - 100%)    PHYSICAL EXAM-  GENERAL:  on NC O2, conversing, confused, no agitation, wet cough   HEENT: NCAT  CARDIOVASCULAR:  S1, S2  LUNGS: coarse BS b/l  ABDOMEN:  soft, (-) tenderness, (-) distension, (+) bowel sounds, (-) guarding, (-) rebound (-) rigidity  EXTREMITIES:  no cyanosis / clubbing / edema.   NEURO: Moves 4 extremities to noxious stimuli    MEDICATIONS  (STANDING):  albuterol/ipratropium for Nebulization 3 milliLiter(s) Nebulizer every 6 hours  ALPRAZolam 1 milliGRAM(s) Oral once  dextrose 5% + sodium chloride 0.9%. 1000 milliLiter(s) (70 mL/Hr) IV Continuous <Continuous>  heparin  Injectable 5000 Unit(s) SubCutaneous every 8 hours  memantine 5 milliGRAM(s) Oral two times a day  methylPREDNISolone sodium succinate Injectable 40 milliGRAM(s) IV Push every 8 hours    MEDICATIONS  (PRN):  ALBUTerol    90 MICROgram(s) HFA Inhaler 2 Puff(s) Inhalation every 4 hours PRN Shortness of Breath and/or Wheezing  ALPRAZolam 0.5 milliGRAM(s) Oral three times a day PRN agitation      LABS:                        11.3   8.74  )-----------( 142      ( 30 Nov 2019 06:05 )             35.7     11-30    141  |  101  |  43.0<H>  ----------------------------<  72  5.6<H>   |  26.0  |  0.74    Ca    9.9      30 Nov 2019 06:05

## 2019-11-30 NOTE — PROGRESS NOTE ADULT - ASSESSMENT
78 y/o male with PMH of dementia, COPD (not on home O2) was brought to the ED due to difficulty breathing, SaO2 noted pCO2 60s, admitted for acute hypoxic and hypercapneic respiratory failure.  RSV +.     Acute hypercapnic respiratory failure due to COPD exacerbation likely 2/2 RSV   Continue IV Steroids, nebulizers, supportive care.  Repeat ABG ordered.  He has been evaluated by critical care.  Pulmonary evaluation requested.  Per family full code.    COPD exacerbation Plan as above   RSV Tylenol PRN   Dementia with agitation - His mental status is at baseline per family including daughter at bedside.  Continue Memantine 5mg bid  Dysphagia - pt is too lethargic to take po intake.  Start on IVF.  I discussed concerns if pt continues to be unable to take po intake.  Daughter acknowledges understanding, states he would not have wanted NGT placement.  She will d/w family.    Supportive   DVT prophylaxis: heparin sc   Diet: regular   Code status: full code 78 y/o male with PMH of dementia, COPD (not on home O2) was brought to the ED due to difficulty breathing, SaO2 noted pCO2 60s, admitted for acute hypoxic and hypercapneic respiratory failure.  RSV +.     # Acute hypercapnic respiratory failure due to COPD exacerbation likely 2/2 RSV   weaned off Bipap  cont NC O2 as tolerated  symptomatic Rx for RSV  Mucinex added    # COPD exacerbation   Plan as above     # RSV   Tylenol PRN     # Dementia with agitation   His mental status is at baseline per family including daughter at bedside.  Continue Memantine 5mg bid  xanax added for anxiety    # Dysphagia ruled out  passed bedside screening  pureed diet started    # Supportive   DVT prophylaxis: heparin sc   Diet: regular   Code status: full code 78 y/o male with PMH of dementia, COPD (not on home O2) was brought to the ED due to difficulty breathing, SaO2 noted pCO2 60s, admitted for acute hypoxic and hypercapneic respiratory failure.  RSV +.     # Acute hypercapnic respiratory failure due to COPD exacerbation likely 2/2 RSV   weaned off Bipap  cont NC O2 as tolerated  symptomatic Rx for RSV  Mucinex added    # COPD exacerbation   Plan as above     # RSV   Tylenol PRN     # Dementia with agitation   His mental status is at baseline per family including daughter at bedside.  Continue Memantine 5mg bid    # Dysphagia ruled out  passed bedside screening  pureed diet started    # Supportive   DVT prophylaxis: heparin sc   Diet: regular   Code status: full code

## 2019-11-30 NOTE — PROGRESS NOTE ADULT - ASSESSMENT
78 yo male with baseline COPD on Spiriva and symbicort with recent RSV infection along with family \  Respir failure and worsened mental status altho he has dense dementia and sleeps most of day on regular basis    Definitely more alert today and wants to get BiPap off  Will try to change to nasal oxygen    Spoke with family

## 2019-11-30 NOTE — PROGRESS NOTE ADULT - SUBJECTIVE AND OBJECTIVE BOX
PULMONARY PROGRESS NOTE      RAJ SCHULER-[id number]    Patient is a 79y old  Male who presents with a chief complaint of COPD exacerbation (29 Nov 2019 11:42)      INTERVAL HPI/OVERNIGHT EVENTS:    MEDICATIONS  (STANDING):  albuterol/ipratropium for Nebulization 3 milliLiter(s) Nebulizer every 6 hours  cephalexin 500 milliGRAM(s) Oral three times a day  dextrose 5% + sodium chloride 0.9%. 1000 milliLiter(s) (70 mL/Hr) IV Continuous <Continuous>  heparin  Injectable 5000 Unit(s) SubCutaneous every 8 hours  memantine 5 milliGRAM(s) Oral two times a day  methylPREDNISolone sodium succinate Injectable 40 milliGRAM(s) IV Push every 8 hours      MEDICATIONS  (PRN):  ALBUTerol    90 MICROgram(s) HFA Inhaler 2 Puff(s) Inhalation every 4 hours PRN Shortness of Breath and/or Wheezing      Allergies    No Known Allergies    Intolerances        PAST MEDICAL & SURGICAL HISTORY:  HLD (hyperlipidemia)  COPD (chronic obstructive pulmonary disease)  Dementia  No significant past surgical history      SOCIAL HISTORY  Smoking History:       REVIEW OF SYSTEMS:    CONSTITUTIONAL:  No distress    HEENT:  Eyes:  No diplopia or blurred vision. ENT:  No earache, sore throat or runny nose.    CARDIOVASCULAR:  No pressure, squeezing, tightness, heaviness or aching about the chest; no palpitations.    RESPIRATORY:  No cough, shortness of breath, PND or orthopnea. Mild SOBOE    GASTROINTESTINAL:  No nausea, vomiting or diarrhea.    GENITOURINARY:  No dysuria, frequency or urgency.    MUSCULOSKELETAL:  No joint pain    SKIN:  No new lesions.    NEUROLOGIC:  No paresthesias, fasciculations, seizures or weakness.    PSYCHIATRIC:  No disorder of thought or mood.    ENDOCRINE:  No heat or cold intolerance, polyuria or polydipsia.    HEMATOLOGICAL:  No easy bruising or bleeding.     Vital Signs Last 24 Hrs  T(C): 36.4 (30 Nov 2019 07:28), Max: 36.6 (29 Nov 2019 16:01)  T(F): 97.6 (30 Nov 2019 07:28), Max: 97.9 (29 Nov 2019 16:01)  HR: 82 (30 Nov 2019 09:49) (70 - 116)  BP: 127/52 (30 Nov 2019 08:09) (104/60 - 141/116)  BP(mean): 88 (30 Nov 2019 08:09) (74 - 125)  RR: 19 (30 Nov 2019 08:09) (17 - 27)  SpO2: 97% (30 Nov 2019 09:49) (94% - 98%)    PHYSICAL EXAMINATION:    GENERAL: The patient is awake and alert in no apparent distress.     HEENT: Head is normocephalic and atraumatic. Extraocular muscles are intact. Mucous membranes are moist.    NECK: Supple.    LUNGS: Clear to auscultation without wheezing, rales or rhonchi; respirations unlabored    HEART: Regular rate and rhythm without murmur.    ABDOMEN: Soft, nontender, and nondistended.      EXTREMITIES: Without any cyanosis, clubbing, rash, lesions or edema.    NEUROLOGIC: Grossly intact.    SKIN: No ulceration or induration present.      LABS:                        11.3   8.74  )-----------( 142      ( 30 Nov 2019 06:05 )             35.7     11-30    141  |  101  |  43.0<H>  ----------------------------<  72  5.6<H>   |  26.0  |  0.74    Ca    9.9      30 Nov 2019 06:05          ABG - ( 29 Nov 2019 14:39 )  pH, Arterial: 7.37  pH, Blood: x     /  pCO2: 58    /  pO2: 90    / HCO3: 30    / Base Excess: 6.2   /  SaO2: 97                      Serum Pro-Brain Natriuretic Peptide: 482 pg/mL (11-27-19 @ 16:49)          MICROBIOLOGY:    RADIOLOGY & ADDITIONAL STUDIES:

## 2019-12-01 LAB — CORTIS AM PEAK SERPL-MCNC: 5.8 UG/DL — LOW (ref 6–18.4)

## 2019-12-01 PROCEDURE — 99232 SBSQ HOSP IP/OBS MODERATE 35: CPT

## 2019-12-01 RX ORDER — GUAIFENESIN/DEXTROMETHORPHAN 600MG-30MG
10 TABLET, EXTENDED RELEASE 12 HR ORAL EVERY 4 HOURS
Refills: 0 | Status: DISCONTINUED | OUTPATIENT
Start: 2019-12-01 | End: 2019-12-11

## 2019-12-01 RX ORDER — ALPRAZOLAM 0.25 MG
1 TABLET ORAL ONCE
Refills: 0 | Status: DISCONTINUED | OUTPATIENT
Start: 2019-12-01 | End: 2019-12-01

## 2019-12-01 RX ORDER — ALPRAZOLAM 0.25 MG
0.5 TABLET ORAL THREE TIMES A DAY
Refills: 0 | Status: DISCONTINUED | OUTPATIENT
Start: 2019-12-01 | End: 2019-12-05

## 2019-12-01 RX ADMIN — MEMANTINE HYDROCHLORIDE 5 MILLIGRAM(S): 10 TABLET ORAL at 05:53

## 2019-12-01 RX ADMIN — HEPARIN SODIUM 5000 UNIT(S): 5000 INJECTION INTRAVENOUS; SUBCUTANEOUS at 14:08

## 2019-12-01 RX ADMIN — Medication 40 MILLIGRAM(S): at 21:36

## 2019-12-01 RX ADMIN — Medication 3 MILLILITER(S): at 15:11

## 2019-12-01 RX ADMIN — SODIUM CHLORIDE 70 MILLILITER(S): 9 INJECTION, SOLUTION INTRAVENOUS at 00:36

## 2019-12-01 RX ADMIN — Medication 3 MILLILITER(S): at 09:26

## 2019-12-01 RX ADMIN — Medication 3 MILLILITER(S): at 20:38

## 2019-12-01 RX ADMIN — SODIUM CHLORIDE 70 MILLILITER(S): 9 INJECTION, SOLUTION INTRAVENOUS at 21:36

## 2019-12-01 RX ADMIN — Medication 40 MILLIGRAM(S): at 05:53

## 2019-12-01 RX ADMIN — HEPARIN SODIUM 5000 UNIT(S): 5000 INJECTION INTRAVENOUS; SUBCUTANEOUS at 05:53

## 2019-12-01 RX ADMIN — HEPARIN SODIUM 5000 UNIT(S): 5000 INJECTION INTRAVENOUS; SUBCUTANEOUS at 21:36

## 2019-12-01 RX ADMIN — Medication 1 MILLIGRAM(S): at 09:48

## 2019-12-01 RX ADMIN — Medication 40 MILLIGRAM(S): at 14:08

## 2019-12-01 RX ADMIN — Medication 3 MILLILITER(S): at 01:18

## 2019-12-01 NOTE — PROGRESS NOTE ADULT - ASSESSMENT
80 yo male with dementia  Much better today OFF BIpap  Mental status appears to have reached baseline    Recent RSV infection    Agree with conservative, supportive approach

## 2019-12-01 NOTE — PROGRESS NOTE ADULT - SUBJECTIVE AND OBJECTIVE BOX
HEALTH ISSUES - PROBLEM Dx:    Acute hypercapnic respiratory failure due to COPD exacerbation likely 2/2 RSV    INTERVAL HPI/ OVERNIGHT EVENTS:    comfortable off bipap. on NC O2  wet cough noted  confusion noted     REVIEW OF SYSTEMS:    demented, wet cough, poor cough reflex,     Vital Signs Last 24 Hrs  T(C): 36.5 (01 Dec 2019 08:38), Max: 36.8 (30 Nov 2019 18:49)  T(F): 97.7 (01 Dec 2019 08:38), Max: 98.3 (30 Nov 2019 21:09)  HR: 74 (01 Dec 2019 09:28) (74 - 89)  BP: 120/79 (01 Dec 2019 08:10) (103/67 - 166/80)  BP(mean): 91 (01 Dec 2019 08:10) (91 - 91)  RR: 15 (01 Dec 2019 08:10) (12 - 20)  SpO2: 100% (01 Dec 2019 09:28) (95% - 100%)    PHYSICAL EXAM-  GENERAL:  on NC O2, conversing, confused, no agitation, wet cough   HEENT: NCAT  CARDIOVASCULAR:  S1, S2  LUNGS: coarse BS b/l  ABDOMEN:  soft, (-) tenderness, (-) distension, (+) bowel sounds, (-) guarding, (-) rebound (-) rigidity  EXTREMITIES:  no cyanosis / clubbing / edema.   NEURO: Moves 4 extremities to noxious stimuli    MEDICATIONS  (STANDING):  albuterol/ipratropium for Nebulization 3 milliLiter(s) Nebulizer every 6 hours  dextrose 5% + sodium chloride 0.9%. 1000 milliLiter(s) (70 mL/Hr) IV Continuous <Continuous>  guaifenesin/dextromethorphan  Syrup 10 milliLiter(s) Oral every 4 hours  heparin  Injectable 5000 Unit(s) SubCutaneous every 8 hours  memantine 5 milliGRAM(s) Oral two times a day  methylPREDNISolone sodium succinate Injectable 40 milliGRAM(s) IV Push every 8 hours    MEDICATIONS  (PRN):  ALBUTerol    90 MICROgram(s) HFA Inhaler 2 Puff(s) Inhalation every 4 hours PRN Shortness of Breath and/or Wheezing  ALPRAZolam 0.5 milliGRAM(s) Oral three times a day PRN agitation      LABS:                        11.3   8.74  )-----------( 142      ( 30 Nov 2019 06:05 )             35.7     11-30    141  |  101  |  43.0<H>  ----------------------------<  72  5.6<H>   |  26.0  |  0.74    Ca    9.9      30 Nov 2019 06:05

## 2019-12-01 NOTE — PROGRESS NOTE ADULT - ASSESSMENT
80 y/o male with PMH of dementia, COPD (not on home O2) was brought to the ED due to difficulty breathing, SaO2 noted pCO2 60s, admitted for acute hypoxic and hypercapneic respiratory failure. RSV +.     # Acute hypercapnic respiratory failure due to COPD exacerbation likely 2/2 RSV   weaned off Bipap  cont NC O2 as tolerated  symptomatic Rx for RSV  Mucinex added    # COPD exacerbation   Plan as above     # RSV   Tylenol PRN     # Dementia with agitation   His mental status is at baseline per family including daughter at bedside.  Continue Memantine 5mg bid  xanax added for anxiety    # Dysphagia ruled out  passed bedside screening  pureed diet started    # Supportive   DVT prophylaxis: heparin sc   Diet: regular   Code status: full code

## 2019-12-01 NOTE — PROGRESS NOTE ADULT - SUBJECTIVE AND OBJECTIVE BOX
PULMONARY PROGRESS NOTE      RAJ SCHULER-[id number]    Patient is a 79y old  Male who presents with a chief complaint of COPD exacerbation (01 Dec 2019 11:16)      INTERVAL HPI/OVERNIGHT EVENTS:    MEDICATIONS  (STANDING):  albuterol/ipratropium for Nebulization 3 milliLiter(s) Nebulizer every 6 hours  dextrose 5% + sodium chloride 0.9%. 1000 milliLiter(s) (70 mL/Hr) IV Continuous <Continuous>  guaifenesin/dextromethorphan  Syrup 10 milliLiter(s) Oral every 4 hours  heparin  Injectable 5000 Unit(s) SubCutaneous every 8 hours  memantine 5 milliGRAM(s) Oral two times a day  methylPREDNISolone sodium succinate Injectable 40 milliGRAM(s) IV Push every 8 hours      MEDICATIONS  (PRN):  ALBUTerol    90 MICROgram(s) HFA Inhaler 2 Puff(s) Inhalation every 4 hours PRN Shortness of Breath and/or Wheezing  ALPRAZolam 0.5 milliGRAM(s) Oral three times a day PRN agitation      Allergies    No Known Allergies    Intolerances        PAST MEDICAL & SURGICAL HISTORY:  HLD (hyperlipidemia)  COPD (chronic obstructive pulmonary disease)  Dementia  No significant past surgical history      SOCIAL HISTORY  Smoking History:       REVIEW OF SYSTEMS:    CONSTITUTIONAL:  No distress    HEENT:  Eyes:  No diplopia or blurred vision. ENT:  No earache, sore throat or runny nose.    CARDIOVASCULAR:  No pressure, squeezing, tightness, heaviness or aching about the chest; no palpitations.    RESPIRATORY:  No cough, shortness of breath, PND or orthopnea. Mild SOBOE    GASTROINTESTINAL:  No nausea, vomiting or diarrhea.    GENITOURINARY:  No dysuria, frequency or urgency.    MUSCULOSKELETAL:  No joint pain    SKIN:  No new lesions.    NEUROLOGIC:  No paresthesias, fasciculations, seizures or weakness.    PSYCHIATRIC:  No disorder of thought or mood.    ENDOCRINE:  No heat or cold intolerance, polyuria or polydipsia.    HEMATOLOGICAL:  No easy bruising or bleeding.     Vital Signs Last 24 Hrs  T(C): 36.5 (01 Dec 2019 08:38), Max: 36.8 (30 Nov 2019 18:49)  T(F): 97.7 (01 Dec 2019 08:38), Max: 98.3 (30 Nov 2019 21:09)  HR: 55 (01 Dec 2019 15:12) (55 - 89)  BP: 113/102 (01 Dec 2019 12:00) (103/67 - 166/80)  BP(mean): 108 (01 Dec 2019 12:00) (91 - 108)  RR: 16 (01 Dec 2019 12:00) (12 - 20)  SpO2: 96% (01 Dec 2019 15:12) (95% - 100%)    PHYSICAL EXAMINATION:    GENERAL: The patient is awake and alert in no apparent distress.     HEENT: Head is normocephalic and atraumatic. Extraocular muscles are intact. Mucous membranes are moist.    NECK: Supple.    LUNGS: Clear to auscultation without wheezing, rales or rhonchi; respirations unlabored    HEART: Regular rate and rhythm without murmur.    ABDOMEN: Soft, nontender, and nondistended.      EXTREMITIES: Without any cyanosis, clubbing, rash, lesions or edema.    NEUROLOGIC: Grossly intact.    SKIN: No ulceration or induration present.      LABS:                        11.3   8.74  )-----------( 142      ( 30 Nov 2019 06:05 )             35.7     11-30    141  |  101  |  43.0<H>  ----------------------------<  72  5.6<H>   |  26.0  |  0.74    Ca    9.9      30 Nov 2019 06:05                          MICROBIOLOGY:    RADIOLOGY & ADDITIONAL STUDIES:< from: Xray Chest 1 View-PORTABLE IMMEDIATE (11.27.19 @ 16:38) >    INTERPRETATION:  Portable chest radiograph        CLINICAL INFORMATION:   Shortness of breath. Cough.    TECHNIQUE:  Portable  AP view of the chest wasobtained.    COMPARISON: 9/14/2019 and 6/20/2019 chest radiograph available for review.    FINDINGS:   The lungs  are clear gross airspace consolidations or effusions.   Hyperaerated upper lobes/hyperlucency may indicate of emphysematous lung   disease.    The heart and mediastinum are within normal limits.    Visualized osseous structures are intact.        IMPRESSION:   No evidence of active chest disease.   Hyperlucent upper   lobes which may indicate emphysematous lung disease.      < end of copied text >

## 2019-12-02 PROCEDURE — 99233 SBSQ HOSP IP/OBS HIGH 50: CPT

## 2019-12-02 RX ORDER — FUROSEMIDE 40 MG
40 TABLET ORAL ONCE
Refills: 0 | Status: COMPLETED | OUTPATIENT
Start: 2019-12-02 | End: 2019-12-02

## 2019-12-02 RX ADMIN — Medication 10 MILLILITER(S): at 17:54

## 2019-12-02 RX ADMIN — Medication 10 MILLILITER(S): at 05:54

## 2019-12-02 RX ADMIN — Medication 3 MILLILITER(S): at 14:44

## 2019-12-02 RX ADMIN — Medication 20 MILLIGRAM(S): at 17:55

## 2019-12-02 RX ADMIN — Medication 40 MILLIGRAM(S): at 08:45

## 2019-12-02 RX ADMIN — Medication 3 MILLILITER(S): at 02:32

## 2019-12-02 RX ADMIN — Medication 40 MILLIGRAM(S): at 05:37

## 2019-12-02 RX ADMIN — Medication 3 MILLILITER(S): at 07:38

## 2019-12-02 RX ADMIN — Medication 10 MILLILITER(S): at 13:49

## 2019-12-02 RX ADMIN — MEMANTINE HYDROCHLORIDE 5 MILLIGRAM(S): 10 TABLET ORAL at 17:54

## 2019-12-02 RX ADMIN — HEPARIN SODIUM 5000 UNIT(S): 5000 INJECTION INTRAVENOUS; SUBCUTANEOUS at 21:23

## 2019-12-02 RX ADMIN — Medication 10 MILLILITER(S): at 21:23

## 2019-12-02 RX ADMIN — Medication 10 MILLILITER(S): at 09:13

## 2019-12-02 RX ADMIN — MEMANTINE HYDROCHLORIDE 5 MILLIGRAM(S): 10 TABLET ORAL at 08:05

## 2019-12-02 RX ADMIN — Medication 3 MILLILITER(S): at 20:15

## 2019-12-02 RX ADMIN — HEPARIN SODIUM 5000 UNIT(S): 5000 INJECTION INTRAVENOUS; SUBCUTANEOUS at 05:37

## 2019-12-02 RX ADMIN — HEPARIN SODIUM 5000 UNIT(S): 5000 INJECTION INTRAVENOUS; SUBCUTANEOUS at 13:48

## 2019-12-02 NOTE — PROGRESS NOTE ADULT - SUBJECTIVE AND OBJECTIVE BOX
HEALTH ISSUES - PROBLEM Dx:    Acute hypercapnic respiratory failure due to COPD exacerbation likely 2/2 RSV    INTERVAL HPI/ OVERNIGHT EVENTS: No overnight events. Wore bipap mask over night     Seen and examined at bedside  A&O x 1, demented, pleasantly confused   On 2-3L NC o2  No specific complaints  1:1 at bedside when family not present    Vital Signs Last 24 Hrs  T(C): 36.8 (02 Dec 2019 05:09), Max: 37 (01 Dec 2019 17:35)  T(F): 98.2 (02 Dec 2019 05:09), Max: 98.6 (01 Dec 2019 17:35)  HR: 67 (02 Dec 2019 08:08) (51 - 82)  BP: 152/86 (02 Dec 2019 08:00) (113/102 - 169/91)  BP(mean): 115 (01 Dec 2019 16:00) (108 - 115)  RR: 20 (02 Dec 2019 08:00) (15 - 20)  SpO2: 95% (02 Dec 2019 08:00) (95% - 100%) on 2L NC    PHYSICAL EXAM-  GENERAL:  on NC O2, conversing, confused, no agitation   HEENT: NCAT  CARDIOVASCULAR:  S1, S2  LUNGS: course breath sounds, +expiratory wheeze b/l   ABDOMEN:  soft, non tender, non distension, + bowel sounds, no gurading  EXTREMITIES: no cyanosis / clubbing / edema.   NEURO: Moves 4 extremities to noxious stimuli    LABS: Reviewed HEALTH ISSUES - PROBLEM Dx:    Acute hypercapnic respiratory failure due to COPD exacerbation likely 2/2 RSV    INTERVAL HPI/ OVERNIGHT EVENTS: No overnight events. Wore bipap mask over night     Seen and examined at bedside  A&O x 1, demented, pleasantly confused   On 2-3L NC o2  No specific complaints  1:1 at bedside when family not present    Vital Signs Last 24 Hrs  T(C): 36.8 (02 Dec 2019 05:09), Max: 37 (01 Dec 2019 17:35)  T(F): 98.2 (02 Dec 2019 05:09), Max: 98.6 (01 Dec 2019 17:35)  HR: 67 (02 Dec 2019 08:08) (51 - 82)  BP: 152/86 (02 Dec 2019 08:00) (113/102 - 169/91)  BP(mean): 115 (01 Dec 2019 16:00) (108 - 115)  RR: 20 (02 Dec 2019 08:00) (15 - 20)  SpO2: 95% (02 Dec 2019 08:00) (95% - 100%) on 2L NC    PHYSICAL EXAM-  GENERAL:  on NC O2, conversing, confused, no agitation   HEENT: NCAT  CARDIOVASCULAR:  S1, S2  LUNGS: improved breath sounds, occasional expiratory wheeze b/l   ABDOMEN:  soft, non tender, non distension, + bowel sounds, no gurading  EXTREMITIES: no cyanosis / clubbing / edema.   NEURO: Moves 4 extremities to noxious stimuli    LABS: Reviewed HEALTH ISSUES - PROBLEM Dx:    Acute hypercapnic respiratory failure due to COPD exacerbation likely 2/2 RSV    INTERVAL HPI/ OVERNIGHT EVENTS: No overnight events. Wore bipap mask over night     Seen and examined at bedside  A&O x 1, demented, pleasantly confused   On 2-3L NC o2  No specific complaints  1:1 at bedside when family not present, occasionally agressive/pulls at lines    Vital Signs Last 24 Hrs  T(C): 36.8 (02 Dec 2019 05:09), Max: 37 (01 Dec 2019 17:35)  T(F): 98.2 (02 Dec 2019 05:09), Max: 98.6 (01 Dec 2019 17:35)  HR: 67 (02 Dec 2019 08:08) (51 - 82)  BP: 152/86 (02 Dec 2019 08:00) (113/102 - 169/91)  BP(mean): 115 (01 Dec 2019 16:00) (108 - 115)  RR: 20 (02 Dec 2019 08:00) (15 - 20)  SpO2: 95% (02 Dec 2019 08:00) (95% - 100%) on 2L NC    PHYSICAL EXAM-  GENERAL:  on NC O2, conversing, confused, no agitation   HEENT: NCAT  CARDIOVASCULAR:  S1, S2  LUNGS: improved breath sounds, occasional expiratory wheeze b/l   ABDOMEN:  soft, non tender, non distension, + bowel sounds, no gurading  EXTREMITIES: no cyanosis / clubbing / edema.   NEURO: Moves 4 extremities to noxious stimuli    LABS: Reviewed

## 2019-12-02 NOTE — CONSULT NOTE ADULT - SUBJECTIVE AND OBJECTIVE BOX
HPI:  80 y/o male with PMH of dementia, COPD (not on home O2) was brought to the ED due to difficulty breathing. HPI as per son at bed side because patient has dementia; poor historian. As per son, patient has been having difficulty breathing, wheezing for about 3-5 days now but his symptoms worsen today, his saturation was in the 60s. Patient has many sick contact, his wife was just recovering from flu-like symptoms. No fever, chills, chest pain, nausea, vomiting, abdominal pain, change in bowel/urinary habit, recent travel reported. (27 Nov 2019 21:33)  Pt. noted to be hypothermic, and as result a cortisol was ordered, and found to be around 5. Pt. however on solu-medrol 20 mg IV q12h      PAST MEDICAL & SURGICAL HISTORY:  HLD (hyperlipidemia)  COPD (chronic obstructive pulmonary disease)  Dementia  No significant past surgical history      FAMILY HISTORY:  No pertinent family history in first degree relatives      SOCIAL HISTORY:    REVIEW OF SYSTEMS:  not obtainable from pt. due to dementia    MEDICATIONS  (STANDING):  albuterol/ipratropium for Nebulization 3 milliLiter(s) Nebulizer every 6 hours  guaifenesin/dextromethorphan  Syrup 10 milliLiter(s) Oral every 4 hours  heparin  Injectable 5000 Unit(s) SubCutaneous every 8 hours  memantine 5 milliGRAM(s) Oral two times a day  methylPREDNISolone sodium succinate Injectable 20 milliGRAM(s) IV Push every 12 hours    MEDICATIONS  (PRN):  ALBUTerol    90 MICROgram(s) HFA Inhaler 2 Puff(s) Inhalation every 4 hours PRN Shortness of Breath and/or Wheezing  ALPRAZolam 0.5 milliGRAM(s) Oral three times a day PRN agitation      Allergies    No Known Allergies    Intolerances          PHYSICAL EXAM:    Vital Signs Last 24 Hrs  T(C): 36.4 (02 Dec 2019 15:37), Max: 37 (01 Dec 2019 17:35)  T(F): 97.6 (02 Dec 2019 15:37), Max: 98.6 (01 Dec 2019 17:35)  HR: 80 (02 Dec 2019 14:46) (51 - 80)  BP: 152/86 (02 Dec 2019 08:00) (129/104 - 169/91)  BP(mean): 115 (01 Dec 2019 16:00) (115 - 115)  RR: 20 (02 Dec 2019 08:00) (15 - 20)  SpO2: 99% (02 Dec 2019 14:46) (95% - 100%)    General appearance: chronically ill    Eyes: Pupils equal. No exophthalmos.    Neck: Trachea midline. No thyroid enlargement.    Lungs: Normal respiratory excursion. Decreased breath sounds    CV: Regular cardiac rhythm.     Abdomen: Soft, non tender, no organomegaly or mass.    Musculoskeletal: No cyanosis, clubbing, or edema. No pedal lesions.    Skin: Warm dry    Psych: not verbally responsive      glucose normal  normal thyroid function

## 2019-12-02 NOTE — PROGRESS NOTE ADULT - ASSESSMENT
78 y/o male with PMH of dementia, COPD (not on home O2) was brought to the ED due to difficulty breathing, SaO2 noted pCO2 60s, admitted for acute hypoxic and hypercapnic respiratory failure requiring bipap support. RSV +.     1. Acute hypercapnic respiratory failure due to COPD exacerbation likely 2/2 RSV   Bipap PRN/QHS  Tolerating NC o2, wean  Will taper IV steroids to Q12 today   Symptomatic Rx for RSV  Mucinex added    2. COPD exacerbation   Plan as above     3. RSV   Tylenol PRN     4. Dementia with agitation   His mental status is at baseline per family   Continue Memantine 5mg bid  xanax added for anxiety    5. Dysphagia ruled out  Passed bedside screening  Pureed diet started    # Supportive   DVT prophylaxis: heparin sc   Diet: regular   Code status: full code 80 y/o male with PMH of dementia, COPD (not on home O2) was brought to the ED due to difficulty breathing, SaO2 noted pCO2 60s, admitted for acute hypoxic and hypercapnic respiratory failure requiring bipap support. RSV +.     1. Acute hypercapnic respiratory failure due to COPD exacerbation likely 2/2 RSV   Bipap PRN/QHS  Tolerating NC o2, wean  Will taper IV steroids to Q12 today   Symptomatic Rx for RSV  Mucinex added  Pulm consult appreciated     2. COPD exacerbation   Plan as above     3. RSV   Tylenol PRN     4. Dementia with agitation   His mental status is at baseline per family   Continue Memantine 5mg bid  xanax added for anxiety    5. Dysphagia ruled out  Passed bedside screening  Pureed diet started    # Supportive   DVT prophylaxis: heparin sc   Diet: regular   Code status: full code 80 y/o male with PMH of dementia, COPD (not on home O2) was brought to the ED due to difficulty breathing, SaO2 noted pCO2 60s, admitted for acute hypoxic and hypercapnic respiratory failure requiring bipap support. RSV +.     1. Acute hypercapnic respiratory failure due to COPD exacerbation likely 2/2 RSV   Bipap PRN/QHS  Tolerating NC o2, wean  Will taper IV steroids to Q12 today   Symptomatic Rx for RSV  Mucinex added  Pulm consult appreciated     2. COPD exacerbation   Plan as above     3. RSV   Tylenol PRN     4. Hypothermia   Low body temp occasional requiring bear hugger  Does not appear septic, non toxic   Unlikely endocrinopathy, thyroid labs wnl, am cortisol acceptable  Observe      5. Dementia with agitation   His mental status is at baseline per family   Continue Memantine 5mg bid  xanax added for anxiety    6. Dysphagia ruled out  Passed bedside screening  Pureed diet started    # Supportive   DVT prophylaxis: heparin sc   Diet: regular   Code status: full code 78 y/o male with PMH of dementia, COPD (not on home O2) was brought to the ED due to difficulty breathing, SaO2 noted pCO2 60s, admitted for acute hypoxic and hypercapnic respiratory failure requiring bipap support. RSV +.     1. Acute hypercapnic respiratory failure due to COPD exacerbation likely 2/2 RSV   Bipap PRN/QHS  Tolerating NC o2, wean  Will taper IV steroids to Q12 today   Symptomatic Rx for RSV  Mucinex added  Pulm consult appreciated   ABG 11/30 reviewed, compensated, chronic retainer    2. COPD exacerbation   Plan as above     3. RSV   Tylenol PRN     4. Hypothermia   Low body temp occasional requiring bear hugger  Does not appear septic, non toxic   Unlikely endocrinopathy, thyroid labs wnl, am cortisol acceptable  Observe      5. Dementia with agitation   His mental status is at baseline per family   Continue Memantine 5mg bid  xanax added for anxiety    6. Dysphagia ruled out  Passed bedside screening  Pureed diet started    # Supportive   DVT prophylaxis: heparin sc   Diet: regular   Code status: full code 78 y/o male with PMH of dementia, COPD (not on home O2) was brought to the ED due to difficulty breathing, SaO2 noted pCO2 60s, admitted for acute hypoxic and hypercapnic respiratory failure requiring bipap support. RSV +.     1. Acute hypercapnic respiratory failure due to COPD exacerbation likely 2/2 RSV   Bipap PRN/QHS  Tolerating NC o2, wean  Will taper IV steroids to Q12 today   Symptomatic Rx for RSV  Mucinex added  Pulm consult appreciated   ABG 11/30 reviewed, compensated, chronic retainer    2. COPD exacerbation   Plan as above     3. RSV   Tylenol PRN     4. Hypothermia   Low body temp occasional requiring bear hugger  Does not appear septic, non toxic   ? Endocrinopathy, thyroid labs wnl, am cortisol 5.8 however also on steroids   Will consult endocrine     5. Dementia with agitation   His mental status is at baseline per family   Continue Memantine 5mg bid  xanax added for anxiety    6. Dysphagia ruled out  Passed bedside screening  Pureed diet started    # Supportive   DVT prophylaxis: heparin sc   Diet: regular   Code status: full code 80 y/o male with PMH of dementia, COPD (not on home O2) was brought to the ED due to difficulty breathing, SaO2 noted pCO2 60s, admitted for acute hypoxic and hypercapnic respiratory failure requiring bipap support. RSV +.     1. Acute hypercapnic respiratory failure due to COPD exacerbation likely 2/2 RSV   Bipap PRN/QHS  Tolerating NC o2, wean  Will taper IV steroids to Q12 today   Symptomatic Rx for RSV  Mucinex added  Pulm consult appreciated   ABG 11/30 reviewed, compensated, chronic retainer    2. COPD exacerbation   Plan as above     3. RSV   Tylenol PRN     4. Hypothermia   Low body temp occasionally requiring bear hugger  Does not appear septic, non toxic, unlikely infectious related  ? Endocrinopathy, thyroid labs wnl, am cortisol 5.8 however also on steroids   Will consult endocrine     5. Dementia with agitation   His mental status is at baseline per family   Continue Memantine 5mg bid  xanax added for anxiety    6. Dysphagia ruled out  Passed bedside screening  Pureed diet started    # Supportive   DVT prophylaxis: heparin sc   Diet: regular   Code status: full code 78 y/o male with PMH of dementia, COPD (not on home O2) was brought to the ED due to difficulty breathing, SaO2 noted pCO2 60s, admitted for acute hypoxic and hypercapnic respiratory failure requiring bipap support. RSV +.     1. Acute hypercapnic respiratory failure due to COPD exacerbation likely 2/2 RSV   Bipap PRN/QHS  Tolerating NC o2, wean  Will taper IV steroids to Q12 today 12/2/19  Symptomatic Rx for RSV  Mucinex added  Pulm consult appreciated   ABG 11/30 reviewed, compensated, chronic retainer    2. COPD exacerbation   Plan as above     3. RSV   Tylenol PRN     4. Hypothermia   Low body temp occasionally requiring bear hugger  Does not appear septic, non toxic, unlikely infectious related  ? Endocrinopathy, thyroid labs wnl, am cortisol 5.8 however also on steroids   Will consult endocrine     5. Dementia with agitation   His mental status is at baseline per family   Continue Memantine 5mg bid  xanax added for anxiety    6. Dysphagia ruled out  Passed bedside screening  Pureed diet started    # Supportive   DVT prophylaxis: heparin sc   Diet: regular   Code status: full code

## 2019-12-02 NOTE — PROGRESS NOTE ADULT - SUBJECTIVE AND OBJECTIVE BOX
PULMONARY PROGRESS NOTE      RAJ SCHULER-[id number]    Patient is a 79y old  Male who presents with a chief complaint of COPD exacerbation (02 Dec 2019 09:22)      INTERVAL HPI/OVERNIGHT EVENTS:  Asleep  but arousable  no distress  family at bedside      MEDICATIONS  (STANDING):  albuterol/ipratropium for Nebulization 3 milliLiter(s) Nebulizer every 6 hours  guaifenesin/dextromethorphan  Syrup 10 milliLiter(s) Oral every 4 hours  heparin  Injectable 5000 Unit(s) SubCutaneous every 8 hours  memantine 5 milliGRAM(s) Oral two times a day  methylPREDNISolone sodium succinate Injectable 40 milliGRAM(s) IV Push two times a day      MEDICATIONS  (PRN):  ALBUTerol    90 MICROgram(s) HFA Inhaler 2 Puff(s) Inhalation every 4 hours PRN Shortness of Breath and/or Wheezing  ALPRAZolam 0.5 milliGRAM(s) Oral three times a day PRN agitation      Allergies    No Known Allergies    Intolerances        PAST MEDICAL & SURGICAL HISTORY:  HLD (hyperlipidemia)  COPD (chronic obstructive pulmonary disease)  Dementia  No significant past surgical history      SOCIAL HISTORY  Smoking History:       REVIEW OF SYSTEMS:    unavailable        Vital Signs Last 24 Hrs  T(C): 36.8 (02 Dec 2019 05:09), Max: 37 (01 Dec 2019 17:35)  T(F): 98.2 (02 Dec 2019 05:09), Max: 98.6 (01 Dec 2019 17:35)  HR: 67 (02 Dec 2019 08:08) (51 - 79)  BP: 152/86 (02 Dec 2019 08:00) (113/102 - 169/91)  BP(mean): 115 (01 Dec 2019 16:00) (108 - 115)  RR: 20 (02 Dec 2019 08:00) (15 - 20)  SpO2: 95% (02 Dec 2019 08:00) (95% - 100%)    PHYSICAL EXAMINATION:    GENERAL: The patient is poorly responsive    HEENT: Head is normocephalic and atraumatic. Extraocular muscles are intact. Mucous membranes are moist.    NECK: Supple.    LUNGS: Clear to auscultation without wheezing, rales or rhonchi; respirations unlabored    HEART: Regular rate and rhythm without murmur.    ABDOMEN: Soft, nontender, and nondistended.      EXTREMITIES: Without any cyanosis, clubbing, rash, lesions or edema.    NEUROLOGIC: Grossly intact.    LABS:      Basic Metabolic Panel in AM (11.30.19 @ 06:05)    Sodium, Serum: 141 mmol/L    Potassium, Serum: 5.6: Mild hemolysis.  Results may be falsely elevated. mmol/L    Chloride, Serum: 101 mmol/L    Carbon Dioxide, Serum: 26.0 mmol/L    Anion Gap, Serum: 14 mmol/L    Blood Urea Nitrogen, Serum: 43.0 mg/dL    Creatinine, Serum: 0.74 mg/dL    Glucose, Serum: 72 mg/dL    Calcium, Total Serum: 9.9 mg/dL    eGFR if Non : 88: Interpretative comment  The units for eGFR are mL/min/1.73M2 (normalized body surface area). The  eGFR is calculated from a serum creatinine using the CKD-EPI equation.  Other variables required for calculation are race, age and sex. Among  patients with chronic kidney disease (CKD), the eGFR is useful in  determining the stage of disease according to KDOQI CKD classification.  All eGFR results are reported numerically with the following  interpretation.          GFR                    With                 Without     (ml/min/1.73 m2)    Kidney Damage       Kidney Damage        >= 90                    Stage 1                     Normal        60-89                    Stage 2                     Decreased GFR        30-59     Stage 3                     Stage 3        15-29                    Stage 4                     Stage 4        < 15                      Stage 5                     Stage 5  Each stage of CKD assumes that the associated GFR level has been in  effect for at least 3 months. Determination of stages one and two (with  eGFR > 59 ml/min/m2) requires estimation of kidney damage for at least 3  months as defined by structural or functional abnormalities.  Limitations: All estimates of GFR will be less accurate for patients at  extremes of muscle mass (including but not limited to frail elderly,  critically ill, or cancer patients), those with unusual diets, and those  with conditions associated with reduced secretion or extrarenal  elimination of creatinine. The eGFR equation is not recommended for use  in patients with unstable creatinine levels. mL/min/1.73M2    eGFR if African American: 102 mL/min/1.73M2        Blood Gas Profile - Arterial (11.29.19 @ 14:39)    pH, Arterial: 7.37    pCO2, Arterial: 58 mmHg    pO2, Arterial: 90 mmHg    HCO3, Arterial: 30 mmoL/L    Base Excess, Arterial: 6.2 mmol/L    Oxygen Saturation, Arterial: 97 %    FIO2, Arterial: 30    Blood Gas Comments Arterial: st 12/6 30%    Blood Gas Source Arterial: Arterial                        MICROBIOLOGY:    RADIOLOGY & ADDITIONAL STUDIES:  < from: Xray Chest 1 View-PORTABLE IMMEDIATE (11.27.19 @ 16:38) >   EXAM:  XR CHEST PORTABLE IMMED 1V                          PROCEDURE DATE:  11/27/2019          INTERPRETATION:  Portable chest radiograph        CLINICAL INFORMATION:   Shortness of breath. Cough.    TECHNIQUE:  Portable  AP view of the chest wasobtained.    COMPARISON: 9/14/2019 and 6/20/2019 chest radiograph available for review.    FINDINGS:   The lungs  are clear gross airspace consolidations or effusions.   Hyperaerated upper lobes/hyperlucency may indicate of emphysematous lung   disease.    The heart and mediastinum are within normal limits.    Visualized osseous structures are intact.        IMPRESSION:   No evidence of active chest disease.   Hyperlucent upper   lobes which may indicate emphysematous lung disease.                    ANTONETTE ALEJO M.D., ATTENDING RADIOLOGIST  This document has been electronically signed. Nov 27 2019  4:40PM    < end of copied text >  All films reviewed on PACS

## 2019-12-03 LAB
ANION GAP SERPL CALC-SCNC: 10 MMOL/L — SIGNIFICANT CHANGE UP (ref 5–17)
BUN SERPL-MCNC: 24 MG/DL — HIGH (ref 8–20)
CALCIUM SERPL-MCNC: 9.5 MG/DL — SIGNIFICANT CHANGE UP (ref 8.6–10.2)
CHLORIDE SERPL-SCNC: 106 MMOL/L — SIGNIFICANT CHANGE UP (ref 98–107)
CO2 SERPL-SCNC: 34 MMOL/L — HIGH (ref 22–29)
CREAT SERPL-MCNC: 0.63 MG/DL — SIGNIFICANT CHANGE UP (ref 0.5–1.3)
GLUCOSE SERPL-MCNC: 100 MG/DL — SIGNIFICANT CHANGE UP (ref 70–115)
HCT VFR BLD CALC: 32.6 % — LOW (ref 39–50)
HGB BLD-MCNC: 10.1 G/DL — LOW (ref 13–17)
MCHC RBC-ENTMCNC: 31 GM/DL — LOW (ref 32–36)
MCHC RBC-ENTMCNC: 31 PG — SIGNIFICANT CHANGE UP (ref 27–34)
MCV RBC AUTO: 100 FL — SIGNIFICANT CHANGE UP (ref 80–100)
PLATELET # BLD AUTO: 120 K/UL — LOW (ref 150–400)
POTASSIUM SERPL-MCNC: 3.8 MMOL/L — SIGNIFICANT CHANGE UP (ref 3.5–5.3)
POTASSIUM SERPL-SCNC: 3.8 MMOL/L — SIGNIFICANT CHANGE UP (ref 3.5–5.3)
RBC # BLD: 3.26 M/UL — LOW (ref 4.2–5.8)
RBC # FLD: 16.2 % — HIGH (ref 10.3–14.5)
SODIUM SERPL-SCNC: 150 MMOL/L — HIGH (ref 135–145)
WBC # BLD: 6.09 K/UL — SIGNIFICANT CHANGE UP (ref 3.8–10.5)
WBC # FLD AUTO: 6.09 K/UL — SIGNIFICANT CHANGE UP (ref 3.8–10.5)

## 2019-12-03 PROCEDURE — 99232 SBSQ HOSP IP/OBS MODERATE 35: CPT

## 2019-12-03 PROCEDURE — 71045 X-RAY EXAM CHEST 1 VIEW: CPT | Mod: 26

## 2019-12-03 RX ORDER — ACETYLCYSTEINE 200 MG/ML
4 VIAL (ML) MISCELLANEOUS EVERY 6 HOURS
Refills: 0 | Status: DISCONTINUED | OUTPATIENT
Start: 2019-12-03 | End: 2019-12-06

## 2019-12-03 RX ORDER — PIPERACILLIN AND TAZOBACTAM 4; .5 G/20ML; G/20ML
3.38 INJECTION, POWDER, LYOPHILIZED, FOR SOLUTION INTRAVENOUS EVERY 8 HOURS
Refills: 0 | Status: COMPLETED | OUTPATIENT
Start: 2019-12-03 | End: 2019-12-08

## 2019-12-03 RX ORDER — PIPERACILLIN AND TAZOBACTAM 4; .5 G/20ML; G/20ML
3.38 INJECTION, POWDER, LYOPHILIZED, FOR SOLUTION INTRAVENOUS ONCE
Refills: 0 | Status: COMPLETED | OUTPATIENT
Start: 2019-12-03 | End: 2019-12-03

## 2019-12-03 RX ORDER — SODIUM CHLORIDE 9 MG/ML
1000 INJECTION, SOLUTION INTRAVENOUS
Refills: 0 | Status: DISCONTINUED | OUTPATIENT
Start: 2019-12-03 | End: 2019-12-04

## 2019-12-03 RX ORDER — SACCHAROMYCES BOULARDII 250 MG
250 POWDER IN PACKET (EA) ORAL
Refills: 0 | Status: DISCONTINUED | OUTPATIENT
Start: 2019-12-03 | End: 2019-12-11

## 2019-12-03 RX ORDER — DORNASE ALFA 1 MG/ML
2.5 SOLUTION RESPIRATORY (INHALATION)
Refills: 0 | Status: DISCONTINUED | OUTPATIENT
Start: 2019-12-03 | End: 2019-12-03

## 2019-12-03 RX ORDER — HALOPERIDOL DECANOATE 100 MG/ML
1 INJECTION INTRAMUSCULAR ONCE
Refills: 0 | Status: COMPLETED | OUTPATIENT
Start: 2019-12-03 | End: 2019-12-03

## 2019-12-03 RX ORDER — SODIUM CHLORIDE 9 MG/ML
1000 INJECTION, SOLUTION INTRAVENOUS
Refills: 0 | Status: DISCONTINUED | OUTPATIENT
Start: 2019-12-03 | End: 2019-12-03

## 2019-12-03 RX ADMIN — PIPERACILLIN AND TAZOBACTAM 25 GRAM(S): 4; .5 INJECTION, POWDER, LYOPHILIZED, FOR SOLUTION INTRAVENOUS at 21:50

## 2019-12-03 RX ADMIN — Medication 4 MILLILITER(S): at 16:14

## 2019-12-03 RX ADMIN — MEMANTINE HYDROCHLORIDE 5 MILLIGRAM(S): 10 TABLET ORAL at 18:57

## 2019-12-03 RX ADMIN — SODIUM CHLORIDE 30 MILLILITER(S): 9 INJECTION, SOLUTION INTRAVENOUS at 21:51

## 2019-12-03 RX ADMIN — Medication 10 MILLILITER(S): at 05:29

## 2019-12-03 RX ADMIN — Medication 3 MILLILITER(S): at 02:02

## 2019-12-03 RX ADMIN — Medication 3 MILLILITER(S): at 09:09

## 2019-12-03 RX ADMIN — Medication 20 MILLIGRAM(S): at 05:29

## 2019-12-03 RX ADMIN — HEPARIN SODIUM 5000 UNIT(S): 5000 INJECTION INTRAVENOUS; SUBCUTANEOUS at 05:29

## 2019-12-03 RX ADMIN — MEMANTINE HYDROCHLORIDE 5 MILLIGRAM(S): 10 TABLET ORAL at 05:29

## 2019-12-03 RX ADMIN — Medication 3 MILLILITER(S): at 20:58

## 2019-12-03 RX ADMIN — Medication 3 MILLILITER(S): at 16:14

## 2019-12-03 RX ADMIN — Medication 4 MILLILITER(S): at 21:01

## 2019-12-03 RX ADMIN — HEPARIN SODIUM 5000 UNIT(S): 5000 INJECTION INTRAVENOUS; SUBCUTANEOUS at 13:26

## 2019-12-03 RX ADMIN — Medication 0.5 MILLIGRAM(S): at 02:03

## 2019-12-03 RX ADMIN — Medication 10 MILLILITER(S): at 02:03

## 2019-12-03 RX ADMIN — SODIUM CHLORIDE 30 MILLILITER(S): 9 INJECTION, SOLUTION INTRAVENOUS at 19:49

## 2019-12-03 RX ADMIN — Medication 40 MILLIGRAM(S): at 18:57

## 2019-12-03 RX ADMIN — PIPERACILLIN AND TAZOBACTAM 200 GRAM(S): 4; .5 INJECTION, POWDER, LYOPHILIZED, FOR SOLUTION INTRAVENOUS at 14:09

## 2019-12-03 RX ADMIN — HEPARIN SODIUM 5000 UNIT(S): 5000 INJECTION INTRAVENOUS; SUBCUTANEOUS at 21:50

## 2019-12-03 NOTE — DIETITIAN INITIAL EVALUATION ADULT. - PERTINENT LABORATORY DATA
12-03 Na150 mmol/L<H> Glu 100 mg/dL K+ 3.8 mmol/L Cr  0.63 mg/dL BUN 24.0 mg/dL<H> Phos n/a   Alb n/a   PAB n/a

## 2019-12-03 NOTE — PROGRESS NOTE ADULT - SUBJECTIVE AND OBJECTIVE BOX
PULMONARY PROGRESS NOTE      RAJ [id number]    Patient is a 79y old  Male who presents with a chief complaint of COPD exacerbation (03 Dec 2019 11:43)      INTERVAL HPI/OVERNIGHT EVENTS:    MEDICATIONS  (STANDING):  acetylcysteine 20%  Inhalation 4 milliLiter(s) Inhalation every 6 hours  albuterol/ipratropium for Nebulization 3 milliLiter(s) Nebulizer every 6 hours  dextrose 5%. 1000 milliLiter(s) (30 mL/Hr) IV Continuous <Continuous>  guaifenesin/dextromethorphan  Syrup 10 milliLiter(s) Oral every 4 hours  heparin  Injectable 5000 Unit(s) SubCutaneous every 8 hours  memantine 5 milliGRAM(s) Oral two times a day  methylPREDNISolone sodium succinate Injectable 40 milliGRAM(s) IV Push every 12 hours  piperacillin/tazobactam IVPB. 3.375 Gram(s) IV Intermittent once  piperacillin/tazobactam IVPB.. 3.375 Gram(s) IV Intermittent every 8 hours  saccharomyces boulardii 250 milliGRAM(s) Oral two times a day      MEDICATIONS  (PRN):  ALBUTerol    90 MICROgram(s) HFA Inhaler 2 Puff(s) Inhalation every 4 hours PRN Shortness of Breath and/or Wheezing  ALPRAZolam 0.5 milliGRAM(s) Oral three times a day PRN agitation      Allergies    No Known Allergies    Intolerances        PAST MEDICAL & SURGICAL HISTORY:  HLD (hyperlipidemia)  COPD (chronic obstructive pulmonary disease)  Dementia  No significant past surgical history      SOCIAL HISTORY  Smoking History:       REVIEW OF SYSTEMS:    CONSTITUTIONAL:  No distress    HEENT:  Eyes:  No diplopia or blurred vision. ENT:  No earache, sore throat or runny nose.    CARDIOVASCULAR:  No pressure, squeezing, tightness, heaviness or aching about the chest; no palpitations.    RESPIRATORY:  No cough, shortness of breath, PND or orthopnea. Mild SOBOE    GASTROINTESTINAL:  No nausea, vomiting or diarrhea.    GENITOURINARY:  No dysuria, frequency or urgency.    MUSCULOSKELETAL:  No joint pain    SKIN:  No new lesions.    NEUROLOGIC:  No paresthesias, fasciculations, seizures or weakness.    PSYCHIATRIC:  No disorder of thought or mood.    ENDOCRINE:  No heat or cold intolerance, polyuria or polydipsia.    HEMATOLOGICAL:  No easy bruising or bleeding.     Vital Signs Last 24 Hrs  T(C): 36.7 (03 Dec 2019 07:58), Max: 36.7 (02 Dec 2019 17:48)  T(F): 98 (03 Dec 2019 07:58), Max: 98 (02 Dec 2019 17:48)  HR: 76 (03 Dec 2019 09:10) (72 - 86)  BP: 122/76 (03 Dec 2019 07:58) (96/43 - 122/76)  BP(mean): --  RR: 20 (03 Dec 2019 07:58) (19 - 20)  SpO2: 97% (03 Dec 2019 09:10) (97% - 100%)    PHYSICAL EXAMINATION:    GENERAL: The patient is awake and alert in no apparent distress.     HEENT: Head is normocephalic and atraumatic. Extraocular muscles are intact. Mucous membranes are moist.    NECK: Supple.    LUNGS: Clear to auscultation without wheezing, rales or rhonchi; respirations unlabored    HEART: Regular rate and rhythm without murmur.    ABDOMEN: Soft, nontender, and nondistended.      EXTREMITIES: Without any cyanosis, clubbing, rash, lesions or edema.    NEUROLOGIC: Grossly intact.    SKIN: No ulceration or induration present.      LABS:                        10.1   6.09  )-----------( 120      ( 03 Dec 2019 06:24 )             32.6     12-03    150<H>  |  106  |  24.0<H>  ----------------------------<  100  3.8   |  34.0<H>  |  0.63    Ca    9.5      03 Dec 2019 06:24                          MICROBIOLOGY:    RADIOLOGY & ADDITIONAL STUDIES:< from: Xray Chest 1 View- PORTABLE-Urgent (12.03.19 @ 09:34) >      INTERPRETATION:  AP erect chest on December 3, 2019 at 9:21 AM. Patient   has difficulty breathing.    Heart size is within normal limits.    Slight diffuse right thoracic curve again noted.    There is a small dense area in the right lower lung field somewhat   increased from November 27. This could well represent atelectasis but   should be followed to radiographic resolution.    Increased lung volume consistent with COPD again noted.    Present film shows intubation new since November 27.    IMPRESSION: Small dense area developing right lower lung field. Follow-up   recommended. Intubation.    < end of copied text >

## 2019-12-03 NOTE — PHYSICAL THERAPY INITIAL EVALUATION ADULT - PRECAUTIONS/LIMITATIONS, REHAB EVAL
cardiac precautions/oxygen therapy device and L/min/50 % ventri mask , 1 : 1 supervision/fall precautions

## 2019-12-03 NOTE — PHYSICAL THERAPY INITIAL EVALUATION ADULT - IMPAIRMENTS FOUND, PT EVAL
muscle strength/neuromotor development and sensory integration/aerobic capacity/endurance/arousal, attention, and cognition/gait, locomotion, and balance

## 2019-12-03 NOTE — PROGRESS NOTE ADULT - ASSESSMENT
78 yo male with severe dementia and COPD with intercurrent viral infection    Feeding himself yesterday and swallowing OK  Today had difficulty with raising secretions  Afebrile, no WBCs and very questionable RLL finding on CXR  Doubt aspiration  However, continue antibiotics [pending cultures    Repeat CXR and CBC tomorrow  Proceed with swallow eval  NT swuctioning as tolerated

## 2019-12-03 NOTE — DIETITIAN INITIAL EVALUATION ADULT. - ETIOLOGY
related to inadequate protein calorie intake in the setting of dementia, poor dentition and difficulty swallowing

## 2019-12-03 NOTE — PROGRESS NOTE ADULT - SUBJECTIVE AND OBJECTIVE BOX
CC: COPD exacerbation/Dysphagia    INTERVAL HPI/OVERNIGHT EVENTS: Patient seen and examined. Congested, weak cough, not taking po well. Answers questions and follows simple commands.     Vital Signs Last 24 Hrs  T(C): 36.7 (03 Dec 2019 07:58), Max: 36.9 (02 Dec 2019 13:31)  T(F): 98 (03 Dec 2019 07:58), Max: 98.5 (02 Dec 2019 13:31)  HR: 76 (03 Dec 2019 09:10) (72 - 86)  BP: 122/76 (03 Dec 2019 07:58) (96/43 - 122/76)  BP(mean): --  RR: 20 (03 Dec 2019 07:58) (18 - 20)  SpO2: 97% (03 Dec 2019 09:10) (97% - 100%)    PHYSICAL EXAM-  GENERAL:  Lethargic, easily aroused  HEENT: NCAT  CARDIOVASCULAR:  S1, S2  LUNGS: Decreased effort, +rhonchi B/L  ABDOMEN:  soft, non tender, non distension, + bowel sounds  EXTREMITIES: no cyanosis / clubbing / edema.   NEURO: ANTONIO [email or web address] , Oriented to person and place        I&O's Detail    02 Dec 2019 07:01  -  03 Dec 2019 07:00  --------------------------------------------------------  IN:  Total IN: 0 mL    OUT:    Voided: 450 mL  Total OUT: 450 mL    Total NET: -450 mL                                    10.1   6.09  )-----------( 120      ( 03 Dec 2019 06:24 )             32.6     03 Dec 2019 06:24    150    |  106    |  24.0   ----------------------------<  100    3.8     |  34.0   |  0.63     Ca    9.5        03 Dec 2019 06:24        CAPILLARY BLOOD GLUCOSE              MEDICATIONS  (STANDING):  acetylcysteine 20%  Inhalation 4 milliLiter(s) Inhalation every 6 hours  albuterol/ipratropium for Nebulization 3 milliLiter(s) Nebulizer every 6 hours  dextrose 5%. 1000 milliLiter(s) (30 mL/Hr) IV Continuous <Continuous>  guaifenesin/dextromethorphan  Syrup 10 milliLiter(s) Oral every 4 hours  heparin  Injectable 5000 Unit(s) SubCutaneous every 8 hours  memantine 5 milliGRAM(s) Oral two times a day  methylPREDNISolone sodium succinate Injectable 40 milliGRAM(s) IV Push every 12 hours  piperacillin/tazobactam IVPB. 3.375 Gram(s) IV Intermittent once  piperacillin/tazobactam IVPB.. 3.375 Gram(s) IV Intermittent every 8 hours    MEDICATIONS  (PRN):  ALBUTerol    90 MICROgram(s) HFA Inhaler 2 Puff(s) Inhalation every 4 hours PRN Shortness of Breath and/or Wheezing  ALPRAZolam 0.5 milliGRAM(s) Oral three times a day PRN agitation      RADIOLOGY & ADDITIONAL TESTS:     EXAM:  XR CHEST PORTABLE URGENT 1V                          PROCEDURE DATE:  12/03/2019          INTERPRETATION:  AP erect chest on December 3, 2019 at 9:21 AM. Patient   has difficulty breathing.    Heart size is within normal limits.    Slight diffuse right thoracic curve again noted.    There is a small dense area in the right lower lung field somewhat   increased from November 27. This could well represent atelectasis but   should be followed to radiographic resolution.    Increased lung volume consistent with COPD again noted.    Present film shows intubation new since November 27.    IMPRESSION: Small dense area developing right lower lung field. Follow-up   recommended. Intubation.                ANTONETTE BARTON M.D., ATTENDING RADIOLOGIST  This document has been electronically signed. Dec  3 2019  9:49AM

## 2019-12-03 NOTE — DIETITIAN INITIAL EVALUATION ADULT. - OTHER INFO
78 y/o male with PMH of dementia, COPD (not on home O2) was brought to the ED due to difficulty breathing, SaO2 noted pCO2 60s, admitted for acute hypoxic and hypercapnic respiratory failure requiring bipap support. RSV +.   Now with new density right lower lung, presumed Aspiration PNA  Dysphagia   NPO except medications for now, SLP following  Hypernatremia  Spoke with wife and daughter at bedside expressed frustration ( but understanding) with NPO order. Pt c/o of thirst and hunger. Pt was eating well yesterday per wife. failed swallow eval today.  Pt with declining weight approx 60 lbs in the last 2 years  since onset of dementia. and deterioration of dentition.    physical signs of malnutrition [ ]absent [x ]present. [ ]Mild [ ]Moderate [ x]Severe Muscle wasting present at[x] temples  [x ]clavicle [ ]interosseos [x ]calf. [ ]Mild [ ]Moderate [x ]Severe subcutaneous fat loss presents at [ ]ribs x[]orbital region [ ]triceps [x ]buccal area.

## 2019-12-03 NOTE — PHYSICAL THERAPY INITIAL EVALUATION ADULT - ACTIVE RANGE OF MOTION EXAMINATION, REHAB EVAL
bilateral  lower extremity Active ROM was WFL (within functional limits)/bilateral upper extremity Active ROM was WFL (within functional limits)/assessed during functional mobility, resistance not applied

## 2019-12-03 NOTE — PHYSICAL THERAPY INITIAL EVALUATION ADULT - TRANSFER SAFETY CONCERNS NOTED: SIT/STAND, REHAB EVAL
decreased safety awareness/decreased weight-shifting ability/in sagittal plane, narrow base of support

## 2019-12-03 NOTE — PROGRESS NOTE ADULT - ASSESSMENT
80 y/o male with PMH of dementia, COPD (not on home O2) was brought to the ED due to difficulty breathing, SaO2 noted pCO2 60s, admitted for acute hypoxic and hypercapnic respiratory failure requiring bipap support. RSV +.     1. Acute hypercapnic respiratory failure due to COPD exacerbation likely 2/2 RSV, now with new density right lower lung, presumed Aspiration PNA  Add Zosyn  Continue Duo neb, add mucomyst  chest PT  Suction prn  NPO except medications  for now as per SLP, will attempt to reassess in am  continue IV steroids   Bipap PRN/QHS      2. COPD exacerbation   Plan as above     3. RSV   Tylenol PRN     4. Hypothermia   Low body temp occasionally requiring bear hugger, temperature now normal. Nursing instructed to perform rectal temps to confirm any low temperature reading taken orally.   Appreciate Endocrine input: Low cortisol level due to exogenous corticosteroids. No chance of adrenal insufficiency as 40 mg daily of methylprednisolone 10 times the normal adrenal maintenance dose.     5. Dementia with agitation   His mental status is at baseline per family   Continue Memantine 5mg bid  xanax added for anxiety    6. Dysphagia   NPO except medications for now, SLP following    7. Hypernatremia  On IV Dextrose, monitor BMP    # Supportive   DVT prophylaxis: heparin sc   Diet: regular     PT recommend WESLEY 78 y/o male with PMH of dementia, COPD (not on home O2) was brought to the ED due to difficulty breathing, SaO2 noted pCO2 60s, admitted for acute hypoxic and hypercapnic respiratory failure requiring bipap support. RSV +.     8. Now with new density right lower lung, presumed Aspiration PNA  Start Zosyn  Added pulmozyme/ mucomyst inhalation for congestion    6. Dysphagia   NPO except medications for now, SLP following    7. Hypernatremia  On IV Dextrose, monitor BMP    1. s/p Acute hypercapnic respiratory failure due to COPD exacerbation likely 2/2 RSV,   Continue Duo neb, add Mucomyst  chest PT  Suction prn  tapering IV steroids   Pt has remained off BiPAP last night and stayed on NC O2 without acute events    2. s/p COPD exacerbation   Plan as above     3. RSV   Tylenol PRN     4. Hypothermia   Low body temp occasionally requiring bear hugger, temperature now normal. Nursing instructed to perform rectal temps to confirm any low temperature reading taken orally.   Appreciate Endocrine input: Low cortisol level due to exogenous corticosteroids. No chance of adrenal insufficiency as 40 mg daily of methylprednisolone 10 times the normal adrenal maintenance dose.     5. Dementia with agitation   His mental status is at baseline per family   Continue Memantine 5mg bid  xanax added for anxiety    # Supportive   DVT prophylaxis: heparin sc   Diet: regular     PT recommend WESLEY

## 2019-12-03 NOTE — PHYSICAL THERAPY INITIAL EVALUATION ADULT - ADDITIONAL COMMENTS
as per pt. lives with wife in the private house, steps to enter (unable to provide more information) , owns RW,  at present time information about ambulation device and assistance unclear, unknown social support

## 2019-12-03 NOTE — PHYSICAL THERAPY INITIAL EVALUATION ADULT - CRITERIA FOR SKILLED THERAPEUTIC INTERVENTIONS
impairments found/functional limitations in following categories/risk reduction/prevention/anticipated equipment needs at discharge/rehab potential/therapy frequency/predicted duration of therapy intervention

## 2019-12-04 LAB
ANION GAP SERPL CALC-SCNC: 8 MMOL/L — SIGNIFICANT CHANGE UP (ref 5–17)
BUN SERPL-MCNC: 26 MG/DL — HIGH (ref 8–20)
CALCIUM SERPL-MCNC: 10.1 MG/DL — SIGNIFICANT CHANGE UP (ref 8.6–10.2)
CHLORIDE SERPL-SCNC: 104 MMOL/L — SIGNIFICANT CHANGE UP (ref 98–107)
CO2 SERPL-SCNC: 38 MMOL/L — HIGH (ref 22–29)
CREAT SERPL-MCNC: 0.58 MG/DL — SIGNIFICANT CHANGE UP (ref 0.5–1.3)
GLUCOSE SERPL-MCNC: 93 MG/DL — SIGNIFICANT CHANGE UP (ref 70–115)
HCT VFR BLD CALC: 35.4 % — LOW (ref 39–50)
HGB BLD-MCNC: 10.7 G/DL — LOW (ref 13–17)
MAGNESIUM SERPL-MCNC: 2.2 MG/DL — SIGNIFICANT CHANGE UP (ref 1.8–2.6)
MCHC RBC-ENTMCNC: 30.2 GM/DL — LOW (ref 32–36)
MCHC RBC-ENTMCNC: 30.9 PG — SIGNIFICANT CHANGE UP (ref 27–34)
MCV RBC AUTO: 102.3 FL — HIGH (ref 80–100)
PHOSPHATE SERPL-MCNC: 2.6 MG/DL — SIGNIFICANT CHANGE UP (ref 2.4–4.7)
PLATELET # BLD AUTO: 123 K/UL — LOW (ref 150–400)
POTASSIUM SERPL-MCNC: 4.3 MMOL/L — SIGNIFICANT CHANGE UP (ref 3.5–5.3)
POTASSIUM SERPL-SCNC: 4.3 MMOL/L — SIGNIFICANT CHANGE UP (ref 3.5–5.3)
RBC # BLD: 3.46 M/UL — LOW (ref 4.2–5.8)
RBC # FLD: 16.2 % — HIGH (ref 10.3–14.5)
SODIUM SERPL-SCNC: 150 MMOL/L — HIGH (ref 135–145)
WBC # BLD: 9.12 K/UL — SIGNIFICANT CHANGE UP (ref 3.8–10.5)
WBC # FLD AUTO: 9.12 K/UL — SIGNIFICANT CHANGE UP (ref 3.8–10.5)

## 2019-12-04 PROCEDURE — 71045 X-RAY EXAM CHEST 1 VIEW: CPT | Mod: 26

## 2019-12-04 PROCEDURE — 99232 SBSQ HOSP IP/OBS MODERATE 35: CPT

## 2019-12-04 RX ORDER — SODIUM CHLORIDE 9 MG/ML
1000 INJECTION, SOLUTION INTRAVENOUS
Refills: 0 | Status: DISCONTINUED | OUTPATIENT
Start: 2019-12-04 | End: 2019-12-05

## 2019-12-04 RX ADMIN — Medication 4 MILLILITER(S): at 15:34

## 2019-12-04 RX ADMIN — Medication 4 MILLILITER(S): at 03:06

## 2019-12-04 RX ADMIN — HEPARIN SODIUM 5000 UNIT(S): 5000 INJECTION INTRAVENOUS; SUBCUTANEOUS at 05:28

## 2019-12-04 RX ADMIN — Medication 40 MILLIGRAM(S): at 05:28

## 2019-12-04 RX ADMIN — Medication 3 MILLILITER(S): at 03:04

## 2019-12-04 RX ADMIN — Medication 250 MILLIGRAM(S): at 18:06

## 2019-12-04 RX ADMIN — MEMANTINE HYDROCHLORIDE 5 MILLIGRAM(S): 10 TABLET ORAL at 05:27

## 2019-12-04 RX ADMIN — MEMANTINE HYDROCHLORIDE 5 MILLIGRAM(S): 10 TABLET ORAL at 18:53

## 2019-12-04 RX ADMIN — Medication 0.5 MILLIGRAM(S): at 22:25

## 2019-12-04 RX ADMIN — Medication 10 MILLILITER(S): at 18:08

## 2019-12-04 RX ADMIN — PIPERACILLIN AND TAZOBACTAM 25 GRAM(S): 4; .5 INJECTION, POWDER, LYOPHILIZED, FOR SOLUTION INTRAVENOUS at 13:10

## 2019-12-04 RX ADMIN — Medication 40 MILLIGRAM(S): at 18:05

## 2019-12-04 RX ADMIN — Medication 3 MILLILITER(S): at 15:30

## 2019-12-04 RX ADMIN — PIPERACILLIN AND TAZOBACTAM 25 GRAM(S): 4; .5 INJECTION, POWDER, LYOPHILIZED, FOR SOLUTION INTRAVENOUS at 05:27

## 2019-12-04 RX ADMIN — Medication 4 MILLILITER(S): at 09:40

## 2019-12-04 RX ADMIN — Medication 4 MILLILITER(S): at 21:08

## 2019-12-04 RX ADMIN — HALOPERIDOL DECANOATE 1 MILLIGRAM(S): 100 INJECTION INTRAMUSCULAR at 00:46

## 2019-12-04 RX ADMIN — Medication 3 MILLILITER(S): at 21:07

## 2019-12-04 RX ADMIN — HEPARIN SODIUM 5000 UNIT(S): 5000 INJECTION INTRAVENOUS; SUBCUTANEOUS at 22:25

## 2019-12-04 RX ADMIN — Medication 10 MILLILITER(S): at 22:28

## 2019-12-04 RX ADMIN — HEPARIN SODIUM 5000 UNIT(S): 5000 INJECTION INTRAVENOUS; SUBCUTANEOUS at 13:07

## 2019-12-04 RX ADMIN — PIPERACILLIN AND TAZOBACTAM 25 GRAM(S): 4; .5 INJECTION, POWDER, LYOPHILIZED, FOR SOLUTION INTRAVENOUS at 22:25

## 2019-12-04 RX ADMIN — Medication 3 MILLILITER(S): at 09:34

## 2019-12-04 NOTE — SWALLOW BEDSIDE ASSESSMENT ADULT - COMMENTS
As per charting, pt is a "80 y/o male with PMH of dementia, COPD (not on home O2) was brought to the ED due to difficulty breathing, SaO2 noted pCO2 60s, admitted for acute hypoxic and hypercapnic respiratory failure requiring bipap support. RSV +."    Per Pulmonologist 12/3/19 "78 yo male with severe dementia and COPD with intercurrent viral infection    Feeding himself yesterday and swallowing OK  Today had difficulty with raising secretions  Afebrile, no WBCs and very questionable RLL finding on CXR  Doubt aspiration  However, continue antibiotics [pending cultures    Repeat CXR and CBC tomorrow  Proceed with swallow eval  NT swuctioning as tolerated".

## 2019-12-04 NOTE — SWALLOW BEDSIDE ASSESSMENT ADULT - PHARYNGEAL PHASE
increased WOB after swallow further impacted by +audible upper airway breath sounds/inability to clear secretions.

## 2019-12-04 NOTE — PROGRESS NOTE ADULT - SUBJECTIVE AND OBJECTIVE BOX
CC: COPD exacerbation/RSV/Dysphagia    INTERVAL HPI/OVERNIGHT EVENTS: Patient seen and examined. Seen by SLP this am. Patient encouraged to cough but endorses that his cough is weak. He has difficulty with managing  his breathing while attempting to eat. NT suctioning performed however patient to continue to have secretions and activity intolerance. Denies chest pain. No fever or chills.     Vital Signs Last 24 Hrs  T(C): 36.4 (04 Dec 2019 07:29), Max: 36.5 (03 Dec 2019 17:07)  T(F): 97.6 (04 Dec 2019 07:29), Max: 97.7 (03 Dec 2019 17:07)  HR: 90 (04 Dec 2019 09:38) (72 - 90)  BP: 148/80 (04 Dec 2019 07:29) (104/68 - 148/80)  BP(mean): --  RR: 18 (04 Dec 2019 07:29) (18 - 18)  SpO2: 99% (04 Dec 2019 09:38) (98% - 99%)    PHYSICAL EXAM-  GENERAL:  NAD, frail  HEENT: NCAT  CARDIOVASCULAR:  S1, S2  LUNGS: Decreased effort, +rhonchi B/L  ABDOMEN:  soft, non tender, non distension, + bowel sounds  EXTREMITIES: no cyanosis / clubbing / edema.   NEURO: ANTONIO x4 , Oriented to person and place      I&O's Detail    03 Dec 2019 07:01  -  04 Dec 2019 07:00  --------------------------------------------------------  IN:    dextrose 5%.: 210 mL  Total IN: 210 mL    OUT:    Voided: 250 mL  Total OUT: 250 mL    Total NET: -40 mL      04 Dec 2019 07:01  -  04 Dec 2019 13:33  --------------------------------------------------------  IN:  Total IN: 0 mL    OUT:    Voided: 250 mL  Total OUT: 250 mL    Total NET: -250 mL                                    10.7   9.12  )-----------( 123      ( 04 Dec 2019 08:31 )             35.4     04 Dec 2019 08:31    150    |  104    |  26.0   ----------------------------<  93     4.3     |  38.0   |  0.58     Ca    10.1       04 Dec 2019 08:31  Phos  2.6       04 Dec 2019 08:31  Mg     2.2       04 Dec 2019 08:31        CAPILLARY BLOOD GLUCOSE              MEDICATIONS  (STANDING):  acetylcysteine 20%  Inhalation 4 milliLiter(s) Inhalation every 6 hours  albuterol/ipratropium for Nebulization 3 milliLiter(s) Nebulizer every 6 hours  dextrose 5%. 1000 milliLiter(s) (50 mL/Hr) IV Continuous <Continuous>  guaifenesin/dextromethorphan  Syrup 10 milliLiter(s) Oral every 4 hours  heparin  Injectable 5000 Unit(s) SubCutaneous every 8 hours  memantine 5 milliGRAM(s) Oral two times a day  methylPREDNISolone sodium succinate Injectable 40 milliGRAM(s) IV Push every 12 hours  piperacillin/tazobactam IVPB.. 3.375 Gram(s) IV Intermittent every 8 hours  saccharomyces boulardii 250 milliGRAM(s) Oral two times a day    MEDICATIONS  (PRN):  ALBUTerol    90 MICROgram(s) HFA Inhaler 2 Puff(s) Inhalation every 4 hours PRN Shortness of Breath and/or Wheezing  ALPRAZolam 0.5 milliGRAM(s) Oral three times a day PRN agitation      RADIOLOGY & ADDITIONAL TESTS:

## 2019-12-04 NOTE — SWALLOW BEDSIDE ASSESSMENT ADULT - SWALLOW EVAL: DIAGNOSIS
Mild oral dysphagia with puree marked by reduced attention to bolus +stasis throughout oral cavity requiring cues to clear. Cannot rule out pharyngeal dysphagia as pt with no overt s/s aspiration however, increased WOB after swallow further impacted by +audible upper airway breath sounds/inability to clear secretions.  Pt with overall reduced endurance and reduced coordination between respiration and exertion, increasing risk for aspiration.  Pt NT suctioned by NP large amount of thick secretions, however, no change in presentation.

## 2019-12-04 NOTE — SWALLOW BEDSIDE ASSESSMENT ADULT - SWALLOW EVAL: VELAR ELEVATION
E.C.T. Home Care Instructions    ACTIVITY LEVEL:    You may feel sleepy for several hours. It is best to rest until you are more awake and then gradually resume your normal activities in one day. It is recommended, because of the possibility of memory loss and slight confusion post ECT, that you rest in the company of a responsible adult. This confusion and memory loss is expected and should diminish over time. It is also recommended that you do not drive or operate any electrical appliances or machinery during the ECT treatment series. Ask your Doctor, at the time of your treatment, any questions you may have about specific activities.    DIET:    You may wish to start with liquids after your ECT treatment and gradually resume your normal diet. Do not consume alcoholic beverages during the ECT treatment series.    BATHING:    You may shower or bathe as desired.    MEDICATIONS:    Resume all home medications starting with the AM doses not taken prior to ECT treatment. If you experience a headache, it is okay to take your usual pain reliever.    WHEN TO CALL THE DOCTOR:     Headache, memory loss or confusion that does not begin to resolve within 24 hours.    RETURN APPOINTMENT:    Remember you may not eat or drink after midnight, but please take heart or high blood pressure medicines with a sip of water in the morning prior to leaving home.    FOR EMERGENCIES:    If any unusual problems or difficulties occur:    Contact the office [phone] Monday-Friday until 3:00 p.m. After hours, call the hospital  [phone] and ask for the Psychiatry Resident On-call.   Not in view

## 2019-12-04 NOTE — PROGRESS NOTE ADULT - ASSESSMENT
80 y/o male with PMH of dementia, COPD (not on home O2) was brought to the ED due to difficulty breathing, SaO2 noted pCO2 60s, admitted for acute hypoxic and hypercapnic respiratory failure requiring bipap support. RSV +.     1. Now with new density right lower lung, presumed Aspiration PNA  On Zosyn  Added mucomyst inhalation for congestion    2. Dysphagia   NPO except medications for now, SLP following. Palliative consult. May need alternate nutrition until recovery from current respiratory issues    3. Hypernatremia  On IV Dextrose increase to 50 cc/hr monitor BMP    4. s/p Acute hypercapnic respiratory failure due to COPD exacerbation likely 2/2 RSV,   Continue Duo neb, add Mucomyst  chest PT  Suction prn  IV steroids  Pt has remained off BiPAP     5. s/p COPD exacerbation   Plan as above     6. RSV   Tylenol PRN     7. Hypothermia   Low body temp occasionally requiring bear hugger, temperature now normal. Nursing instructed to perform rectal temps to confirm any low temperature reading taken orally.   Appreciate Endocrine input: Low cortisol level due to exogenous corticosteroids. No chance of adrenal insufficiency as 40 mg daily of methylprednisolone 10 times the normal adrenal maintenance dose.     8. Dementia with agitation   His mental status is at baseline per family   Continue Memantine 5mg bid  xanax added for anxiety    # Supportive   DVT prophylaxis: heparin sc   Diet: regular     PT recommend WESLEY 78 y/o male with PMH of dementia, COPD (not on home O2) was brought to the ED due to difficulty breathing, SaO2 noted pCO2 60s, admitted for acute hypoxic and hypercapnic respiratory failure requiring bipap support. RSV +.     1. CXR with no new infiltrate, presumed Aspiration pneumonitis given poor cognition, mental status and weak cough reflex baseline  ruled out PNA  On Zosyn  Added mucomyst inhalation for congestion    2. Dysphagia   NPO except medications for now, SLP following. Palliative consult. May need alternate nutrition until recovery from current respiratory issues    3. Hypernatremia  On IV Dextrose increase to 50 cc/hr monitor BMP    4. s/p Acute hypercapnic respiratory failure due to COPD exacerbation likely 2/2 RSV,   Continue Duo neb, add Mucomyst  chest PT  Suction prn  IV steroids  Pt has remained off BiPAP     5. s/p COPD exacerbation   Plan as above     6. RSV   Tylenol PRN     7. Hypothermia   Low body temp occasionally requiring bear hugger, temperature now normal. Nursing instructed to perform rectal temps to confirm any low temperature reading taken orally.   Appreciate Endocrine input: Low cortisol level due to exogenous corticosteroids. No chance of adrenal insufficiency as 40 mg daily of methylprednisolone 10 times the normal adrenal maintenance dose.     8. Dementia with agitation   His mental status is at baseline per family   Continue Memantine 5mg bid  xanax added for anxiety    # Supportive   DVT prophylaxis: heparin sc   Diet: regular     PT recommend WESLEY

## 2019-12-04 NOTE — SWALLOW BEDSIDE ASSESSMENT ADULT - SLP GENERAL OBSERVATIONS
Pt received upright in bed A&A Ox1, reduced cognition, +O2NC SpO2 100%, +audible upper airway breath sounds +baseline cough +difficulty sustaining phonation due to reduced coordination between respiration & exertion, +1:1 supervision, NP Precious & MAURIZIO Ritchie present, pain scale 0/10 pre & post assessment

## 2019-12-05 LAB
ANION GAP SERPL CALC-SCNC: 13 MMOL/L — SIGNIFICANT CHANGE UP (ref 5–17)
BASE EXCESS BLDA CALC-SCNC: 14.2 MMOL/L — HIGH (ref -3–3)
BASE EXCESS BLDA CALC-SCNC: 14.9 MMOL/L — HIGH (ref -3–3)
BLOOD GAS COMMENTS ARTERIAL: SIGNIFICANT CHANGE UP
BLOOD GAS COMMENTS ARTERIAL: SIGNIFICANT CHANGE UP
BUN SERPL-MCNC: 27 MG/DL — HIGH (ref 8–20)
CALCIUM SERPL-MCNC: 9.7 MG/DL — SIGNIFICANT CHANGE UP (ref 8.6–10.2)
CHLORIDE SERPL-SCNC: 100 MMOL/L — SIGNIFICANT CHANGE UP (ref 98–107)
CO2 SERPL-SCNC: 34 MMOL/L — HIGH (ref 22–29)
CREAT SERPL-MCNC: 0.69 MG/DL — SIGNIFICANT CHANGE UP (ref 0.5–1.3)
GAS PNL BLDA: SIGNIFICANT CHANGE UP
GAS PNL BLDA: SIGNIFICANT CHANGE UP
GLUCOSE SERPL-MCNC: 92 MG/DL — SIGNIFICANT CHANGE UP (ref 70–115)
HCO3 BLDA-SCNC: 38 MMOL/L — HIGH (ref 20–26)
HCO3 BLDA-SCNC: 39 MMOL/L — HIGH (ref 20–26)
HCT VFR BLD CALC: 34.9 % — LOW (ref 39–50)
HGB BLD-MCNC: 10.2 G/DL — LOW (ref 13–17)
HOROWITZ INDEX BLDA+IHG-RTO: 30 — SIGNIFICANT CHANGE UP
HOROWITZ INDEX BLDA+IHG-RTO: SIGNIFICANT CHANGE UP
MAGNESIUM SERPL-MCNC: 2.2 MG/DL — SIGNIFICANT CHANGE UP (ref 1.6–2.6)
MCHC RBC-ENTMCNC: 29.2 GM/DL — LOW (ref 32–36)
MCHC RBC-ENTMCNC: 30 PG — SIGNIFICANT CHANGE UP (ref 27–34)
MCV RBC AUTO: 102.6 FL — HIGH (ref 80–100)
PCO2 BLDA: 68 MMHG — HIGH (ref 35–45)
PCO2 BLDA: 74 MMHG — CRITICAL HIGH (ref 35–45)
PH BLDA: 7.37 — SIGNIFICANT CHANGE UP (ref 7.35–7.45)
PH BLDA: 7.4 — SIGNIFICANT CHANGE UP (ref 7.35–7.45)
PHOSPHATE SERPL-MCNC: 2.4 MG/DL — SIGNIFICANT CHANGE UP (ref 2.4–4.7)
PLATELET # BLD AUTO: 128 K/UL — LOW (ref 150–400)
PO2 BLDA: 73 MMHG — LOW (ref 83–108)
PO2 BLDA: 95 MMHG — SIGNIFICANT CHANGE UP (ref 83–108)
POTASSIUM SERPL-MCNC: 4.2 MMOL/L — SIGNIFICANT CHANGE UP (ref 3.5–5.3)
POTASSIUM SERPL-SCNC: 4.2 MMOL/L — SIGNIFICANT CHANGE UP (ref 3.5–5.3)
RBC # BLD: 3.4 M/UL — LOW (ref 4.2–5.8)
RBC # FLD: 16.1 % — HIGH (ref 10.3–14.5)
SAO2 % BLDA: 96 % — SIGNIFICANT CHANGE UP (ref 95–99)
SAO2 % BLDA: 98 % — SIGNIFICANT CHANGE UP (ref 95–99)
SODIUM SERPL-SCNC: 147 MMOL/L — HIGH (ref 135–145)
WBC # BLD: 8.06 K/UL — SIGNIFICANT CHANGE UP (ref 3.8–10.5)
WBC # FLD AUTO: 8.06 K/UL — SIGNIFICANT CHANGE UP (ref 3.8–10.5)

## 2019-12-05 PROCEDURE — 99223 1ST HOSP IP/OBS HIGH 75: CPT

## 2019-12-05 PROCEDURE — 99233 SBSQ HOSP IP/OBS HIGH 50: CPT

## 2019-12-05 PROCEDURE — 70450 CT HEAD/BRAIN W/O DYE: CPT | Mod: 26

## 2019-12-05 RX ORDER — FLUMAZENIL 0.1 MG/ML
0.2 VIAL (ML) INTRAVENOUS ONCE
Refills: 0 | Status: COMPLETED | OUTPATIENT
Start: 2019-12-05 | End: 2019-12-05

## 2019-12-05 RX ORDER — SODIUM CHLORIDE 9 MG/ML
1000 INJECTION, SOLUTION INTRAVENOUS
Refills: 0 | Status: DISCONTINUED | OUTPATIENT
Start: 2019-12-05 | End: 2019-12-06

## 2019-12-05 RX ORDER — RISPERIDONE 4 MG/1
0.25 TABLET ORAL AT BEDTIME
Refills: 0 | Status: DISCONTINUED | OUTPATIENT
Start: 2019-12-05 | End: 2019-12-05

## 2019-12-05 RX ADMIN — Medication 4 MILLILITER(S): at 09:25

## 2019-12-05 RX ADMIN — Medication 4 MILLILITER(S): at 03:57

## 2019-12-05 RX ADMIN — HEPARIN SODIUM 5000 UNIT(S): 5000 INJECTION INTRAVENOUS; SUBCUTANEOUS at 17:17

## 2019-12-05 RX ADMIN — HEPARIN SODIUM 5000 UNIT(S): 5000 INJECTION INTRAVENOUS; SUBCUTANEOUS at 05:07

## 2019-12-05 RX ADMIN — PIPERACILLIN AND TAZOBACTAM 25 GRAM(S): 4; .5 INJECTION, POWDER, LYOPHILIZED, FOR SOLUTION INTRAVENOUS at 21:29

## 2019-12-05 RX ADMIN — Medication 40 MILLIGRAM(S): at 05:08

## 2019-12-05 RX ADMIN — MEMANTINE HYDROCHLORIDE 5 MILLIGRAM(S): 10 TABLET ORAL at 05:40

## 2019-12-05 RX ADMIN — Medication 3 MILLILITER(S): at 09:24

## 2019-12-05 RX ADMIN — HEPARIN SODIUM 5000 UNIT(S): 5000 INJECTION INTRAVENOUS; SUBCUTANEOUS at 21:32

## 2019-12-05 RX ADMIN — Medication 0.5 MILLIGRAM(S): at 05:06

## 2019-12-05 RX ADMIN — Medication 10 MILLILITER(S): at 05:09

## 2019-12-05 RX ADMIN — PIPERACILLIN AND TAZOBACTAM 25 GRAM(S): 4; .5 INJECTION, POWDER, LYOPHILIZED, FOR SOLUTION INTRAVENOUS at 14:37

## 2019-12-05 RX ADMIN — Medication 3 MILLILITER(S): at 03:56

## 2019-12-05 RX ADMIN — Medication 3 MILLILITER(S): at 22:03

## 2019-12-05 RX ADMIN — Medication 250 MILLIGRAM(S): at 05:06

## 2019-12-05 RX ADMIN — Medication 4 MILLILITER(S): at 22:03

## 2019-12-05 RX ADMIN — PIPERACILLIN AND TAZOBACTAM 25 GRAM(S): 4; .5 INJECTION, POWDER, LYOPHILIZED, FOR SOLUTION INTRAVENOUS at 05:08

## 2019-12-05 RX ADMIN — Medication 10 MILLILITER(S): at 21:29

## 2019-12-05 RX ADMIN — Medication 0.2 MILLIGRAM(S): at 12:12

## 2019-12-05 RX ADMIN — Medication 3 MILLILITER(S): at 15:40

## 2019-12-05 RX ADMIN — Medication 4 MILLILITER(S): at 15:45

## 2019-12-05 NOTE — PROGRESS NOTE ADULT - ASSESSMENT
80 yo male with baseline COPD on Spiriva and symbicort with recent RSV infection   compensated hypercapnia on Avaps, dysphagia  and aspiration risk  Fluctuating   mental status altho he has dense dementia and sleeps most of day on regular basis  CT head if no improvement, continue nebs, pulmonary toilet, abx, Head of bed elevated  IDNR/I , above discussed with family, will use NIV nocturnal and prn, trilogy ordered COPD exacerbation with hypercapnic resp failure  compensated hypercapnia on Avaps, dysphagia  and aspiration risk  Fluctuating   mental status altho he has dense dementia and sleeps most of day on regular basis  CT head if no improvement, continue nebs, pulmonary toilet, abx, Head of bed elevated  IDNR/I , above discussed with family, will use NIV nocturnal and prn, trilogy ordered to decrease re admissions and decrease mortality, Bipap ST has failed

## 2019-12-05 NOTE — PROGRESS NOTE ADULT - ASSESSMENT
78 y/o male with PMH of dementia, COPD (not on home O2) was brought to the ED due to difficulty breathing, SaO2 noted pCO2 60s, admitted for acute hypoxic and hypercapnic respiratory failure requiring bipap support. RSV +. Patient was downgraded to Medical floor and maintained on nasal canula, course complicated by weak cough and dysphagia. Patient now hypercapnic requiring benzo reversal and BIPAP    1. s/p Acute hypercapnic respiratory failure due to COPD exacerbation likely 2/2 RSV, with recurrence of hypercapnia requiring benzo reversal and BIPAP  ABG in 2 hours after BIPAP and reversal of Benzo  Continue Duo neb,  Mucomyst  chest PT  Suction prn  continue IV steroids    2. CXR with no new infiltrate, presumed Aspiration pneumonitis given poor cognition, mental status and weak cough reflex baseline  ruled out PNA  On Zosyn  Added mucomyst inhalation for congestion    3. Dysphagia   Has been followed by SLP, diet was advanced yesterday to pureed with no liquids however patient now requiring BIPAP, will need to reassess     4. Hypernatremia  On IV Dextrose increase to 50 cc/hr monitor BMP      5. s/p COPD exacerbation   Plan as above     6. RSV   Tylenol PRN     7. Hypothermia   Low body temp occasionally requiring bear hugger, temperature now normal. Nursing instructed to perform rectal temps to confirm any low temperature reading taken orally.   Appreciate Endocrine input: Low cortisol level due to exogenous corticosteroids. No chance of adrenal insufficiency as 40 mg daily of methylprednisolone 10 times the normal adrenal maintenance dose.     8. Dementia with agitation   His mental status is at baseline per family   Continue Memantine 5mg bid  xanax dc'd, low dose Risperidone prn added    # Supportive   DVT prophylaxis: heparin sc 78 y/o male with PMH of dementia, COPD (not on home O2) was brought to the ED due to difficulty breathing, SaO2 noted pCO2 60s, admitted for acute hypoxic and hypercapnic respiratory failure requiring bipap support. RSV +. Patient was downgraded to Medical floor and maintained on nasal canula, course complicated by weak cough and dysphagia. Patient now hypercapnic requiring benzo reversal and BIPAP    9. acute hypercapnic encephalopathy  hypoventilation sec to benzos  reversed  BiPAP applied  Cont BiPAP through night until AM      1. s/p Acute hypercapnic respiratory failure due to COPD exacerbation likely 2/2 RSV, with recurrence of hypercapnia requiring benzo reversal and BIPAP  ABG in 2 hours after BIPAP and reversal of Benzo  Continue Duo neb,  Mucomyst  chest PT  Suction prn  continue IV steroids    2. CXR with no new infiltrate, presumed Aspiration pneumonitis given poor cognition, mental status and weak cough reflex baseline  ruled out PNA  On Zosyn  Added mucomyst inhalation for congestion    3. Dysphagia   Has been followed by SLP, diet was advanced yesterday to pureed with no liquids however patient now requiring BIPAP, will need to reassess     4. Hypernatremia  On IV Dextrose increase to 50 cc/hr monitor BMP      5. s/p COPD exacerbation   Plan as above     6. RSV   Tylenol PRN     7. Hypothermia   Low body temp occasionally requiring bear hugger, temperature now normal. Nursing instructed to perform rectal temps to confirm any low temperature reading taken orally.   Appreciate Endocrine input: Low cortisol level due to exogenous corticosteroids. No chance of adrenal insufficiency as 40 mg daily of methylprednisolone 10 times the normal adrenal maintenance dose.     8. Dementia with agitation   His mental status is at baseline per family   Continue Memantine 5mg bid  xanax dc'd, low dose Risperidone prn added  Family strongly refusing any Benzos or other antiagitation meds.  to use 1:1 with or without restraints if needed    # Supportive   DVT prophylaxis: heparin sc

## 2019-12-05 NOTE — PROGRESS NOTE ADULT - SUBJECTIVE AND OBJECTIVE BOX
PULMONARY PROGRESS NOTE      RAJ SCHULER-[id number]    Patient is a 79y old  Male who presents with a chief complaint of COPD exacerbation (05 Dec 2019 15:49)      INTERVAL HPI/OVERNIGHT EVENTS:  minimally responseive  on Avaps  MEDICATIONS  (STANDING):  acetylcysteine 20%  Inhalation 4 milliLiter(s) Inhalation every 6 hours  albuterol/ipratropium for Nebulization 3 milliLiter(s) Nebulizer every 6 hours  dextrose 5%. 1000 milliLiter(s) (50 mL/Hr) IV Continuous <Continuous>  guaifenesin/dextromethorphan  Syrup 10 milliLiter(s) Oral every 4 hours  heparin  Injectable 5000 Unit(s) SubCutaneous every 8 hours  memantine 5 milliGRAM(s) Oral two times a day  piperacillin/tazobactam IVPB.. 3.375 Gram(s) IV Intermittent every 8 hours  saccharomyces boulardii 250 milliGRAM(s) Oral two times a day      MEDICATIONS  (PRN):  ALBUTerol    90 MICROgram(s) HFA Inhaler 2 Puff(s) Inhalation every 4 hours PRN Shortness of Breath and/or Wheezing      Allergies    No Known Allergies    Intolerances        PAST MEDICAL & SURGICAL HISTORY:  HLD (hyperlipidemia)  COPD (chronic obstructive pulmonary disease)  Dementia  No significant past surgical history      SOCIAL HISTORY  Smoking History:       REVIEW OF SYSTEMS:    N/A    Vital Signs Last 24 Hrs  T(C): 36.3 (05 Dec 2019 08:43), Max: 36.3 (04 Dec 2019 23:59)  T(F): 97.4 (05 Dec 2019 08:43), Max: 97.4 (04 Dec 2019 23:59)  HR: 75 (05 Dec 2019 15:42) (74 - 88)  BP: 101/66 (05 Dec 2019 15:16) (87/58 - 128/62)  BP(mean): --  RR: 18 (05 Dec 2019 15:16) (18 - 18)  SpO2: 98% (05 Dec 2019 15:42) (95% - 100%)    PHYSICAL EXAMINATION:    GENERAL: The patient is in no apparent distress on AVAPS     HEENT: Head is normocephalic and atraumatic. Extraocular muscles are intact. Mucous membranes are moist.    NECK: Supple.    LUNGS: diminished air entry bilat  without wheezing, rales or rhonchi; respirations unlabored    HEART: Regular rate and rhythm without murmur.    ABDOMEN: Soft, nontender, and nondistended.      EXTREMITIES: Without any cyanosis, clubbing, rash, lesions or edema.    NEUROLOGIC: moves ext, intermittently agitated    LABS:                        10.2   8.06  )-----------( 128      ( 05 Dec 2019 07:38 )             34.9     12-05    147<H>  |  100  |  27.0<H>  ----------------------------<  92  4.2   |  34.0<H>  |  0.69    Ca    9.7      05 Dec 2019 07:38  Phos  2.4     12-05  Mg     2.2     12-05          ABG - ( 05 Dec 2019 13:57 )  pH, Arterial: 7.40  pH, Blood: x     /  pCO2: 68    /  pO2: 73    / HCO3: 39    / Base Excess: 14.9  /  SaO2: 96                              MICROBIOLOGY:    RADIOLOGY & ADDITIONAL STUDIES:  CXRs reviewed

## 2019-12-05 NOTE — CHART NOTE - NSCHARTNOTEFT_GEN_A_CORE
Upon Nutritional Assessment by the Registered Dietitian your patient was determined to meet criteria / has evidence of the following diagnosis/diagnoses:              [x ] Severe Protein Calorie Malnutrition       Findings as based on:  •  Comprehensive nutrition assessment and consultation      Treatment:    The following diet has been recommended:    ensure pudding  TID  PROVIDER Section:     By signing this assessment you are acknowledging and agree with the diagnosis/diagnoses assigned by the Registered Dietitian    Comments:

## 2019-12-05 NOTE — CONSULT NOTE ADULT - SUBJECTIVE AND OBJECTIVE BOX
PALLIATIVE CONSULT    CC: Patient is a 79y old  Male who presents with a chief complaint of COPD exacerbation (05 Dec 2019 11:54)    HPI:  Pt is unable to provide any history. Info from chart review and medical staff.     Mr. Ding is a 79 year old male with PMHx of dementia and COPD (not O2 dependent) presented with worsening dyspnea, wheezing, hypoxia and confusion.  +sick contact as wife with flu like sx. In ER, found with +RSV. Admitted for acute respiratory failure with hypoxemia 2/2 COPD exacerbation and RSV. Started on antibiotic, IV methylprednisolone, nebs and BiPAP support. Subsequently transitioned to O2NC. Course further complicated by hypothermia, dysphagia. This morning pt noted to have worsening lethargy, concern for benzo toxicity 2/2 recently starting ativan s/p flumazenil this AM and ABG with increased CO2 retention, placed on BIPAP support.  GOC from 11/28 by primary team, BERNICE for DNR/I completed with family. Palliative consulted to assist with ongoing goc.      Unable to perform ROS due to lethargy.     PERTINENT PMH REVIEWED: Yes     PAST MEDICAL & SURGICAL HISTORY:  HLD (hyperlipidemia)  COPD (chronic obstructive pulmonary disease)  Dementia  No significant past surgical history      SOCIAL HISTORY:    Admitted from:  home   - lives with wife  - children: 2 sons and dtr    Baseline ADLs (prior to admission): some independence   Karnofsky: 30%    Surrogate/HCP/Guardian:   - no formal HCP seen in chart  - next of kin is wife Julissa [phone]    ADVANCE DIRECTIVES: DNR/I MOLST completed on 11/28/19    FAMILY HISTORY:  No pertinent family history in first degree relatives  Unable to obtain due to dementia and lethargy.     Allergies    No Known Allergies    Intolerances        MEDICATIONS  (STANDING):  acetylcysteine 20%  Inhalation 4 milliLiter(s) Inhalation every 6 hours  albuterol/ipratropium for Nebulization 3 milliLiter(s) Nebulizer every 6 hours  dextrose 5%. 1000 milliLiter(s) (50 mL/Hr) IV Continuous <Continuous>  guaifenesin/dextromethorphan  Syrup 10 milliLiter(s) Oral every 4 hours  heparin  Injectable 5000 Unit(s) SubCutaneous every 8 hours  memantine 5 milliGRAM(s) Oral two times a day  piperacillin/tazobactam IVPB.. 3.375 Gram(s) IV Intermittent every 8 hours  saccharomyces boulardii 250 milliGRAM(s) Oral two times a day    MEDICATIONS  (PRN):  ALBUTerol    90 MICROgram(s) HFA Inhaler 2 Puff(s) Inhalation every 4 hours PRN Shortness of Breath and/or Wheezing      PHYSICAL EXAM:    Vital Signs Last 24 Hrs  T(C): 36.3 (05 Dec 2019 08:43), Max: 36.3 (04 Dec 2019 23:59)  T(F): 97.4 (05 Dec 2019 08:43), Max: 97.4 (04 Dec 2019 23:59)  HR: 75 (05 Dec 2019 15:42) (74 - 88)  BP: 101/66 (05 Dec 2019 15:16) (87/58 - 128/62)  BP(mean): --  RR: 18 (05 Dec 2019 15:16) (18 - 18)  SpO2: 98% (05 Dec 2019 15:42) (95% - 100%)    General: elderly. Resting comfortably. No acute distress.   HEENT: mucous membrane dry   Lungs: coarse breath sounds. non-labored. BIPAP  CV: +s1/s2. RRR  GI:+ bowel sound. abdomen soft, non-tender, non-distended.  MSK:  No cyanosis or edema. weakness.   Neuro: nonfocal. lethargic, moves to noxious stimuli.    Skin: warm and dry.      LABS:                        10.2   8.06  )-----------( 128      ( 05 Dec 2019 07:38 )             34.9     12-05    147<H>  |  100  |  27.0<H>  ----------------------------<  92  4.2   |  34.0<H>  |  0.69    Ca    9.7      05 Dec 2019 07:38  Phos  2.4     12-05  Mg     2.2     12-05    RADIOLOGY & ADDITIONAL STUDIES:      CXR 11/27/19  FINDINGS:   The lungs  are clear gross airspace consolidations or effusions.   Hyperaerated upper lobes/hyperlucency may indicate of emphysematous lung   disease.    The heart and mediastinum are within normal limits.    Visualized osseous structures are intact.    IMPRESSION:   No evidence of active chest disease.   Hyperlucent upper   lobes which may indicate emphysematous lung disease.    CXR 12/3/19  Heart size is within normal limits.    Slight diffuse right thoracic curve again noted.    There is a small dense area in the right lower lung field somewhat   increased from November 27. This could well represent atelectasis but   should be followed to radiographic resolution.    Increased lung volume consistent with COPD again noted.    Present film shows intubation new since November 27.    IMPRESSION: Small dense area developing right lower lung field. Follow-up   recommended. Intubation.    CXR 12/4/19  Findings:  The lungs are clear. The heart and mediastinum are unremarkable.  No   hilar abnormality is seen.  There is no evidence of pleural effusion. The   visualized osseous structures demonstrate degenerative changes in the spine.    Impression:  No evidence of acute cardiopulmonary disease.    Thank you for the opportunity to assist with the care of this patient.   [facility] [phone].

## 2019-12-05 NOTE — CONSULT NOTE ADULT - ASSESSMENT
78 yo male with baseline COPD on Spiriva and symbicort with recent RSV infection along with family \  Respir failure and worsened mental status altho he has dense dementia and sleeps most of day on reguar basis  Agree with steroids, BiPap, supportive Rx  Spoke with wife and daughter who confirm patient would not wish to be intubated but wish supportive measures and BiPap to be continued for now.  Will discuss
79M with h/o dementia and COPD admitted for acute respiratory failure 2/2 COPD exacerbation and +RSV requiring bipap support eventually transitioned off to NC. Course further complicated by dysphagia, ?aspiration PNA and overnight with increased lethargy and AMS, ?benzo toxicity vs CO2 narcosis s/p flumazenil, placed back on bipap support this AM. Palliative consult to assist with ongoing goc.     PLAN    Acute on Chronic Respiratory Failure with Hypercapnia  Acute COPD exacerbation/Positive RSV URI/Dyspnea  - back on BIPAP 12/6 at FiO2 30%  - responsive to noxious stimuli  - ABG from AM with increased CO2 retention  - wean BIPAP as tolerated, nebs, antibiotics  - pulm following, input appreciated    Alzheimer Dementia   - per chart baseline confusion  - given encephalopathy and no family present, difficult to assess stage of dementia  - will follow up with family to obtain additional collateral info    Dysphagia  - last S/S on 12/4 rec NPO  - rec repeating S/S when respiratory status and mental status improves    Debility  - ambulates with RW at baseline  - seen by PT 12/3 and rec WESLEY    Palliative Care Encounter  Case reviewed and discussed with hospitalist. Pt is lethargic and unable to engage in any meaningful conversation today. No family present during my visit. Will reach out to wife and children for collateral info. Will follow hospital course.
Low cortisol level due to exogenous corticosteroids. No chance of adrenal insufficiency as 40 mg daily of methylprednisolone 10 times the normal adrenal maintenance dose.   Normal thyroid function.
80 y/o M w/ a PMHx of dementia, COPD (not on home O2) w/ acute on chronic hypercapnic and hypoxia respiratory failure due to COPD exacerbation 2/2 RSV.    At this time, pt does not require ICU level of care. Please reconsult should pt's condition deteriorate.    Case discussed w/ ICU attending Dr. Moss and hospitalist Dr. Smiley.

## 2019-12-05 NOTE — PROGRESS NOTE ADULT - SUBJECTIVE AND OBJECTIVE BOX
CC: COPD exacerbation/RSV/dysphagia    INTERVAL HPI/OVERNIGHT EVENTS: Patient seen and examined. Called by RN, patient reportedly received Xanax 0.25 mg last night and early this am, now arousable only to painful stimuli.     Vital Signs Last 24 Hrs  T(C): 36.3 (05 Dec 2019 08:43), Max: 36.5 (04 Dec 2019 15:08)  T(F): 97.4 (05 Dec 2019 08:43), Max: 97.7 (04 Dec 2019 15:08)  HR: 74 (05 Dec 2019 09:25) (74 - 88)  BP: 128/62 (05 Dec 2019 08:43) (87/58 - 131/70)  BP(mean): --  RR: 18 (05 Dec 2019 08:43) (18 - 18)  SpO2: 100% (05 Dec 2019 09:25) (97% - 100%)      PHYSICAL EXAM-  GENERAL:  Obtunded  HEENT: NCAT  CARDIOVASCULAR:  S1, S2  LUNGS: Decreased effort, +rhonchi  ABDOMEN:  soft, non tender, non distension, + bowel sounds  EXTREMITIES: no cyanosis / clubbing / edema.   NEURO: ALVAREZ x 4 to painful stimuli      I&O's Detail    04 Dec 2019 07:01  -  05 Dec 2019 07:00  --------------------------------------------------------  IN:  Total IN: 0 mL    OUT:    Voided: 550 mL  Total OUT: 550 mL    Total NET: -550 mL                                    10.2   8.06  )-----------( 128      ( 05 Dec 2019 07:38 )             34.9     05 Dec 2019 07:38    147    |  100    |  27.0   ----------------------------<  92     4.2     |  34.0   |  0.69     Ca    9.7        05 Dec 2019 07:38  Phos  2.4       05 Dec 2019 07:38  Mg     2.2       05 Dec 2019 07:38    ABG - ( 05 Dec 2019 11:13 )  pH, Arterial: 7.37  pH, Blood: x     /  pCO2: 74    /  pO2: 95    / HCO3: 38    / Base Excess: 14.2  /  SaO2: 98                  CAPILLARY BLOOD GLUCOSE              MEDICATIONS  (STANDING):  acetylcysteine 20%  Inhalation 4 milliLiter(s) Inhalation every 6 hours  albuterol/ipratropium for Nebulization 3 milliLiter(s) Nebulizer every 6 hours  dextrose 5%. 1000 milliLiter(s) (50 mL/Hr) IV Continuous <Continuous>  flumazenil Injectable 0.2 milliGRAM(s) IV Push once  guaifenesin/dextromethorphan  Syrup 10 milliLiter(s) Oral every 4 hours  heparin  Injectable 5000 Unit(s) SubCutaneous every 8 hours  memantine 5 milliGRAM(s) Oral two times a day  piperacillin/tazobactam IVPB.. 3.375 Gram(s) IV Intermittent every 8 hours  predniSONE   Tablet 40 milliGRAM(s) Oral daily  risperiDONE   Tablet 0.25 milliGRAM(s) Oral at bedtime  saccharomyces boulardii 250 milliGRAM(s) Oral two times a day    MEDICATIONS  (PRN):  ALBUTerol    90 MICROgram(s) HFA Inhaler 2 Puff(s) Inhalation every 4 hours PRN Shortness of Breath and/or Wheezing      RADIOLOGY & ADDITIONAL TESTS:

## 2019-12-06 LAB
ANION GAP SERPL CALC-SCNC: 10 MMOL/L — SIGNIFICANT CHANGE UP (ref 5–17)
BUN SERPL-MCNC: 26 MG/DL — HIGH (ref 8–20)
CALCIUM SERPL-MCNC: 9.6 MG/DL — SIGNIFICANT CHANGE UP (ref 8.6–10.2)
CHLORIDE SERPL-SCNC: 101 MMOL/L — SIGNIFICANT CHANGE UP (ref 98–107)
CO2 SERPL-SCNC: 34 MMOL/L — HIGH (ref 22–29)
CREAT SERPL-MCNC: 0.64 MG/DL — SIGNIFICANT CHANGE UP (ref 0.5–1.3)
GLUCOSE SERPL-MCNC: 90 MG/DL — SIGNIFICANT CHANGE UP (ref 70–115)
POTASSIUM SERPL-MCNC: 3.9 MMOL/L — SIGNIFICANT CHANGE UP (ref 3.5–5.3)
POTASSIUM SERPL-SCNC: 3.9 MMOL/L — SIGNIFICANT CHANGE UP (ref 3.5–5.3)
SODIUM SERPL-SCNC: 145 MMOL/L — SIGNIFICANT CHANGE UP (ref 135–145)

## 2019-12-06 PROCEDURE — 99497 ADVNCD CARE PLAN 30 MIN: CPT | Mod: 25

## 2019-12-06 PROCEDURE — 99232 SBSQ HOSP IP/OBS MODERATE 35: CPT

## 2019-12-06 PROCEDURE — 99233 SBSQ HOSP IP/OBS HIGH 50: CPT

## 2019-12-06 PROCEDURE — 51702 INSERT TEMP BLADDER CATH: CPT

## 2019-12-06 PROCEDURE — 74018 RADEX ABDOMEN 1 VIEW: CPT | Mod: 26

## 2019-12-06 RX ORDER — SODIUM CHLORIDE 9 MG/ML
1000 INJECTION, SOLUTION INTRAVENOUS
Refills: 0 | Status: DISCONTINUED | OUTPATIENT
Start: 2019-12-06 | End: 2019-12-09

## 2019-12-06 RX ORDER — TAMSULOSIN HYDROCHLORIDE 0.4 MG/1
0.4 CAPSULE ORAL AT BEDTIME
Refills: 0 | Status: DISCONTINUED | OUTPATIENT
Start: 2019-12-06 | End: 2019-12-11

## 2019-12-06 RX ORDER — LACTULOSE 10 G/15ML
200 SOLUTION ORAL DAILY
Refills: 0 | Status: DISCONTINUED | OUTPATIENT
Start: 2019-12-07 | End: 2019-12-11

## 2019-12-06 RX ORDER — SODIUM CHLORIDE 9 MG/ML
1000 INJECTION, SOLUTION INTRAVENOUS
Refills: 0 | Status: DISCONTINUED | OUTPATIENT
Start: 2019-12-06 | End: 2019-12-06

## 2019-12-06 RX ORDER — LACTULOSE 10 G/15ML
200 SOLUTION ORAL ONCE
Refills: 0 | Status: COMPLETED | OUTPATIENT
Start: 2019-12-06 | End: 2019-12-06

## 2019-12-06 RX ADMIN — HEPARIN SODIUM 5000 UNIT(S): 5000 INJECTION INTRAVENOUS; SUBCUTANEOUS at 13:12

## 2019-12-06 RX ADMIN — PIPERACILLIN AND TAZOBACTAM 25 GRAM(S): 4; .5 INJECTION, POWDER, LYOPHILIZED, FOR SOLUTION INTRAVENOUS at 23:27

## 2019-12-06 RX ADMIN — Medication 3 MILLILITER(S): at 16:10

## 2019-12-06 RX ADMIN — PIPERACILLIN AND TAZOBACTAM 25 GRAM(S): 4; .5 INJECTION, POWDER, LYOPHILIZED, FOR SOLUTION INTRAVENOUS at 05:36

## 2019-12-06 RX ADMIN — Medication 40 MILLIGRAM(S): at 05:36

## 2019-12-06 RX ADMIN — PIPERACILLIN AND TAZOBACTAM 25 GRAM(S): 4; .5 INJECTION, POWDER, LYOPHILIZED, FOR SOLUTION INTRAVENOUS at 13:13

## 2019-12-06 RX ADMIN — LACTULOSE 200 GRAM(S): 10 SOLUTION ORAL at 23:30

## 2019-12-06 RX ADMIN — Medication 250 MILLIGRAM(S): at 05:38

## 2019-12-06 RX ADMIN — Medication 3 MILLILITER(S): at 20:38

## 2019-12-06 RX ADMIN — Medication 4 MILLILITER(S): at 02:36

## 2019-12-06 RX ADMIN — TAMSULOSIN HYDROCHLORIDE 0.4 MILLIGRAM(S): 0.4 CAPSULE ORAL at 23:28

## 2019-12-06 RX ADMIN — SODIUM CHLORIDE 50 MILLILITER(S): 9 INJECTION, SOLUTION INTRAVENOUS at 23:25

## 2019-12-06 RX ADMIN — Medication 10 MILLILITER(S): at 17:58

## 2019-12-06 RX ADMIN — Medication 250 MILLIGRAM(S): at 23:27

## 2019-12-06 RX ADMIN — Medication 3 MILLILITER(S): at 02:35

## 2019-12-06 RX ADMIN — MEMANTINE HYDROCHLORIDE 5 MILLIGRAM(S): 10 TABLET ORAL at 05:39

## 2019-12-06 RX ADMIN — Medication 10 MILLILITER(S): at 23:29

## 2019-12-06 RX ADMIN — MEMANTINE HYDROCHLORIDE 5 MILLIGRAM(S): 10 TABLET ORAL at 23:26

## 2019-12-06 RX ADMIN — Medication 10 MILLILITER(S): at 09:25

## 2019-12-06 RX ADMIN — Medication 10 MILLILITER(S): at 13:13

## 2019-12-06 RX ADMIN — Medication 10 MILLILITER(S): at 05:39

## 2019-12-06 RX ADMIN — HEPARIN SODIUM 5000 UNIT(S): 5000 INJECTION INTRAVENOUS; SUBCUTANEOUS at 23:26

## 2019-12-06 RX ADMIN — Medication 250 MILLIGRAM(S): at 17:58

## 2019-12-06 RX ADMIN — Medication 3 MILLILITER(S): at 08:25

## 2019-12-06 RX ADMIN — HEPARIN SODIUM 5000 UNIT(S): 5000 INJECTION INTRAVENOUS; SUBCUTANEOUS at 05:36

## 2019-12-06 RX ADMIN — MEMANTINE HYDROCHLORIDE 5 MILLIGRAM(S): 10 TABLET ORAL at 17:58

## 2019-12-06 NOTE — PROGRESS NOTE ADULT - SUBJECTIVE AND OBJECTIVE BOX
CC: COPD exacerbation/RSV/Dysphagia/ASP PNA    INTERVAL HPI/OVERNIGHT EVENTS: Patient seen and examined. Awake today, wants to eat and drink. Seen by SLP this am but pocketing food. C/O abdominal pain, bladder scan shows retention. Has weak cough with difficulty bringing up secretions. BM reported on 12/5.     Vital Signs Last 24 Hrs  T(C): 36.4 (06 Dec 2019 08:31), Max: 36.4 (05 Dec 2019 23:54)  T(F): 97.6 (06 Dec 2019 08:31), Max: 97.6 (05 Dec 2019 23:54)  HR: 80 (06 Dec 2019 08:45) (75 - 84)  BP: 145/78 (06 Dec 2019 08:31) (101/66 - 145/78)  BP(mean): --  RR: 18 (06 Dec 2019 08:31) (18 - 18)  SpO2: 98% (06 Dec 2019 08:31) (96% - 99%)    PHYSICAL EXAM-  GENERAL:  Awake, NAD  HEENT: NCAT  CARDIOVASCULAR:  S1, S2  LUNGS: Decreased effort, upper airway congestion, no wheezes, rales  ABDOMEN:  soft, non tender, non distension, + bowel sounds  EXTREMITIES: no cyanosis / clubbing / edema.   NEURO: Alert and oriented to self, ALVAREZ x 4    I&O's Detail                                10.2   8.06  )-----------( 128      ( 05 Dec 2019 07:38 )             34.9     06 Dec 2019 09:12    145    |  101    |  26.0   ----------------------------<  90     3.9     |  34.0   |  0.64     Ca    9.6        06 Dec 2019 09:12  Phos  2.4       05 Dec 2019 07:38  Mg     2.2       05 Dec 2019 07:38        CAPILLARY BLOOD GLUCOSE              MEDICATIONS  (STANDING):  albuterol/ipratropium for Nebulization 3 milliLiter(s) Nebulizer every 6 hours  dextrose 5%. 1000 milliLiter(s) (50 mL/Hr) IV Continuous <Continuous>  guaifenesin/dextromethorphan  Syrup 10 milliLiter(s) Oral every 4 hours  heparin  Injectable 5000 Unit(s) SubCutaneous every 8 hours  memantine 5 milliGRAM(s) Oral two times a day  piperacillin/tazobactam IVPB.. 3.375 Gram(s) IV Intermittent every 8 hours  saccharomyces boulardii 250 milliGRAM(s) Oral two times a day  tamsulosin 0.4 milliGRAM(s) Oral at bedtime    MEDICATIONS  (PRN):  ALBUTerol    90 MICROgram(s) HFA Inhaler 2 Puff(s) Inhalation every 4 hours PRN Shortness of Breath and/or Wheezing      RADIOLOGY & ADDITIONAL TESTS: CC: COPD exacerbation/RSV/Dysphagia/ASP PNA    INTERVAL HPI/OVERNIGHT EVENTS: Patient seen and examined. Awake today, wants to eat and drink. Seen by SLP this am but pocketing food. C/O abdominal pain, bladder scan shows retention. Has weak cough with difficulty bringing up secretions. BM reported on 12/5. Patient was seen in afternoon by SLP as patient was more alert and  improved cognition, diet was advanced to pureed with no liquids.     Vital Signs Last 24 Hrs  T(C): 36.4 (06 Dec 2019 08:31), Max: 36.4 (05 Dec 2019 23:54)  T(F): 97.6 (06 Dec 2019 08:31), Max: 97.6 (05 Dec 2019 23:54)  HR: 80 (06 Dec 2019 08:45) (75 - 84)  BP: 145/78 (06 Dec 2019 08:31) (101/66 - 145/78)  BP(mean): --  RR: 18 (06 Dec 2019 08:31) (18 - 18)  SpO2: 98% (06 Dec 2019 08:31) (96% - 99%)    PHYSICAL EXAM-  GENERAL:  Awake, NAD  HEENT: NCAT  CARDIOVASCULAR:  S1, S2  LUNGS: Decreased effort, upper airway congestion, no wheezes, rales  ABDOMEN:  soft, non tender, non distension, + bowel sounds  EXTREMITIES: no cyanosis / clubbing / edema.   NEURO: Alert and oriented to self, ALVAREZ x 4    I&O's Detail                                10.2   8.06  )-----------( 128      ( 05 Dec 2019 07:38 )             34.9     06 Dec 2019 09:12    145    |  101    |  26.0   ----------------------------<  90     3.9     |  34.0   |  0.64     Ca    9.6        06 Dec 2019 09:12  Phos  2.4       05 Dec 2019 07:38  Mg     2.2       05 Dec 2019 07:38        CAPILLARY BLOOD GLUCOSE              MEDICATIONS  (STANDING):  albuterol/ipratropium for Nebulization 3 milliLiter(s) Nebulizer every 6 hours  dextrose 5%. 1000 milliLiter(s) (50 mL/Hr) IV Continuous <Continuous>  guaifenesin/dextromethorphan  Syrup 10 milliLiter(s) Oral every 4 hours  heparin  Injectable 5000 Unit(s) SubCutaneous every 8 hours  memantine 5 milliGRAM(s) Oral two times a day  piperacillin/tazobactam IVPB.. 3.375 Gram(s) IV Intermittent every 8 hours  saccharomyces boulardii 250 milliGRAM(s) Oral two times a day  tamsulosin 0.4 milliGRAM(s) Oral at bedtime    MEDICATIONS  (PRN):  ALBUTerol    90 MICROgram(s) HFA Inhaler 2 Puff(s) Inhalation every 4 hours PRN Shortness of Breath and/or Wheezing      RADIOLOGY & ADDITIONAL TESTS: CC: COPD exacerbation/RSV/Dysphagia/ASP PNA/ waxing and waning mentum/ s/p hypercapnic encephalopathy    INTERVAL HPI/OVERNIGHT EVENTS: Patient seen and examined. Awake today, wants to eat and drink. Seen by SLP this am but pocketing food. C/O abdominal pain, bladder scan shows retention. Has weak cough with difficulty bringing up secretions. BM reported on 12/5. Patient was seen in afternoon by SLP as patient was more alert and  improved cognition, diet was advanced to pureed with no liquids.     Vital Signs Last 24 Hrs  T(C): 36.4 (06 Dec 2019 08:31), Max: 36.4 (05 Dec 2019 23:54)  T(F): 97.6 (06 Dec 2019 08:31), Max: 97.6 (05 Dec 2019 23:54)  HR: 80 (06 Dec 2019 08:45) (75 - 84)  BP: 145/78 (06 Dec 2019 08:31) (101/66 - 145/78)  BP(mean): --  RR: 18 (06 Dec 2019 08:31) (18 - 18)  SpO2: 98% (06 Dec 2019 08:31) (96% - 99%)    PHYSICAL EXAM-  GENERAL:  Awake, NAD  HEENT: NCAT  CARDIOVASCULAR:  S1, S2  LUNGS: Decreased effort, upper airway congestion, no wheezes, rales  ABDOMEN:  soft, non tender, non distension, + bowel sounds  EXTREMITIES: no cyanosis / clubbing / edema.   NEURO: Alert and oriented to self, ALVAREZ x 4    I&O's Detail                          10.2   8.06  )-----------( 128      ( 05 Dec 2019 07:38 )             34.9     06 Dec 2019 09:12    145    |  101    |  26.0   ----------------------------<  90     3.9     |  34.0   |  0.64     Ca    9.6        06 Dec 2019 09:12  Phos  2.4       05 Dec 2019 07:38  Mg     2.2       05 Dec 2019 07:38        CAPILLARY BLOOD GLUCOSE    MEDICATIONS  (STANDING):  albuterol/ipratropium for Nebulization 3 milliLiter(s) Nebulizer every 6 hours  dextrose 5%. 1000 milliLiter(s) (50 mL/Hr) IV Continuous <Continuous>  guaifenesin/dextromethorphan  Syrup 10 milliLiter(s) Oral every 4 hours  heparin  Injectable 5000 Unit(s) SubCutaneous every 8 hours  memantine 5 milliGRAM(s) Oral two times a day  piperacillin/tazobactam IVPB.. 3.375 Gram(s) IV Intermittent every 8 hours  saccharomyces boulardii 250 milliGRAM(s) Oral two times a day  tamsulosin 0.4 milliGRAM(s) Oral at bedtime    MEDICATIONS  (PRN):  ALBUTerol    90 MICROgram(s) HFA Inhaler 2 Puff(s) Inhalation every 4 hours PRN Shortness of Breath and/or Wheezing      RADIOLOGY & ADDITIONAL TESTS:

## 2019-12-06 NOTE — PROGRESS NOTE ADULT - ASSESSMENT
AECOPD cleared  Chr hypercapnic resp failure secondary to above but marginally controlled with BIPAP/ST  Advanced dementia unchanged    Plan:  trilogy ordered to decrease re admissions and decrease mortality, Bipap ST has failed  home nebulized duoneb QID with albuterol prn  little else to offer  f/u with us post dc  recall prn

## 2019-12-06 NOTE — CHART NOTE - NSCHARTNOTEFT_GEN_A_CORE
Source: Patient [ ]  Family [x ]   other [x ] EMR    Per MD note:   79M with h/o dementia and COPD admitted for acute respiratory failure 2/2 COPD exacerbation and +RSV requiring bipap support eventually transitioned off to NC. Course further complicated by dysphagia, ?aspiration PNA and overnight with increased lethargy and AMS, ?benzo toxicity vs CO2 narcosis s/p flumazenil, placed back on bipap support this AM. Palliative   following.     Spoke with daughter in the hallway. Pt receiving care.   Reports poor PO intake given recent AMS. She reports he seems more clear today and hopeful he will eat more.     Current Diet: Diet, Dysphagia 1 Pureed-No Liquids (12-04-19 @ 14:58)      Patient reports [ ] nausea  [ ] vomiting [ ] diarrhea [ ] constipation  [ ]chewing problems [ ] swallowing issues  [ ] other: AMS    PO intake:  < 50% [x ]   50-75%  [ ]   %  [ ]  other :    Source for PO intake [ ] Patient [x ] family [ ] chart [ ] staff [ ] other        Current Weight:   12/2 66.8 kg  11/29 67.9 kg     % Weight Change > 60lb weight loss noted in 2 years PTA    Pertinent Medications: MEDICATIONS  (STANDING):  albuterol/ipratropium for Nebulization 3 milliLiter(s) Nebulizer every 6 hours  dextrose 5%. 1000 milliLiter(s) (50 mL/Hr) IV Continuous <Continuous>  guaifenesin/dextromethorphan  Syrup 10 milliLiter(s) Oral every 4 hours  heparin  Injectable 5000 Unit(s) SubCutaneous every 8 hours  memantine 5 milliGRAM(s) Oral two times a day  piperacillin/tazobactam IVPB.. 3.375 Gram(s) IV Intermittent every 8 hours  saccharomyces boulardii 250 milliGRAM(s) Oral two times a day    MEDICATIONS  (PRN):  ALBUTerol    90 MICROgram(s) HFA Inhaler 2 Puff(s) Inhalation every 4 hours PRN Shortness of Breath and/or Wheezing    Pertinent Labs: CBC Full  -  ( 05 Dec 2019 07:38 )  WBC Count : 8.06 K/uL  RBC Count : 3.40 M/uL  Hemoglobin : 10.2 g/dL  Hematocrit : 34.9 %  Platelet Count - Automated : 128 K/uL  Mean Cell Volume : 102.6 fl  Mean Cell Hemoglobin : 30.0 pg  Mean Cell Hemoglobin Concentration : 29.2 gm/dL  Auto Neutrophil # : x  Auto Lymphocyte # : x  Auto Monocyte # : x  Auto Eosinophil # : x  Auto Basophil # : x  Auto Neutrophil % : x  Auto Lymphocyte % : x  Auto Monocyte % : x  Auto Eosinophil % : x  Auto Basophil % : x    12-06 Na145 mmol/L Glu 90 mg/dL K+ 3.9 mmol/L Cr  0.64 mg/dL BUN 26.0 mg/dL<H> Phos n/a   Alb n/a   PAB n/a             Skin: none noted    Nutrition focused physical exam conducted - found signs of malnutrition [ ]absent [x ]present    Subcutaneous fat loss: [x ] Orbital fat pads region, [ x]Buccal fat region, [ ]Triceps region,  [ ]Ribs region    Muscle wasting: [ x]Temples region, [x ]Clavicle region, [ x]Shoulder region, [ ]Scapula region, [ ]Interosseous region,  [ ]thigh region, [ ]Calf region    Estimated Needs:   [x ] no change since previous assessment  [ ] recalculated:     Current Nutrition Diagnosis:    Malnutrition severe chronic, related to inadequate protein calorie intake in the setting of dementia, poor dentition and difficulty swallowing,  as evidenced by 30% weight loss x 2 years PO<75% est needs and NFPE. PO intake remains poor. no current weight to assess. Palliative involved in ongoing GOC conversations     Recommendations:   1) ensure Pudding TID  Monitoring and Evaluation:   [ x] PO intake [x ] Tolerance to diet prescription [X] Weights  [X] Follow up per protocol [X] Labs: Source: Patient [ ]  Family [x ]   other [x ] EMR    Per MD note:   79M with h/o dementia and COPD admitted for acute respiratory failure 2/2 COPD exacerbation and +RSV requiring bipap support eventually transitioned off to NC. Course further complicated by dysphagia, ?aspiration PNA and overnight with increased lethargy and AMS, ?benzo toxicity vs CO2 narcosis s/p flumazenil, placed back on bipap support this AM. Palliative   following.     Spoke with daughter in the hallway. Pt receiving care.   Reports poor PO intake given recent AMS.   had swallow eval this morning that he did not pass.      Current Diet: Diet, Dysphagia 1 Pureed-No Liquids (12-04-19 @ 14:58)      Patient reports [ ] nausea  [ ] vomiting [ ] diarrhea [ ] constipation  [ ]chewing problems [ ] swallowing issues  [ ] other: AMS    PO intake:  < 50% [x ]   50-75%  [ ]   %  [ ]  other :    Source for PO intake [ ] Patient [x ] family [ ] chart [ ] staff [ ] other        Current Weight:   12/2 66.8 kg  11/29 67.9 kg     % Weight Change > 60lb weight loss noted in 2 years PTA    Pertinent Medications: MEDICATIONS  (STANDING):  albuterol/ipratropium for Nebulization 3 milliLiter(s) Nebulizer every 6 hours  dextrose 5%. 1000 milliLiter(s) (50 mL/Hr) IV Continuous <Continuous>  guaifenesin/dextromethorphan  Syrup 10 milliLiter(s) Oral every 4 hours  heparin  Injectable 5000 Unit(s) SubCutaneous every 8 hours  memantine 5 milliGRAM(s) Oral two times a day  piperacillin/tazobactam IVPB.. 3.375 Gram(s) IV Intermittent every 8 hours  saccharomyces boulardii 250 milliGRAM(s) Oral two times a day    MEDICATIONS  (PRN):  ALBUTerol    90 MICROgram(s) HFA Inhaler 2 Puff(s) Inhalation every 4 hours PRN Shortness of Breath and/or Wheezing    Pertinent Labs: CBC Full  -  ( 05 Dec 2019 07:38 )  WBC Count : 8.06 K/uL  RBC Count : 3.40 M/uL  Hemoglobin : 10.2 g/dL  Hematocrit : 34.9 %  Platelet Count - Automated : 128 K/uL  Mean Cell Volume : 102.6 fl  Mean Cell Hemoglobin : 30.0 pg  Mean Cell Hemoglobin Concentration : 29.2 gm/dL  Auto Neutrophil # : x  Auto Lymphocyte # : x  Auto Monocyte # : x  Auto Eosinophil # : x  Auto Basophil # : x  Auto Neutrophil % : x  Auto Lymphocyte % : x  Auto Monocyte % : x  Auto Eosinophil % : x  Auto Basophil % : x    12-06 Na145 mmol/L Glu 90 mg/dL K+ 3.9 mmol/L Cr  0.64 mg/dL BUN 26.0 mg/dL<H> Phos n/a   Alb n/a   PAB n/a             Skin: none noted    Nutrition focused physical exam conducted - found signs of malnutrition [ ]absent [x ]present    Subcutaneous fat loss: [x ] Orbital fat pads region, [ x]Buccal fat region, [ ]Triceps region,  [ ]Ribs region    Muscle wasting: [ x]Temples region, [x ]Clavicle region, [ x]Shoulder region, [ ]Scapula region, [ ]Interosseous region,  [ ]thigh region, [ ]Calf region    Estimated Needs:   [x ] no change since previous assessment  [ ] recalculated:     Current Nutrition Diagnosis:    Malnutrition severe chronic, related to inadequate protein calorie intake in the setting of dementia, poor dentition and difficulty swallowing,  as evidenced by 30% weight loss x 2 years PO<75% est needs and NFPE. PO intake remains poor. Failed swallow eval this am, possible pleasure feeds per MD discretion/ pt / family wishes noted.    no current weight to assess. Palliative involved in ongoing GOC conversations     Recommendations:   1) ensure Pudding TID if PO diet to continue   2) reconsult if TF recs warranted   3) continue to honor pt / family wishes    Monitoring and Evaluation:   [ x] PO intake [x ] Tolerance to diet prescription [X] Weights  [X] Follow up per protocol [X] Labs:

## 2019-12-06 NOTE — PROGRESS NOTE ADULT - ASSESSMENT
79M with h/o dementia and COPD admitted for acute respiratory failure 2/2 COPD exacerbation and +RSV requiring bipap support eventually transitioned off to NC. Course further complicated by dysphagia, presumed aspiration PNA and worsening lethargy and AMS, ?benzo toxicity vs CO2 narcosis s/p flumazenil, placed back on bipap 12/5. Now off bipap to NC. Mentation improving.     PLAN    Acute on Chronic Respiratory Failure with Hypercapnia  Acute COPD exacerbation/Positive RSV URI/Dyspnea  - doing well off bipap on NC at 4L  - on antibiotic with Zosyn, nebs  - pulm following, ordering trilogy for home     Alzheimer Dementia   - per chart baseline confusion  - dependent for most ADLs, has aide support 10hrs per day [facility] and has visiting RN/MD service    Dysphagia  - again failed S/S eval this AM, remains NPO, aspiration precaution  - will need to explored NGT and alternative options ex. feeding tube if no improvement    Urinary Retention  - pending bladder scan  - may need diaz if sx persist    Palliative Care Encounter  Unit NP Precious Chavez and palliative team met with pt/dtr Brii, see [facility] note. Reviewed hospital course including most recent updated with respiratory status and progressive dysphagia. Alternative options for nutrition was explored including 1) temporary NGT, 2) long term feeding tube ex PEG, 3) pleasure feeds on comfort measures and hospice support. Per dtr, pt has vocalized in past he does NOT want a feeding tube.     At this time, dtr plans to speak with rest of large family that are actively involved before making any decisions. We will continue current medical mgnt, repeat S/S tomorrow to monitor any improvement now that he is more awake and alert. Dtr understands that we need to see consistency in ability to safely eat vs isolated episodes. Psychosocial support provided. Will support and follow along.     Brii will bring in copy of HCP at home for our records.

## 2019-12-06 NOTE — PROGRESS NOTE ADULT - ASSESSMENT
80 y/o male with PMH of dementia, COPD (not on home O2) was brought to the ED due to difficulty breathing, SaO2 noted pCO2 60s, admitted for acute hypoxic and hypercapnic respiratory failure requiring bipap support. RSV +. Patient was downgraded to Medical floor and maintained on nasal canula, course complicated by weak cough and dysphagia. Patient now hypercapnic requiring benzo reversal and BIPAP    9. acute hypercapnic encephalopathy  hypoventilation sec to benzos  reversed  BiPAP applied  Cont BiPAP through night until AM      1. s/p Acute hypercapnic respiratory failure due to COPD exacerbation likely 2/2 RSV, with recurrence of hypercapnia requiring benzo reversal and BIPAP  Nocturnal BIPAP and PRN  Trilogy to be arranged for discharge  supplemental oxygen  Continue Duo neb,  Mucomyst  chest PT  Suction prn  steroid taper    2. CXR with no new infiltrate, presumed Aspiration pneumonitis given poor cognition, mental status and weak cough reflex baseline  ruled out PNA  On Zosyn, last day 12/8  continue nebulizer    3. Urinary retention  Difficult diaz insertion,  consulted  Start flomax  Avoid constipation    4. Dysphagia   Has been followed by SLP, diet unable to be advanced as he is continuing to pocket food and at risk for aspiration. SLP to continue to follow, family to consider alternate method of nutrition vs pleasure feeds with transition to comfort    5. Hypernatremia- resolved  On IV Dextrose  50 cc/hr monitor BMP      6. s/p COPD exacerbation   Plan as above     7. RSV   Tylenol PRN     8. Hypothermia   Low body temp occasionally requiring bear hugger, temperature now normal. Nursing instructed to perform rectal temps to confirm any low temperature reading taken orally.   Appreciate Endocrine input: Low cortisol level due to exogenous corticosteroids. No chance of adrenal insufficiency as 40 mg daily of methylprednisolone 10 times the normal adrenal maintenance dose.     9. Dementia with agitation   His mental status is at baseline per family   Continue Memantine 5mg bid  xanax dc'd,   Family strongly refusing any Benzos or Neuroleptics  to use 1:1 with or without restraints if needed    # Supportive   DVT prophylaxis: heparin sc     Dispo: Palliative met with daughter who is HCP this am. Family to discuss alternative nutrition  vs pleasure feeds with eventual transition  to comfort. 80 y/o male with PMH of dementia, COPD (not on home O2) was brought to the ED due to difficulty breathing, SaO2 noted pCO2 60s, admitted for acute hypoxic and hypercapnic respiratory failure requiring bipap support. RSV +. Patient was downgraded to Medical floor and maintained on nasal canula, course complicated by weak cough and dysphagia. Patient  hypercapnic requiring benzo reversal and BIPAP yesterday. Improved alertness and cognition today.             1. s/p Acute hypercapnic respiratory failure due to COPD exacerbation likely 2/2 RSV, with recurrence of hypercapnia requiring benzo reversal and BIPAP on 12/5 which has now resolved  Nocturnal BIPAP and PRN  Trilogy to be arranged for discharge  supplemental oxygen  Continue Duo neb,  Mucomyst  chest PT  Suction prn  steroid taper    2. CXR with no new infiltrate, presumed Aspiration pneumonitis given poor cognition, mental status and weak cough reflex baseline  ruled out PNA  On Zosyn, last day 12/8  continue nebulizer    3. Urinary retention  Difficult diaz insertion,  consulted  Start flomax  Avoid constipation    4. Dysphagia   Has been followed by SLP, diet  advanced to pureed with no liquids. Patient needs to be awake, alert, and cognitively aware of food in mouth and need to swallow, otherwise patient cannot be fed. Family at bedside and understand need for above. Patient currently appears to tolerate feeds in afternoon but is not capable in the am. Family also understands that he is still high risk of aspiration. Aspiration precautions and one on one feeding required.     5. Hypernatremia- resolved  On IV Dextrose  50 cc/hr monitor BMP      6. s/p COPD exacerbation   Plan as above     7. RSV   Tylenol PRN     8. Hypothermia   Low body temp occasionally requiring bear hugger, temperature now normal. Nursing instructed to perform rectal temps to confirm any low temperature reading taken orally.   Appreciate Endocrine input: Low cortisol level due to exogenous corticosteroids. No chance of adrenal insufficiency as 40 mg daily of methylprednisolone 10 times the normal adrenal maintenance dose.     9. Dementia with agitation   His mental status is at baseline per family   Continue Memantine 5mg bid  xanax dc'd,   Family strongly refusing any Benzos or Neuroleptics  to use 1:1 with or without restraints if needed    # Supportive   DVT prophylaxis: heparin sc     Dispo: Palliative met with daughter who is HCP this am. Family to discuss possible transition to comfort 80 y/o male with PMH of dementia, COPD (not on home O2) was brought to the ED due to difficulty breathing, SaO2 noted pCO2 60s, admitted for acute hypoxic and hypercapnic respiratory failure requiring bipap support. RSV +. Patient was downgraded to Medical floor and maintained on nasal canula, course complicated by weak cough and dysphagia. He also has waxing and waning mentum, some days more alert and some days somnolent. On 12/3/19 when he had waning mentum and congestive cough new imaging done read as rt LL possible infiltrate. For which Zosyn was started. Patient  hypercapnic on 12/5/19 requiring benzo reversal and BIPAP. Improved alertness and cognition 12/6/19.     1. s/p Acute hypercapnic respiratory failure due to COPD exacerbation likely 2/2 RSV, with recurrence of hypercapnia requiring benzo reversal and BIPAP on 12/5 which has now resolved  Nocturnal BIPAP and PRN  Nocturnally agitation/ confusion noted, for which family is refusing anxiolytics and to use 1:1 watch  Trilogy to be arranged for discharge per Pulm  supplemental oxygen  Continue Duo neb, Mucomyst  chest PT  Suction frequently strictly  steroid taper to PO Prednisone now    2. CXR with no new infiltrate, presumed Aspiration pneumonitis given poor cognition, mental status and weak cough reflex baseline  ruled out PNA  On Zosyn, last day 12/8  continue nebulizer  frequent suctioning of throat required sec to weak cough reflex    3. Urinary retention  Difficult daiz insertion,  consulted  Coudey catheter inserted  Start flomax  Avoid constipation    4. Dysphagia   Has been followed by SLP- at different times of the day different outcomes. diet  advanced to pureed with no liquids. Patient needs to be awake, alert, and cognitively aware of food in mouth and need to swallow, otherwise patient cannot be fed. Cognition need to be checked with each bolus. Family at bedside and understand need for above. Patient currently appears to tolerate feeds in afternoon but is not capable in the am. Family also understands that he is still high risk of aspiration. Aspiration precautions and one on one feeding required.     5. s/p Hypernatremia- resolved  On IV Dextrose  50 cc/hr monitor BMP    6. s/p COPD exacerbation   Plan as above     7. RSV   Tylenol PRN     8. s/p Hypothermia - resolved  Low body temp occasionally requiring bear hugger, temperature now normal. Nursing instructed to perform rectal temps to confirm any low temperature reading taken orally.   Appreciate Endocrine input: Low cortisol level due to exogenous corticosteroids. No chance of adrenal insufficiency as 40 mg daily of methylprednisolone 10 times the normal adrenal maintenance dose.     9. Dementia with agitation   His mental status is at baseline per family   Continue Memantine 5mg bid  xanax d/c'd,   Family strongly refusing any Benzos or Neuroleptics  to use 1:1 with or without restraints if needed    # Supportive   DVT prophylaxis: heparin sc     Dispo: Palliative met with daughter who is HCP this am. Family to discuss possible transition to comfort. Hospice eval ordered. 80 y/o male with PMH of dementia, COPD (not on home O2) was brought to the ED due to difficulty breathing, SaO2 noted pCO2 60s, admitted for acute hypoxic and hypercapnic respiratory failure requiring bipap support. RSV +. Patient was downgraded to Medical floor and maintained on nasal canula, course complicated by weak cough and dysphagia. He also has waxing and waning mentum, some days more alert and some days somnolent. On 12/3/19 when he had waning mentum and congestive cough new imaging done read as rt LL possible infiltrate. For which Zosyn was started. Patient  hypercapnic on 12/5/19 requiring benzo reversal and BIPAP. Improved alertness and cognition 12/6/19.     1. s/p Acute hypercapnic respiratory failure due to COPD exacerbation likely 2/2 RSV, with recurrence of hypercapnia requiring benzo reversal and BIPAP on 12/5 which has now resolved  Nocturnal BIPAP and PRN  Nocturnally agitation/ confusion noted, for which family is refusing anxiolytics and to use 1:1 watch  Trilogy to be arranged for discharge per Pulm  supplemental oxygen  Continue Duo neb, Mucomyst  chest PT  Suction frequently strictly  steroid taper to PO Prednisone now    10. Constipation on imaging  can be the cause of urinary retention  lactulose enemas    2. CXR with no new infiltrate, presumed Aspiration pneumonitis given poor cognition, mental status and weak cough reflex baseline  ruled out PNA  On Zosyn, last day 12/8  continue nebulizer  frequent suctioning of throat required sec to weak cough reflex    3. Urinary retention  Difficult diaz insertion,  consulted  Coudey catheter inserted  Start flomax  Avoid constipation    4. Dysphagia   Has been followed by SLP- at different times of the day different outcomes. diet  advanced to pureed with no liquids. Patient needs to be awake, alert, and cognitively aware of food in mouth and need to swallow, otherwise patient cannot be fed. Cognition need to be checked with each bolus. Family at bedside and understand need for above. Patient currently appears to tolerate feeds in afternoon but is not capable in the am. Family also understands that he is still high risk of aspiration. Aspiration precautions and one on one feeding required.     5. s/p Hypernatremia- resolved  On IV Dextrose  50 cc/hr monitor BMP    6. s/p COPD exacerbation   Plan as above     7. RSV   Tylenol PRN     8. s/p Hypothermia - resolved  Low body temp occasionally requiring bear hugger, temperature now normal. Nursing instructed to perform rectal temps to confirm any low temperature reading taken orally.   Appreciate Endocrine input: Low cortisol level due to exogenous corticosteroids. No chance of adrenal insufficiency as 40 mg daily of methylprednisolone 10 times the normal adrenal maintenance dose.     9. Dementia with agitation   His mental status is at baseline per family   Continue Memantine 5mg bid  xanax d/c'd,   Family strongly refusing any Benzos or Neuroleptics  to use 1:1 with or without restraints if needed    # Supportive   DVT prophylaxis: heparin sc     Dispo: Palliative met with daughter who is HCP this am. Family to discuss possible transition to comfort. Hospice eval ordered.

## 2019-12-06 NOTE — PROCEDURE NOTE - NSURITECHNIQUE_GU_A_CORE
Sterile gloves were worn for the duration of the procedure/A sterile drape was used to cover all adjacent areas/The site was cleaned with soap/water and sterile solution (betadine)/The catheter was secured with a securement device (e.g. StatLock)/All applicable medical record documentation is completed/Proper hand hygiene was performed/The collection bag is below the level of the patient and urinary bladder/The catheter was appropriately lubricated

## 2019-12-06 NOTE — PROGRESS NOTE ADULT - SUBJECTIVE AND OBJECTIVE BOX
PULMONARY PROGRESS NOTE      RAJ SCHULER-[id number]    Patient is a 79y old  Male who presents with a chief complaint of COPD exacerbation (05 Dec 2019 17:14)      INTERVAL HPI/OVERNIGHT EVENTS:  Little long term memory  comfortable on NCO  Tolerating BIPAP with rate  Trilogy ordered    MEDICATIONS  (STANDING):  albuterol/ipratropium for Nebulization 3 milliLiter(s) Nebulizer every 6 hours  dextrose 5%. 1000 milliLiter(s) (50 mL/Hr) IV Continuous <Continuous>  guaifenesin/dextromethorphan  Syrup 10 milliLiter(s) Oral every 4 hours  heparin  Injectable 5000 Unit(s) SubCutaneous every 8 hours  memantine 5 milliGRAM(s) Oral two times a day  piperacillin/tazobactam IVPB.. 3.375 Gram(s) IV Intermittent every 8 hours  saccharomyces boulardii 250 milliGRAM(s) Oral two times a day  tamsulosin 0.4 milliGRAM(s) Oral at bedtime      MEDICATIONS  (PRN):  ALBUTerol    90 MICROgram(s) HFA Inhaler 2 Puff(s) Inhalation every 4 hours PRN Shortness of Breath and/or Wheezing      Allergies    No Known Allergies    Intolerances    Ativan (Drowsiness (Severe))  Xanax (Drowsiness (Severe))      PAST MEDICAL & SURGICAL HISTORY:  HLD (hyperlipidemia)  COPD (chronic obstructive pulmonary disease)  Dementia  No significant past surgical history      SOCIAL HISTORY  Smoking History:       REVIEW OF SYSTEMS:    CONSTITUTIONAL:  No distress    HEENT:  Eyes:  No diplopia or blurred vision. ENT:  No earache, sore throat or runny nose.    CARDIOVASCULAR:  No pressure, squeezing, tightness, heaviness or aching about the chest; no palpitations.    RESPIRATORY:  above    GASTROINTESTINAL:  No nausea, vomiting or diarrhea.    GENITOURINARY:  No dysuria, frequency or urgency.    NEUROLOGIC:  No paresthesias, fasciculations, seizures or weakness.    Extremities: No cyanosis, clubbing or edema    PSYCHIATRIC:  No disorder of thought or mood.    Vital Signs Last 24 Hrs  T(C): 36.4 (06 Dec 2019 08:31), Max: 36.4 (05 Dec 2019 23:54)  T(F): 97.6 (06 Dec 2019 08:31), Max: 97.6 (05 Dec 2019 23:54)  HR: 80 (06 Dec 2019 08:45) (75 - 84)  BP: 145/78 (06 Dec 2019 08:31) (101/66 - 145/78)  BP(mean): --  RR: 18 (06 Dec 2019 08:31) (18 - 18)  SpO2: 98% (06 Dec 2019 08:31) (96% - 99%)    PHYSICAL EXAMINATION:    GENERAL: The patient is awake and alert in no apparent distress.     HEENT: Head is normocephalic and atraumatic. Extraocular muscles are intact. Mucous membranes are moist.    NECK: Supple.    LUNGS: Clear to auscultation without wheezing, rales or rhonchi; respirations unlabored    HEART: Regular rate and rhythm without murmur.    ABDOMEN: Soft, nontender, and nondistended.      EXTREMITIES: Without any cyanosis, clubbing, rash, lesions or edema.    NEUROLOGIC: Grossly intact.    LABS:                        10.2   8.06  )-----------( 128      ( 05 Dec 2019 07:38 )             34.9     12-06    145  |  101  |  26.0<H>  ----------------------------<  90  3.9   |  34.0<H>  |  0.64    Ca    9.6      06 Dec 2019 09:12  Phos  2.4     12-05  Mg     2.2     12-05          ABG - ( 05 Dec 2019 13:57 )  pH, Arterial: 7.40  pH, Blood: x     /  pCO2: 68    /  pO2: 73    / HCO3: 39    / Base Excess: 14.9  /  SaO2: 96                              MICROBIOLOGY:    RADIOLOGY & ADDITIONAL STUDIES:  < from: Xray Chest 1 View- PORTABLE-Routine (12.04.19 @ 07:11) >     EXAM:  XR CHEST PORTABLE ROUTINE 1V                          PROCEDURE DATE:  12/04/2019          INTERPRETATION:  CHEST AP PORTABLE:    History: Aspiration pneumonia. Difficulty breathing..    Date and time of exam: 12/4/2019 6:06 AM.    Comparison: 12/3/2019 9:21 AM.    Technique: A single AP view of the chest was obtained.    Findings:  The lungs are clear. The heart and mediastinum are unremarkable.  No   hilar abnormality is seen.  There is no evidence of pleural effusion. The   visualized osseous structures demonstrate degenerative changes in the   spine.    Impression:    No evidence of acute cardiopulmonary disease.                  JANELLE MERA M.D., ATTENDING RADIOLOGIST  This document has been electronically signed. Dec  4 2019  9:15AM    < end of copied text >  All films reviewed on PACS

## 2019-12-07 DIAGNOSIS — J44.1 CHRONIC OBSTRUCTIVE PULMONARY DISEASE WITH (ACUTE) EXACERBATION: ICD-10-CM

## 2019-12-07 DIAGNOSIS — J69.0 PNEUMONITIS DUE TO INHALATION OF FOOD AND VOMIT: ICD-10-CM

## 2019-12-07 DIAGNOSIS — R33.9 RETENTION OF URINE, UNSPECIFIED: ICD-10-CM

## 2019-12-07 PROCEDURE — 99233 SBSQ HOSP IP/OBS HIGH 50: CPT

## 2019-12-07 RX ADMIN — SODIUM CHLORIDE 50 MILLILITER(S): 9 INJECTION, SOLUTION INTRAVENOUS at 18:05

## 2019-12-07 RX ADMIN — Medication 3 MILLILITER(S): at 21:31

## 2019-12-07 RX ADMIN — Medication 40 MILLIGRAM(S): at 05:11

## 2019-12-07 RX ADMIN — Medication 10 MILLILITER(S): at 05:12

## 2019-12-07 RX ADMIN — PIPERACILLIN AND TAZOBACTAM 25 GRAM(S): 4; .5 INJECTION, POWDER, LYOPHILIZED, FOR SOLUTION INTRAVENOUS at 16:40

## 2019-12-07 RX ADMIN — Medication 10 MILLILITER(S): at 13:46

## 2019-12-07 RX ADMIN — Medication 3 MILLILITER(S): at 15:45

## 2019-12-07 RX ADMIN — SODIUM CHLORIDE 50 MILLILITER(S): 9 INJECTION, SOLUTION INTRAVENOUS at 07:21

## 2019-12-07 RX ADMIN — HEPARIN SODIUM 5000 UNIT(S): 5000 INJECTION INTRAVENOUS; SUBCUTANEOUS at 13:46

## 2019-12-07 RX ADMIN — Medication 250 MILLIGRAM(S): at 17:31

## 2019-12-07 RX ADMIN — Medication 10 MILLILITER(S): at 12:05

## 2019-12-07 RX ADMIN — HEPARIN SODIUM 5000 UNIT(S): 5000 INJECTION INTRAVENOUS; SUBCUTANEOUS at 22:02

## 2019-12-07 RX ADMIN — Medication 10 MILLILITER(S): at 02:10

## 2019-12-07 RX ADMIN — Medication 3 MILLILITER(S): at 03:51

## 2019-12-07 RX ADMIN — Medication 10 MILLILITER(S): at 17:31

## 2019-12-07 RX ADMIN — MEMANTINE HYDROCHLORIDE 5 MILLIGRAM(S): 10 TABLET ORAL at 17:31

## 2019-12-07 RX ADMIN — Medication 10 MILLILITER(S): at 22:08

## 2019-12-07 RX ADMIN — MEMANTINE HYDROCHLORIDE 5 MILLIGRAM(S): 10 TABLET ORAL at 05:11

## 2019-12-07 RX ADMIN — HEPARIN SODIUM 5000 UNIT(S): 5000 INJECTION INTRAVENOUS; SUBCUTANEOUS at 05:10

## 2019-12-07 RX ADMIN — PIPERACILLIN AND TAZOBACTAM 25 GRAM(S): 4; .5 INJECTION, POWDER, LYOPHILIZED, FOR SOLUTION INTRAVENOUS at 22:02

## 2019-12-07 RX ADMIN — TAMSULOSIN HYDROCHLORIDE 0.4 MILLIGRAM(S): 0.4 CAPSULE ORAL at 22:08

## 2019-12-07 RX ADMIN — Medication 250 MILLIGRAM(S): at 05:11

## 2019-12-07 RX ADMIN — PIPERACILLIN AND TAZOBACTAM 25 GRAM(S): 4; .5 INJECTION, POWDER, LYOPHILIZED, FOR SOLUTION INTRAVENOUS at 05:11

## 2019-12-07 RX ADMIN — Medication 3 MILLILITER(S): at 09:05

## 2019-12-07 NOTE — PROCEDURE NOTE - NSPROCDETAILS_GEN_ALL_CORE
sterile technique, indwelling urinary device inserted
blood seen on insertion/secured in place/sterile technique, catheter placed/flushes easily/dressing applied/location identified, draped/prepped, sterile technique used

## 2019-12-07 NOTE — PROGRESS NOTE ADULT - ASSESSMENT
79 yr old male with hyperlipidemia, dementia, COPD admitted with complains shortness of breath, wheezing and cough. Noted to be in acute hypercapnic and hypoxic respiratory failure, tested positive for RSV. He was admitted to step down for further management. 79 yr old male with hyperlipidemia, dementia, COPD admitted with complains shortness of breath, wheezing and cough. Noted to be in acute hypercapnic and hypoxic respiratory failure, tested positive for RSV. He was admitted to step down for further management with steroids, nebs. He required BiPAP for hypercapnic respiratory failure. Goals of care were discussed with family, they wished aggressive measures and full code. He continued to have worsening hypercapnic respiratory failure and was not able to be weaned off BiPAP and ICU was consulted. He was not deemed to require ICU admission. Multiple goals of care conversations were held with family, they initially wanted full code but later agreed to DNR/DNI. Pulmonary evaluation was requested, trilogy was advised on discharge. His diet was advanced per speech and swallow. He had episodes of hypothermia, endocrine was consulted, adrenal insufficiency was ruled out. He developed worsening secretions, concern for aspiration pneumonia and hence Zosyn was initiated. Lo was placed on 12/6 for retention. Palliative care was consulted, after discussion with family, hospice evaluation was requested.

## 2019-12-07 NOTE — PROGRESS NOTE ADULT - SUBJECTIVE AND OBJECTIVE BOX
INTERVAL HPI/OVERNIGHT EVENTS:      CC: aspiration pneumonia, dementia, urinary retention      Chart and course reviewed. Lo placed yesterday for retention. Was ripping IV lines this am, CO was initiated    Vital Signs Last 24 Hrs  T(C): 36.4 (07 Dec 2019 07:01), Max: 36.4 (07 Dec 2019 00:28)  T(F): 97.5 (07 Dec 2019 07:01), Max: 97.5 (07 Dec 2019 00:28)  HR: 76 (07 Dec 2019 09:05) (76 - 86)  BP: 118/66 (07 Dec 2019 07:01) (118/66 - 121/73)  BP(mean): --  RR: 18 (07 Dec 2019 07:01) (18 - 18)  SpO2: 94% (07 Dec 2019 09:05) (91% - 98%)    PHYSICAL EXAM:    GENERAL: Not in distress  CHEST/LUNG: b/l air entry, coarse  HEART: Regular   ABDOMEN: Soft, BS+  EXTREMITIES: no edema, tenderness.    MEDICATIONS  (STANDING):  albuterol/ipratropium for Nebulization 3 milliLiter(s) Nebulizer every 6 hours  dextrose 5%. 1000 milliLiter(s) (50 mL/Hr) IV Continuous <Continuous>  guaifenesin/dextromethorphan  Syrup 10 milliLiter(s) Oral every 4 hours  heparin  Injectable 5000 Unit(s) SubCutaneous every 8 hours  lactulose Retention Enema 200 Gram(s) Rectal daily  memantine 5 milliGRAM(s) Oral two times a day  piperacillin/tazobactam IVPB.. 3.375 Gram(s) IV Intermittent every 8 hours  predniSONE   Tablet 40 milliGRAM(s) Oral daily  saccharomyces boulardii 250 milliGRAM(s) Oral two times a day  tamsulosin 0.4 milliGRAM(s) Oral at bedtime    MEDICATIONS  (PRN):  ALBUTerol    90 MICROgram(s) HFA Inhaler 2 Puff(s) Inhalation every 4 hours PRN Shortness of Breath and/or Wheezing      Allergies    No Known Allergies    Intolerances    Ativan (Drowsiness (Severe))  Seroquel (Other (Severe))  Xanax (Drowsiness (Severe))        LABS:      12-06    145  |  101  |  26.0<H>  ----------------------------<  90  3.9   |  34.0<H>  |  0.64    Ca    9.6      06 Dec 2019 09:12            RADIOLOGY & ADDITIONAL TESTS: INTERVAL HPI/OVERNIGHT EVENTS:      CC: aspiration pneumonia, dementia, urinary retention, COPD exacerbation      Chart and course reviewed. Lo placed yesterday for retention. Was ripping IV lines this am, CO was initiated    Vital Signs Last 24 Hrs  T(C): 36.4 (07 Dec 2019 07:01), Max: 36.4 (07 Dec 2019 00:28)  T(F): 97.5 (07 Dec 2019 07:01), Max: 97.5 (07 Dec 2019 00:28)  HR: 76 (07 Dec 2019 09:05) (76 - 86)  BP: 118/66 (07 Dec 2019 07:01) (118/66 - 121/73)  BP(mean): --  RR: 18 (07 Dec 2019 07:01) (18 - 18)  SpO2: 94% (07 Dec 2019 09:05) (91% - 98%)    PHYSICAL EXAM:    GENERAL: Not in distress  CHEST/LUNG: b/l air entry, coarse  HEART: Regular   ABDOMEN: Soft, BS+  EXTREMITIES: no edema, tenderness.    MEDICATIONS  (STANDING):  albuterol/ipratropium for Nebulization 3 milliLiter(s) Nebulizer every 6 hours  dextrose 5%. 1000 milliLiter(s) (50 mL/Hr) IV Continuous <Continuous>  guaifenesin/dextromethorphan  Syrup 10 milliLiter(s) Oral every 4 hours  heparin  Injectable 5000 Unit(s) SubCutaneous every 8 hours  lactulose Retention Enema 200 Gram(s) Rectal daily  memantine 5 milliGRAM(s) Oral two times a day  piperacillin/tazobactam IVPB.. 3.375 Gram(s) IV Intermittent every 8 hours  predniSONE   Tablet 40 milliGRAM(s) Oral daily  saccharomyces boulardii 250 milliGRAM(s) Oral two times a day  tamsulosin 0.4 milliGRAM(s) Oral at bedtime    MEDICATIONS  (PRN):  ALBUTerol    90 MICROgram(s) HFA Inhaler 2 Puff(s) Inhalation every 4 hours PRN Shortness of Breath and/or Wheezing      Allergies    No Known Allergies    Intolerances    Ativan (Drowsiness (Severe))  Seroquel (Other (Severe))  Xanax (Drowsiness (Severe))        LABS:      12-06    145  |  101  |  26.0<H>  ----------------------------<  90  3.9   |  34.0<H>  |  0.64    Ca    9.6      06 Dec 2019 09:12            RADIOLOGY & ADDITIONAL TESTS:

## 2019-12-08 LAB
ANION GAP SERPL CALC-SCNC: 11 MMOL/L — SIGNIFICANT CHANGE UP (ref 5–17)
BUN SERPL-MCNC: 17 MG/DL — SIGNIFICANT CHANGE UP (ref 8–20)
CALCIUM SERPL-MCNC: 9.9 MG/DL — SIGNIFICANT CHANGE UP (ref 8.6–10.2)
CHLORIDE SERPL-SCNC: 101 MMOL/L — SIGNIFICANT CHANGE UP (ref 98–107)
CO2 SERPL-SCNC: 34 MMOL/L — HIGH (ref 22–29)
CREAT SERPL-MCNC: 0.77 MG/DL — SIGNIFICANT CHANGE UP (ref 0.5–1.3)
CULTURE RESULTS: SIGNIFICANT CHANGE UP
GLUCOSE SERPL-MCNC: 189 MG/DL — HIGH (ref 70–115)
HCT VFR BLD CALC: 32.7 % — LOW (ref 39–50)
HGB BLD-MCNC: 9.7 G/DL — LOW (ref 13–17)
MCHC RBC-ENTMCNC: 29.7 GM/DL — LOW (ref 32–36)
MCHC RBC-ENTMCNC: 30.1 PG — SIGNIFICANT CHANGE UP (ref 27–34)
MCV RBC AUTO: 101.6 FL — HIGH (ref 80–100)
PLATELET # BLD AUTO: 143 K/UL — LOW (ref 150–400)
POTASSIUM SERPL-MCNC: 3.7 MMOL/L — SIGNIFICANT CHANGE UP (ref 3.5–5.3)
POTASSIUM SERPL-SCNC: 3.7 MMOL/L — SIGNIFICANT CHANGE UP (ref 3.5–5.3)
RBC # BLD: 3.22 M/UL — LOW (ref 4.2–5.8)
RBC # FLD: 15.9 % — HIGH (ref 10.3–14.5)
SODIUM SERPL-SCNC: 146 MMOL/L — HIGH (ref 135–145)
SPECIMEN SOURCE: SIGNIFICANT CHANGE UP
WBC # BLD: 7.76 K/UL — SIGNIFICANT CHANGE UP (ref 3.8–10.5)
WBC # FLD AUTO: 7.76 K/UL — SIGNIFICANT CHANGE UP (ref 3.8–10.5)

## 2019-12-08 PROCEDURE — 99232 SBSQ HOSP IP/OBS MODERATE 35: CPT

## 2019-12-08 RX ADMIN — Medication 10 MILLILITER(S): at 05:22

## 2019-12-08 RX ADMIN — Medication 250 MILLIGRAM(S): at 18:44

## 2019-12-08 RX ADMIN — Medication 10 MILLILITER(S): at 12:12

## 2019-12-08 RX ADMIN — HEPARIN SODIUM 5000 UNIT(S): 5000 INJECTION INTRAVENOUS; SUBCUTANEOUS at 05:23

## 2019-12-08 RX ADMIN — LACTULOSE 200 GRAM(S): 10 SOLUTION ORAL at 15:22

## 2019-12-08 RX ADMIN — TAMSULOSIN HYDROCHLORIDE 0.4 MILLIGRAM(S): 0.4 CAPSULE ORAL at 21:25

## 2019-12-08 RX ADMIN — Medication 10 MILLILITER(S): at 15:31

## 2019-12-08 RX ADMIN — PIPERACILLIN AND TAZOBACTAM 25 GRAM(S): 4; .5 INJECTION, POWDER, LYOPHILIZED, FOR SOLUTION INTRAVENOUS at 05:23

## 2019-12-08 RX ADMIN — Medication 250 MILLIGRAM(S): at 05:25

## 2019-12-08 RX ADMIN — Medication 10 MILLILITER(S): at 21:25

## 2019-12-08 RX ADMIN — MEMANTINE HYDROCHLORIDE 5 MILLIGRAM(S): 10 TABLET ORAL at 18:44

## 2019-12-08 RX ADMIN — HEPARIN SODIUM 5000 UNIT(S): 5000 INJECTION INTRAVENOUS; SUBCUTANEOUS at 15:31

## 2019-12-08 RX ADMIN — Medication 3 MILLILITER(S): at 02:56

## 2019-12-08 RX ADMIN — MEMANTINE HYDROCHLORIDE 5 MILLIGRAM(S): 10 TABLET ORAL at 05:23

## 2019-12-08 RX ADMIN — SODIUM CHLORIDE 50 MILLILITER(S): 9 INJECTION, SOLUTION INTRAVENOUS at 21:25

## 2019-12-08 RX ADMIN — Medication 3 MILLILITER(S): at 20:13

## 2019-12-08 RX ADMIN — HEPARIN SODIUM 5000 UNIT(S): 5000 INJECTION INTRAVENOUS; SUBCUTANEOUS at 21:25

## 2019-12-08 RX ADMIN — Medication 3 MILLILITER(S): at 15:00

## 2019-12-08 RX ADMIN — Medication 10 MILLILITER(S): at 18:44

## 2019-12-08 RX ADMIN — Medication 40 MILLIGRAM(S): at 05:24

## 2019-12-08 RX ADMIN — Medication 3 MILLILITER(S): at 08:25

## 2019-12-08 NOTE — PROGRESS NOTE ADULT - ASSESSMENT
79 yr old male with hyperlipidemia, dementia, COPD admitted with complains shortness of breath, wheezing and cough. Noted to be in acute hypercapnic and hypoxic respiratory failure, tested positive for RSV. He was admitted to step down for further management with steroids, nebs. He required BiPAP for hypercapnic respiratory failure. Goals of care were discussed with family, they wished aggressive measures and full code. He continued to have worsening hypercapnic respiratory failure and was not able to be weaned off BiPAP and ICU was consulted. He was not deemed to require ICU admission. Multiple goals of care conversations were held with family, they initially wanted full code but later agreed to DNR/DNI. Pulmonary evaluation was requested, trilogy was advised on discharge. His diet was advanced per speech and swallow. He had episodes of hypothermia, endocrine was consulted, adrenal insufficiency was ruled out. He developed worsening secretions, concern for aspiration pneumonia and hence Zosyn was initiated. Lo was placed on 12/6 for retention. Palliative care was consulted, after discussion with family, hospice evaluation was requested.

## 2019-12-08 NOTE — PROGRESS NOTE ADULT - SUBJECTIVE AND OBJECTIVE BOX
INTERVAL HPI/OVERNIGHT EVENTS:    CC: aspiration pneumonia, dementia, urinary retention, COPD exacerbation    No overnight events, CO aide at bedside, periods of restlessness.    Vital Signs Last 24 Hrs  T(C): 36.6 (07 Dec 2019 16:13), Max: 36.6 (07 Dec 2019 16:13)  T(F): 97.8 (07 Dec 2019 16:13), Max: 97.8 (07 Dec 2019 16:13)  HR: 80 (08 Dec 2019 08:46) (76 - 92)  BP: 99/65 (08 Dec 2019 00:49) (99/65 - 119/61)  BP(mean): --  RR: 18 (08 Dec 2019 00:49) (18 - 18)  SpO2: 97% (08 Dec 2019 03:42) (92% - 99%)    PHYSICAL EXAM:    GENERAL: Not in distress, drowsy but arousable, confused  CHEST/LUNG: b/l air entry, coarse  HEART: Regular   ABDOMEN: Soft, BS+  EXTREMITIES:  no edema, tenderness.    MEDICATIONS  (STANDING):  albuterol/ipratropium for Nebulization 3 milliLiter(s) Nebulizer every 6 hours  dextrose 5%. 1000 milliLiter(s) (50 mL/Hr) IV Continuous <Continuous>  guaifenesin/dextromethorphan  Syrup 10 milliLiter(s) Oral every 4 hours  heparin  Injectable 5000 Unit(s) SubCutaneous every 8 hours  lactulose Retention Enema 200 Gram(s) Rectal daily  memantine 5 milliGRAM(s) Oral two times a day  predniSONE   Tablet 40 milliGRAM(s) Oral daily  saccharomyces boulardii 250 milliGRAM(s) Oral two times a day  tamsulosin 0.4 milliGRAM(s) Oral at bedtime    MEDICATIONS  (PRN):  ALBUTerol    90 MICROgram(s) HFA Inhaler 2 Puff(s) Inhalation every 4 hours PRN Shortness of Breath and/or Wheezing      Allergies    No Known Allergies    Intolerances    Ativan (Drowsiness (Severe))  Seroquel (Other (Severe))  Xanax (Drowsiness (Severe))        LABS:                          9.7    7.76  )-----------( 143      ( 08 Dec 2019 11:03 )             32.7     12-08    146<H>  |  101  |  17.0  ----------------------------<  189<H>  3.7   |  34.0<H>  |  0.77    Ca    9.9      08 Dec 2019 11:03            RADIOLOGY & ADDITIONAL TESTS:

## 2019-12-09 LAB
CULTURE RESULTS: SIGNIFICANT CHANGE UP
SPECIMEN SOURCE: SIGNIFICANT CHANGE UP

## 2019-12-09 PROCEDURE — 99232 SBSQ HOSP IP/OBS MODERATE 35: CPT

## 2019-12-09 RX ORDER — ROBINUL 0.2 MG/ML
0.1 INJECTION INTRAMUSCULAR; INTRAVENOUS EVERY 8 HOURS
Refills: 0 | Status: DISCONTINUED | OUTPATIENT
Start: 2019-12-09 | End: 2019-12-09

## 2019-12-09 RX ORDER — RISPERIDONE 4 MG/1
0.25 TABLET ORAL AT BEDTIME
Refills: 0 | Status: DISCONTINUED | OUTPATIENT
Start: 2019-12-09 | End: 2019-12-11

## 2019-12-09 RX ORDER — SENNA PLUS 8.6 MG/1
2 TABLET ORAL AT BEDTIME
Refills: 0 | Status: DISCONTINUED | OUTPATIENT
Start: 2019-12-09 | End: 2019-12-11

## 2019-12-09 RX ORDER — HALOPERIDOL DECANOATE 100 MG/ML
1 INJECTION INTRAMUSCULAR ONCE
Refills: 0 | Status: COMPLETED | OUTPATIENT
Start: 2019-12-09 | End: 2019-12-09

## 2019-12-09 RX ORDER — ROBINUL 0.2 MG/ML
0.1 INJECTION INTRAMUSCULAR; INTRAVENOUS EVERY 6 HOURS
Refills: 0 | Status: DISCONTINUED | OUTPATIENT
Start: 2019-12-09 | End: 2019-12-11

## 2019-12-09 RX ADMIN — Medication 10 MILLILITER(S): at 18:20

## 2019-12-09 RX ADMIN — HEPARIN SODIUM 5000 UNIT(S): 5000 INJECTION INTRAVENOUS; SUBCUTANEOUS at 21:29

## 2019-12-09 RX ADMIN — Medication 10 MILLILITER(S): at 10:33

## 2019-12-09 RX ADMIN — HEPARIN SODIUM 5000 UNIT(S): 5000 INJECTION INTRAVENOUS; SUBCUTANEOUS at 13:12

## 2019-12-09 RX ADMIN — Medication 3 MILLILITER(S): at 08:59

## 2019-12-09 RX ADMIN — HEPARIN SODIUM 5000 UNIT(S): 5000 INJECTION INTRAVENOUS; SUBCUTANEOUS at 05:03

## 2019-12-09 RX ADMIN — SODIUM CHLORIDE 50 MILLILITER(S): 9 INJECTION, SOLUTION INTRAVENOUS at 05:02

## 2019-12-09 RX ADMIN — Medication 3 MILLILITER(S): at 21:23

## 2019-12-09 RX ADMIN — SODIUM CHLORIDE 50 MILLILITER(S): 9 INJECTION, SOLUTION INTRAVENOUS at 08:59

## 2019-12-09 RX ADMIN — HALOPERIDOL DECANOATE 1 MILLIGRAM(S): 100 INJECTION INTRAMUSCULAR at 00:34

## 2019-12-09 RX ADMIN — Medication 3 MILLILITER(S): at 14:29

## 2019-12-09 RX ADMIN — RISPERIDONE 0.25 MILLIGRAM(S): 4 TABLET ORAL at 21:29

## 2019-12-09 RX ADMIN — TAMSULOSIN HYDROCHLORIDE 0.4 MILLIGRAM(S): 0.4 CAPSULE ORAL at 21:29

## 2019-12-09 RX ADMIN — Medication 250 MILLIGRAM(S): at 18:12

## 2019-12-09 RX ADMIN — ROBINUL 0.1 MILLIGRAM(S): 0.2 INJECTION INTRAMUSCULAR; INTRAVENOUS at 21:27

## 2019-12-09 RX ADMIN — Medication 10 MILLILITER(S): at 13:14

## 2019-12-09 RX ADMIN — MEMANTINE HYDROCHLORIDE 5 MILLIGRAM(S): 10 TABLET ORAL at 18:19

## 2019-12-09 RX ADMIN — Medication 10 MILLILITER(S): at 22:24

## 2019-12-09 RX ADMIN — LACTULOSE 200 GRAM(S): 10 SOLUTION ORAL at 21:29

## 2019-12-09 NOTE — GOALS OF CARE CONVERSATION - ADVANCED CARE PLANNING - CONVERSATION/DISCUSSION
BERNICE Discussed/Treatment Options
Hospice Referral
Treatment Options/Hospice Referral/Diagnosis/Prognosis

## 2019-12-09 NOTE — PROGRESS NOTE ADULT - SUBJECTIVE AND OBJECTIVE BOX
FOLLOW UP VISIT    INTERVAL HPI/OVERNIGHT EVENTS:  Dtr Brii and wife at bedside. Pt with intermittent agitation overnight, now on enhanced supervision. He is tolerating pureed only.     Unable to perform ROS due to dementia.     MEDICATIONS  (STANDING):  albuterol/ipratropium for Nebulization 3 milliLiter(s) Nebulizer every 6 hours  glycopyrrolate Injectable 0.1 milliGRAM(s) IV Push every 8 hours  guaifenesin/dextromethorphan  Syrup 10 milliLiter(s) Oral every 4 hours  heparin  Injectable 5000 Unit(s) SubCutaneous every 8 hours  lactulose Retention Enema 200 Gram(s) Rectal daily  memantine 5 milliGRAM(s) Oral two times a day  predniSONE   Tablet 40 milliGRAM(s) Oral daily  risperiDONE   Tablet 0.25 milliGRAM(s) Oral at bedtime  saccharomyces boulardii 250 milliGRAM(s) Oral two times a day  tamsulosin 0.4 milliGRAM(s) Oral at bedtime    MEDICATIONS  (PRN):  ALBUTerol    90 MICROgram(s) HFA Inhaler 2 Puff(s) Inhalation every 4 hours PRN Shortness of Breath and/or Wheezing      PHYSICAL EXAM:    Vital Signs Last 24 Hrs  T(C): 36.6 (09 Dec 2019 08:01), Max: 36.6 (09 Dec 2019 08:01)  T(F): 97.9 (09 Dec 2019 08:01), Max: 97.9 (09 Dec 2019 08:01)  HR: 72 (09 Dec 2019 09:01) (65 - 87)  BP: 113/68 (09 Dec 2019 08:01) (105/71 - 113/68)  BP(mean): --  RR: 18 (09 Dec 2019 08:01) (18 - 18)  SpO2: 96% (09 Dec 2019 09:01) (96% - 99%)    General: Resting comfortably. No acute distress.   HEENT: mucous membrane dry.    Lungs: comfortable. non-labored. O2NC 3L   CV: +s1/s2. Regular rate and rhythm.     GI: +bowel sound. abdomen soft, non-tender,  obese   MSK: Moves all 4 extremities. No cyanosis. +edema of extremities  Neuro: nonfocal. confused. lethargic  :  Lo  Skin: warm and dry.     LABS:                          9.7    7.76  )-----------( 143      ( 08 Dec 2019 11:03 )             32.7     12-08    146<H>  |  101  |  17.0  ----------------------------<  189<H>  3.7   |  34.0<H>  |  0.77    Ca    9.9      08 Dec 2019 11:03      RADIOLOGY & ADDITIONAL STUDIES: Reviewed     ADVANCE DIRECTIVES: DNR/I BERNICE completed on 11/28/19

## 2019-12-09 NOTE — PROGRESS NOTE ADULT - ASSESSMENT
79M with h/o dementia and COPD admitted for acute respiratory failure 2/2 COPD exacerbation and +RSV requiring bipap support eventually transitioned off to NC. Course further complicated by dysphagia, presumed aspiration PNA and worsening lethargy and AMS, ?benzo toxicity vs CO2 narcosis s/p flumazenil, placed back on bipap 12/5. Now off bipap to NC. Continues to have significant oral secretions, progressive dysphagia and hyperactive delirium.     PLAN    Acute on Chronic Respiratory Failure with Hypercapnia  Acute COPD exacerbation/Positive RSV URI/Dyspnea  - on O2NC 3L, completed antibiotic with zosysinan nebs PRN  - pulm following, setting up trilogy for home     End Stage Alzheimer Dementia   Hyperactive Delirium   - now on enhanced supervision  - baseline: dependent for most ADLs, had aide support and [facility]  - initially family refused any benzo and neuroleptics, now receptive to medications for agitation, will start risperidone 0.25 mg qHS, family in agreement with plan    Oropharyngeal Dysphagia  - tolerating pureed only diet  - repeat s/s to determine advancement to liquid consistency  - family does not want to consider trial of NGT or feeding tube/PEG as pt would not have wanted it  - aspiration precaution    Oral Secretions  - pt with poor cough reflex and notable audible airway gargling  - will start IV glycopyrrolate 0.1 mg q8H atc    Constipation  - no reported BM x 3 days, on lactulose rectally  - will add senna and dulcolax suppository PRN    Palliative Care Encounter  Hospice RN Alisha and I met with dtr/HCP Brii and wife Julissa. We reviewed comorbidities and hospital course including but not limited to progressive dysphagia, hyperactive delirium. Family confirmed that pt would not have wanted NGT and feeding tube and want to respect his wishes. They are amendable to hospice eval after speaking with rest of family. Hospice RN provided detailed info on hospice philosophy of care and services. Hospice to follow for consent and approval.     At present time, will optimize hyperactive delirium and oral secretions. Above plan for medication titration was discussed with dtr/wife, in agreement.     Advance Directives: DNR/DNI MOLST 11/28/19. Will need to update MOLST to reflect pt/family's wishes prior to discharge, family are considering comfort measures, no rehospitalization, no feeding tube on discharge.

## 2019-12-09 NOTE — PROVIDER CONTACT NOTE (OTHER) - SITUATION
Pt is a 80 y/o male admitted with RSV, COPD, h/o co2 retention and hypoxia, dementia. Pt is currently trying to pull out diaz cath, IV and bipap machine and getting out of bed, trying to hit staff

## 2019-12-09 NOTE — PROGRESS NOTE ADULT - SUBJECTIVE AND OBJECTIVE BOX
INTERVAL HPI/OVERNIGHT EVENTS:    CC: aspiration pneumonia, dementia, urinary retention, COPD exacerbation      No overnight events, wife and daughter at bedside, concerned about right arm swelling. They are willing to speak with hospice and wish to take him home on hospice services.    Vital Signs Last 24 Hrs  T(C): 36.6 (09 Dec 2019 08:01), Max: 36.6 (09 Dec 2019 08:01)  T(F): 97.9 (09 Dec 2019 08:01), Max: 97.9 (09 Dec 2019 08:01)  HR: 72 (09 Dec 2019 09:01) (65 - 87)  BP: 113/68 (09 Dec 2019 08:01) (105/71 - 113/68)  BP(mean): --  RR: 18 (09 Dec 2019 08:01) (18 - 18)  SpO2: 96% (09 Dec 2019 09:01) (96% - 99%)    PHYSICAL EXAM:    GENERAL: Not in distress, drowsy but arousable.  CHEST/LUNG: b/l air entry, coarse  HEART: Regular  ABDOMEN: Soft,BS+  EXTREMITIES: right arm swelling, rest 1+ edema, non tender    MEDICATIONS  (STANDING):  albuterol/ipratropium for Nebulization 3 milliLiter(s) Nebulizer every 6 hours  glycopyrrolate Injectable 0.1 milliGRAM(s) IV Push every 8 hours  guaifenesin/dextromethorphan  Syrup 10 milliLiter(s) Oral every 4 hours  heparin  Injectable 5000 Unit(s) SubCutaneous every 8 hours  lactulose Retention Enema 200 Gram(s) Rectal daily  memantine 5 milliGRAM(s) Oral two times a day  predniSONE   Tablet 40 milliGRAM(s) Oral daily  risperiDONE   Tablet 0.25 milliGRAM(s) Oral at bedtime  saccharomyces boulardii 250 milliGRAM(s) Oral two times a day  tamsulosin 0.4 milliGRAM(s) Oral at bedtime    MEDICATIONS  (PRN):  ALBUTerol    90 MICROgram(s) HFA Inhaler 2 Puff(s) Inhalation every 4 hours PRN Shortness of Breath and/or Wheezing      Allergies    No Known Allergies    Intolerances    Ativan (Drowsiness (Severe))  Seroquel (Other (Severe))  Xanax (Drowsiness (Severe))        LABS:                          9.7    7.76  )-----------( 143      ( 08 Dec 2019 11:03 )             32.7     12-08    146<H>  |  101  |  17.0  ----------------------------<  189<H>  3.7   |  34.0<H>  |  0.77    Ca    9.9      08 Dec 2019 11:03            RADIOLOGY & ADDITIONAL TESTS:

## 2019-12-09 NOTE — GOALS OF CARE CONVERSATION - ADVANCED CARE PLANNING - CONVERSATION DETAILS
Daughter Kanika, Son Oscar and Aamir, wife, grandchildren at bedside.  Goals of care - I had a lengthy conversation with pt / primary caregiver.  We discussed the comorbid conditions, hospital care, and prognosis.    I also discussed advanced directives with the patient / family - made aware of limited prognosis if patient were to be intubated or resuscitated, in consideration of age and comorbid conditions.  They agreed with DNR / DNI.  Total time spent on patient care discussion = 20 minutes.
Wife and son cinthia [phone] at bedside.  They state pt is at baseline, frequently sleeps and is difficult to arouse.    Seen with Critical Care at bedside.  Goals of care - I had a lengthy conversation with pt / primary caregiver.  We discussed the comorbid conditions, hospital care, and prognosis.    I also discussed advanced directives with the patient / family - made aware of limited prognosis if patient were to be intubated or resuscitated, in consideration of age and comorbid conditions.  They wished for no advanced directives - that all interventions be pursued.        Total time spent on patient care discussion = 20 minutes.
I discussed goal of care with patient's 2 son at bed side. They stated that their mother is the HCP and she wants aggressive measures as need to be taken. Patient remain full code.
Met with patient and family. Hospice care discussed. Consents signed for Home Hospice. Dr. Osorio and  Annabella informed re same.
Unit NP Precious Chavez and I met with pt and dtr/HCP Brii. We updated hospital course including improving respiratory status, now off Bipap to NC, likely secondary to presumed aspiration on antibiotic therapy and progressive dysphagia. Informed that speech therapist reevaluated this morning andt unfortunately pt was noted to be pocketing food and with increased oral secretions, recommended NPO at this time.     We explored alternative means of nutrition including temporary NGT and if still no improvement in a few days will need to consider feeding tube ex PEG (which does not reduce risk of aspiration). Dtr is not sure if pt would want a NGT or tolerate it as he is known to pull off bipap on his own.  Brii also indicated that pt had clearly verbalized to family he would not have wanted a feeding tube nor any extraordinary measures like CPR/mechanical intubation. If dysphagia persist and family declining a PEG, we are looking at end of life care. I explained alternative option of  comfort measures with support of hospice services, focus of treatment on quality of life and allowing pleasure feeds.     Dtr appreciative of update and information. She plans to speak with rest of family (there are 7 children in total with in-laws, wife all actively involved in pt's care) before making any further decisions. Plan for now is to continue current medical mgnt, repeat swallow eval tomorrow now that pt's mental status is improving.     Brii ([phone]) will also bring in a copy of HCP form at home for our records. All questions answered and psychosocial support provided.

## 2019-12-10 ENCOUNTER — TRANSCRIPTION ENCOUNTER (OUTPATIENT)
Age: 79
End: 2019-12-10

## 2019-12-10 PROCEDURE — 99232 SBSQ HOSP IP/OBS MODERATE 35: CPT

## 2019-12-10 RX ADMIN — ROBINUL 0.1 MILLIGRAM(S): 0.2 INJECTION INTRAMUSCULAR; INTRAVENOUS at 13:41

## 2019-12-10 RX ADMIN — Medication 3 MILLILITER(S): at 16:08

## 2019-12-10 RX ADMIN — ROBINUL 0.1 MILLIGRAM(S): 0.2 INJECTION INTRAMUSCULAR; INTRAVENOUS at 17:48

## 2019-12-10 RX ADMIN — HEPARIN SODIUM 5000 UNIT(S): 5000 INJECTION INTRAVENOUS; SUBCUTANEOUS at 13:43

## 2019-12-10 RX ADMIN — SENNA PLUS 2 TABLET(S): 8.6 TABLET ORAL at 21:21

## 2019-12-10 RX ADMIN — HEPARIN SODIUM 5000 UNIT(S): 5000 INJECTION INTRAVENOUS; SUBCUTANEOUS at 21:23

## 2019-12-10 RX ADMIN — MEMANTINE HYDROCHLORIDE 5 MILLIGRAM(S): 10 TABLET ORAL at 05:50

## 2019-12-10 RX ADMIN — Medication 10 MILLILITER(S): at 17:54

## 2019-12-10 RX ADMIN — Medication 250 MILLIGRAM(S): at 17:54

## 2019-12-10 RX ADMIN — MEMANTINE HYDROCHLORIDE 5 MILLIGRAM(S): 10 TABLET ORAL at 17:53

## 2019-12-10 RX ADMIN — HEPARIN SODIUM 5000 UNIT(S): 5000 INJECTION INTRAVENOUS; SUBCUTANEOUS at 05:56

## 2019-12-10 RX ADMIN — ROBINUL 0.1 MILLIGRAM(S): 0.2 INJECTION INTRAMUSCULAR; INTRAVENOUS at 04:50

## 2019-12-10 RX ADMIN — Medication 3 MILLILITER(S): at 08:45

## 2019-12-10 RX ADMIN — RISPERIDONE 0.25 MILLIGRAM(S): 4 TABLET ORAL at 21:22

## 2019-12-10 RX ADMIN — Medication 3 MILLILITER(S): at 04:22

## 2019-12-10 RX ADMIN — TAMSULOSIN HYDROCHLORIDE 0.4 MILLIGRAM(S): 0.4 CAPSULE ORAL at 21:21

## 2019-12-10 RX ADMIN — Medication 40 MILLIGRAM(S): at 05:50

## 2019-12-10 RX ADMIN — Medication 10 MILLILITER(S): at 10:08

## 2019-12-10 RX ADMIN — ROBINUL 0.1 MILLIGRAM(S): 0.2 INJECTION INTRAMUSCULAR; INTRAVENOUS at 10:08

## 2019-12-10 RX ADMIN — Medication 10 MILLILITER(S): at 05:50

## 2019-12-10 RX ADMIN — Medication 10 MILLILITER(S): at 21:21

## 2019-12-10 NOTE — DISCHARGE NOTE PROVIDER - NSDCMRMEDTOKEN_GEN_ALL_CORE_FT
albuterol 90 mcg/inh inhalation aerosol with adapter: inhaled 2 times a day  cephalexin 500 mg oral tablet: 1 tab(s) orally 3 times a day  galantamine 8 mg oral tablet: 1 tab(s) orally once a day (at bedtime)  galantamine 8 mg oral tablet: 1 tab(s) orally 2 times a day  memantine 5 mg oral tablet: 1 tab(s) orally 2 times a day  ProAir HFA 90 mcg/inh inhalation aerosol: 2 puff(s) inhaled 4 times a day  Spiriva 18 mcg inhalation capsule: 1 cap(s) inhaled once a day  Symbicort 80 mcg-4.5 mcg/inh inhalation aerosol: 2 puff(s) inhaled 2 times a day bisacodyl 10 mg rectal suppository: 1 suppository(ies) rectal once a day, As needed, Constipation  galantamine 8 mg oral tablet: 1 tab(s) orally once a day (at bedtime)  galantamine 8 mg oral tablet: 1 tab(s) orally 2 times a day  glycopyrrolate 1 mg oral tablet: 1 tab(s) orally 3 times a day AS NEEDED MDD:NOT MORE THAN 3 TIMES A DAY  guaifenesin-dextromethorphan 100 mg-10 mg/5 mL oral liquid: 10 milliliter(s) orally every 4 hours  ipratropium-albuterol 0.5 mg-2.5 mg/3 mLinhalation solution: 3 milliliter(s) inhaled every 6 hours  memantine 5 mg oral tablet: 1 tab(s) orally 2 times a day  morphine 20 mg/5 mL oral solution: 1 milliliter(s) orally every 4 hours MDD:NOT MORE THAN 6 TIMES A DAY AS NEEDED FOR SHORTNESS OF BREATH  predniSONE 10 mg oral tablet: 3 tab PO QD X 3 DAYS, THEN 2 TABS PO QD X 3 DAYS THEN 1 TAB PO QD X 5 DAYS  risperiDONE 0.25 mg oral tablet: 1 tab(s) orally once a day (at bedtime)  senna oral tablet: 2 tab(s) orally once a day (at bedtime)  tamsulosin 0.4 mg oral capsule: 1 cap(s) orally once a day (at bedtime)

## 2019-12-10 NOTE — DISCHARGE NOTE PROVIDER - NSDCCPCAREPLAN_GEN_ALL_CORE_FT
PRINCIPAL DISCHARGE DIAGNOSIS  Diagnosis: RSV (respiratory syncytial virus infection)  Assessment and Plan of Treatment:       SECONDARY DISCHARGE DIAGNOSES  Diagnosis: Hypoxia  Assessment and Plan of Treatment:

## 2019-12-10 NOTE — PROGRESS NOTE ADULT - SUBJECTIVE AND OBJECTIVE BOX
FOLLOW UP VISIT    INTERVAL HPI/OVERNIGHT EVENTS:  Remains on enhanced supervision, per aide overnight with intermittent agitation. This AM, pt doing well, woke and able to self feed himself breakfast. Diet was advanced to pureed honey yesterday. Wife at bedside. Pt denied any acute complaints.     Limited ROS due to dementia.   +BM today    MEDICATIONS  (STANDING):  albuterol/ipratropium for Nebulization 3 milliLiter(s) Nebulizer every 6 hours  glycopyrrolate Injectable 0.1 milliGRAM(s) IV Push every 6 hours  guaifenesin/dextromethorphan  Syrup 10 milliLiter(s) Oral every 4 hours  heparin  Injectable 5000 Unit(s) SubCutaneous every 8 hours  lactulose Retention Enema 200 Gram(s) Rectal daily  memantine 5 milliGRAM(s) Oral two times a day  predniSONE   Tablet 40 milliGRAM(s) Oral daily  risperiDONE   Tablet 0.25 milliGRAM(s) Oral at bedtime  saccharomyces boulardii 250 milliGRAM(s) Oral two times a day  senna 2 Tablet(s) Oral at bedtime  tamsulosin 0.4 milliGRAM(s) Oral at bedtime    MEDICATIONS  (PRN):  ALBUTerol    90 MICROgram(s) HFA Inhaler 2 Puff(s) Inhalation every 4 hours PRN Shortness of Breath and/or Wheezing  bisacodyl Suppository 10 milliGRAM(s) Rectal daily PRN Constipation    PHYSICAL EXAM:  Vital Signs Last 24 Hrs  T(C): 36.4 (09 Dec 2019 23:39), Max: 36.4 (09 Dec 2019 23:39)  T(F): 97.6 (09 Dec 2019 23:39), Max: 97.6 (09 Dec 2019 23:39)  HR: 72 (10 Dec 2019 04:22) (72 - 88)  BP: 130/67 (09 Dec 2019 23:39) (130/67 - 131/81)  BP(mean): --  RR: 19 (09 Dec 2019 23:39) (18 - 19)  SpO2: 100% (10 Dec 2019 10:44) (95% - 100%)    General: Resting comfortably. No acute distress.   HEENT: mucous membrane moist.    Lungs: comfortable. non-labored. O2NC   CV: +s1/s2. Regular rate and rhythm.     GI: +bowel sound. abdomen soft, non-tender,  obese   MSK: Moves all 4 extremities. No cyanosis. +edema of extremities  Neuro: nonfocal. responds to verbal stimuli, recognizes his wife, pleasantly confused  :  Lo  Skin: warm and dry.     LABS: reviewed  RADIOLOGY & ADDITIONAL STUDIES: Reviewed     ADVANCE DIRECTIVES: DNR/I MOLST completed on 11/28/19

## 2019-12-10 NOTE — DISCHARGE NOTE PROVIDER - NSDCFUSCHEDAPPT_GEN_ALL_CORE_FT
KATH SCHULER ; 02/06/2020 ; JENNIFER Ang [address] [place] [address] KATH SCHULER ; 02/06/2020 ; JENNIFER Ang [address] [place] [address]

## 2019-12-10 NOTE — DISCHARGE NOTE PROVIDER - HOSPITAL COURSE
79 yr old male with hyperlipidemia, dementia, COPD admitted with complains shortness of breath, wheezing and cough. Noted to be in acute hypercapnic and hypoxic respiratory failure, tested positive for RSV. He was admitted to step down for further management with steroids, nebs. He required BiPAP for hypercapnic respiratory failure. Goals of care were discussed with family, they wished aggressive measures and full code. He continued to have worsening hypercapnic respiratory failure and was not able to be weaned off BiPAP and ICU was consulted. He was not deemed to require ICU admission. Multiple goals of care conversations were held with family, they initially wanted full code but later agreed to DNR/DNI. Pulmonary evaluation was requested, trilogy was advised on discharge. His diet was advanced per speech and swallow. He had episodes of hypothermia, endocrine was consulted, adrenal insufficiency was ruled out. He developed worsening secretions, concern for aspiration pneumonia and hence Zosyn was initiated. Lo was placed on 12/6 for retention. Palliative care was consulted, after discussion with family, hospice evaluation was requested. He was approved for home hospice and is being discharged home for further care.        Spent > 35 mins in discharge plan and documentation.

## 2019-12-10 NOTE — PROGRESS NOTE ADULT - SUBJECTIVE AND OBJECTIVE BOX
INTERVAL HPI/OVERNIGHT EVENTS:    CC: aspiration pneumonia, dementia, urinary retention, COPD exacerbation    No overnight events, no distress.    Vital Signs Last 24 Hrs  T(C): 36.4 (09 Dec 2019 23:39), Max: 36.4 (09 Dec 2019 23:39)  T(F): 97.6 (09 Dec 2019 23:39), Max: 97.6 (09 Dec 2019 23:39)  HR: 72 (10 Dec 2019 04:22) (72 - 88)  BP: 130/67 (09 Dec 2019 23:39) (130/67 - 131/81)  BP(mean): --  RR: 19 (09 Dec 2019 23:39) (18 - 19)  SpO2: 100% (10 Dec 2019 10:44) (95% - 100%)    PHYSICAL EXAM:    GENERAL: not in distress, drowsy but arousable  CHEST/LUNG: b/l air entry  HEART: Regular  ABDOMEN: Soft, BS+  EXTREMITIES:  no edema, tenderness    MEDICATIONS  (STANDING):  albuterol/ipratropium for Nebulization 3 milliLiter(s) Nebulizer every 6 hours  glycopyrrolate Injectable 0.1 milliGRAM(s) IV Push every 6 hours  guaifenesin/dextromethorphan  Syrup 10 milliLiter(s) Oral every 4 hours  heparin  Injectable 5000 Unit(s) SubCutaneous every 8 hours  lactulose Retention Enema 200 Gram(s) Rectal daily  memantine 5 milliGRAM(s) Oral two times a day  predniSONE   Tablet 40 milliGRAM(s) Oral daily  risperiDONE   Tablet 0.25 milliGRAM(s) Oral at bedtime  saccharomyces boulardii 250 milliGRAM(s) Oral two times a day  senna 2 Tablet(s) Oral at bedtime  tamsulosin 0.4 milliGRAM(s) Oral at bedtime    MEDICATIONS  (PRN):  ALBUTerol    90 MICROgram(s) HFA Inhaler 2 Puff(s) Inhalation every 4 hours PRN Shortness of Breath and/or Wheezing  bisacodyl Suppository 10 milliGRAM(s) Rectal daily PRN Constipation      Allergies    No Known Allergies    Intolerances    Ativan (Drowsiness (Severe))  Seroquel (Other (Severe))  Xanax (Drowsiness (Severe))        LABS:                  RADIOLOGY & ADDITIONAL TESTS:

## 2019-12-10 NOTE — PROGRESS NOTE ADULT - ASSESSMENT
79M with h/o dementia and COPD admitted for acute respiratory failure 2/2 COPD exacerbation and +RSV requiring bipap support eventually transitioned off to NC. Course further complicated by dysphagia, presumed aspiration PNA and worsening lethargy and AMS, ?benzo toxicity vs CO2 narcosis s/p flumazenil, placed back on bipap 12/5. Now off bipap to NC. Continues to have significant oral secretions, progressive dysphagia and hyperactive delirium.     PLAN    Acute on Chronic Respiratory Failure with Hypercapnia  Acute COPD exacerbation/Positive RSV URI/Dyspnea  - on O2NC, completed antibiotic with zosyn, nebs PRN  - pulm following, setting up trilogy for home     End Stage Alzheimer Dementia   Hyperactive Delirium   - on enhanced supervision, intermittent agitation   - baseline: fully dependent for ADLs, had aide support and [facility]  - c/w risperidone 0.25 mg qHS (started 12/9 after discussing with family)    Oropharyngeal Dysphagia  - diet advanced to pureed honey yesterday, tolerating well  - aspiration precaution    Oral Secretions  - improved, c/w IV glycopyrrolate 0.1 mg q8H atc    Constipation  - BM today, c/w bowel regimen as ordered    Palliative Care Encounter  Pt is overall symptomatically stable and comfortable with current regimen.  Family were in agreement for hospice services, consent signed and pending delivery of DME today. Tentative discharge to home with hospice tomorrow. Wife had no follow up questions today and happy to see that pt was able to feed himself this morning.

## 2019-12-10 NOTE — PROGRESS NOTE ADULT - ASSESSMENT
79 yr old male with hyperlipidemia, dementia, COPD admitted with complains shortness of breath, wheezing and cough. Noted to be in acute hypercapnic and hypoxic respiratory failure, tested positive for RSV. He was admitted to step down for further management with steroids, nebs. He required BiPAP for hypercapnic respiratory failure. Goals of care were discussed with family, they wished aggressive measures and full code. He continued to have worsening hypercapnic respiratory failure and was not able to be weaned off BiPAP and ICU was consulted. He was not deemed to require ICU admission. Multiple goals of care conversations were held with family, they initially wanted full code but later agreed to DNR/DNI. Pulmonary evaluation was requested, trilogy was advised on discharge. His diet was advanced per speech and swallow. He had episodes of hypothermia, endocrine was consulted, adrenal insufficiency was ruled out. He developed worsening secretions, concern for aspiration pneumonia and hence Zosyn was initiated. Lo was placed on 12/6 for retention. Palliative care was consulted, after discussion with family, hospice evaluation was requested. He was approved for home hospice.

## 2019-12-11 ENCOUNTER — TRANSCRIPTION ENCOUNTER (OUTPATIENT)
Age: 79
End: 2019-12-11

## 2019-12-11 VITALS
DIASTOLIC BLOOD PRESSURE: 74 MMHG | HEART RATE: 83 BPM | SYSTOLIC BLOOD PRESSURE: 120 MMHG | OXYGEN SATURATION: 100 % | TEMPERATURE: 98 F | RESPIRATION RATE: 18 BRPM

## 2019-12-11 PROCEDURE — 97116 GAIT TRAINING THERAPY: CPT

## 2019-12-11 PROCEDURE — 94640 AIRWAY INHALATION TREATMENT: CPT

## 2019-12-11 PROCEDURE — 80053 COMPREHEN METABOLIC PANEL: CPT

## 2019-12-11 PROCEDURE — 96374 THER/PROPH/DIAG INJ IV PUSH: CPT

## 2019-12-11 PROCEDURE — 94760 N-INVAS EAR/PLS OXIMETRY 1: CPT

## 2019-12-11 PROCEDURE — 99239 HOSP IP/OBS DSCHRG MGMT >30: CPT

## 2019-12-11 PROCEDURE — 97530 THERAPEUTIC ACTIVITIES: CPT

## 2019-12-11 PROCEDURE — 92610 EVALUATE SWALLOWING FUNCTION: CPT

## 2019-12-11 PROCEDURE — 82803 BLOOD GASES ANY COMBINATION: CPT

## 2019-12-11 PROCEDURE — 36415 COLL VENOUS BLD VENIPUNCTURE: CPT

## 2019-12-11 PROCEDURE — 97163 PT EVAL HIGH COMPLEX 45 MIN: CPT

## 2019-12-11 PROCEDURE — 84100 ASSAY OF PHOSPHORUS: CPT

## 2019-12-11 PROCEDURE — 80048 BASIC METABOLIC PNL TOTAL CA: CPT

## 2019-12-11 PROCEDURE — 31720 CLEARANCE OF AIRWAYS: CPT

## 2019-12-11 PROCEDURE — 92526 ORAL FUNCTION THERAPY: CPT

## 2019-12-11 PROCEDURE — 94660 CPAP INITIATION&MGMT: CPT

## 2019-12-11 PROCEDURE — 74018 RADEX ABDOMEN 1 VIEW: CPT

## 2019-12-11 PROCEDURE — 83880 ASSAY OF NATRIURETIC PEPTIDE: CPT

## 2019-12-11 PROCEDURE — 83735 ASSAY OF MAGNESIUM: CPT

## 2019-12-11 PROCEDURE — 82962 GLUCOSE BLOOD TEST: CPT

## 2019-12-11 PROCEDURE — 99291 CRITICAL CARE FIRST HOUR: CPT | Mod: 25

## 2019-12-11 PROCEDURE — 71045 X-RAY EXAM CHEST 1 VIEW: CPT

## 2019-12-11 PROCEDURE — 96375 TX/PRO/DX INJ NEW DRUG ADDON: CPT

## 2019-12-11 PROCEDURE — 36600 WITHDRAWAL OF ARTERIAL BLOOD: CPT

## 2019-12-11 PROCEDURE — 82533 TOTAL CORTISOL: CPT

## 2019-12-11 PROCEDURE — 70450 CT HEAD/BRAIN W/O DYE: CPT

## 2019-12-11 PROCEDURE — 87040 BLOOD CULTURE FOR BACTERIA: CPT

## 2019-12-11 PROCEDURE — 85027 COMPLETE CBC AUTOMATED: CPT

## 2019-12-11 PROCEDURE — 87631 RESP VIRUS 3-5 TARGETS: CPT

## 2019-12-11 RX ORDER — SENNA PLUS 8.6 MG/1
2 TABLET ORAL
Qty: 60 | Refills: 0
Start: 2019-12-11 | End: 2020-01-09

## 2019-12-11 RX ORDER — GUAIFENESIN/DEXTROMETHORPHAN 600MG-30MG
10 TABLET, EXTENDED RELEASE 12 HR ORAL
Qty: 200 | Refills: 0
Start: 2019-12-11 | End: 2019-12-25

## 2019-12-11 RX ORDER — TAMSULOSIN HYDROCHLORIDE 0.4 MG/1
1 CAPSULE ORAL
Qty: 30 | Refills: 0
Start: 2019-12-11 | End: 2020-01-09

## 2019-12-11 RX ORDER — ROBINUL 0.2 MG/ML
1 INJECTION INTRAMUSCULAR; INTRAVENOUS
Qty: 21 | Refills: 0
Start: 2019-12-11 | End: 2019-12-17

## 2019-12-11 RX ORDER — IPRATROPIUM/ALBUTEROL SULFATE 18-103MCG
3 AEROSOL WITH ADAPTER (GRAM) INHALATION
Qty: 250 | Refills: 0
Start: 2019-12-11 | End: 2020-01-09

## 2019-12-11 RX ORDER — ALBUTEROL 90 UG/1
2 AEROSOL, METERED ORAL
Qty: 0 | Refills: 0 | DISCHARGE

## 2019-12-11 RX ORDER — TIOTROPIUM BROMIDE 18 UG/1
1 CAPSULE ORAL; RESPIRATORY (INHALATION)
Qty: 0 | Refills: 0 | DISCHARGE

## 2019-12-11 RX ORDER — CEPHALEXIN 500 MG
1 CAPSULE ORAL
Qty: 0 | Refills: 0 | DISCHARGE

## 2019-12-11 RX ORDER — ALBUTEROL 90 UG/1
0 AEROSOL, METERED ORAL
Qty: 0 | Refills: 0 | DISCHARGE

## 2019-12-11 RX ORDER — MORPHINE SULFATE 50 MG/1
1 CAPSULE, EXTENDED RELEASE ORAL
Qty: 100 | Refills: 0
Start: 2019-12-11 | End: 2019-12-17

## 2019-12-11 RX ORDER — BUDESONIDE AND FORMOTEROL FUMARATE DIHYDRATE 160; 4.5 UG/1; UG/1
2 AEROSOL RESPIRATORY (INHALATION)
Qty: 0 | Refills: 0 | DISCHARGE

## 2019-12-11 RX ORDER — RISPERIDONE 4 MG/1
1 TABLET ORAL
Qty: 30 | Refills: 0
Start: 2019-12-11 | End: 2020-01-09

## 2019-12-11 RX ADMIN — MEMANTINE HYDROCHLORIDE 5 MILLIGRAM(S): 10 TABLET ORAL at 06:00

## 2019-12-11 RX ADMIN — Medication 250 MILLIGRAM(S): at 05:59

## 2019-12-11 RX ADMIN — ROBINUL 0.1 MILLIGRAM(S): 0.2 INJECTION INTRAMUSCULAR; INTRAVENOUS at 12:32

## 2019-12-11 RX ADMIN — HEPARIN SODIUM 5000 UNIT(S): 5000 INJECTION INTRAVENOUS; SUBCUTANEOUS at 06:01

## 2019-12-11 RX ADMIN — LACTULOSE 200 GRAM(S): 10 SOLUTION ORAL at 12:32

## 2019-12-11 RX ADMIN — Medication 10 MILLILITER(S): at 06:00

## 2019-12-11 RX ADMIN — Medication 3 MILLILITER(S): at 08:03

## 2019-12-11 RX ADMIN — Medication 40 MILLIGRAM(S): at 05:59

## 2019-12-11 RX ADMIN — ROBINUL 0.1 MILLIGRAM(S): 0.2 INJECTION INTRAMUSCULAR; INTRAVENOUS at 06:01

## 2019-12-11 RX ADMIN — Medication 10 MILLILITER(S): at 01:19

## 2019-12-11 RX ADMIN — ROBINUL 0.1 MILLIGRAM(S): 0.2 INJECTION INTRAMUSCULAR; INTRAVENOUS at 01:19

## 2019-12-11 NOTE — PROGRESS NOTE ADULT - PROBLEM SELECTOR PLAN 3
Supportive care, diet advanced as per speech and swallow recommendations.
Supportive care, diet advanced as per speech and swallow recommendations.
Supportive care, speech and swallow follow up, aspiration precautions.
Supportive care, speech and swallow follow up, aspiration precautions. Explained to family concept of pleasure feeds.
Supportive care, aspiration precautions. Pulled out IVs this morning, CO ordered, needs new IV access.

## 2019-12-11 NOTE — PROGRESS NOTE ADULT - REASON FOR ADMISSION
COPD exacerbation

## 2019-12-11 NOTE — PROGRESS NOTE ADULT - ATTENDING COMMENTS
D/W dtr Brii and wife, hospitalist Dr. Mcdowell, hospice RN Alisha    Spent >20 mins in above goc
D/W pt, dtr/HCP Brii RN, hospitalist Dr. Adams, NP Precious Chavez
D/W pt, wife, aide, unit NP Ghazal Nails, hospice RN Stephanie
I saw and examined the patient, and were physically present for the key portions of the Evaluation and Management service provided. I agree with the above history, physical, and plan which I have reviewed and edited where appropriate.  cc- dysphagia oropharyngeal, suspect aspiration PNA  p/e- confused, not agitated, tolerating NC O2 well  coarse BS zachariah main airway congestion +, s1 s2 +, soft/ bs+/ nt/ nd, no edema  A/P- copd exacerbation- tapering steroids , stayed off bipap overnight  hypothermia- per endocrine adrenal insuff ruled out  PT eval - to [facility]  swallow eval- NPO  hypernatremia- dextrose IV  suspect asp pna- zosyn x 5 days started
I saw and examined the patient, and were physically present for the key portions of the Evaluation and Management service provided. I agree with the above history, physical, and plan which I have reviewed and edited where appropriate.  cc- dysphagia oropharyngeal, suspect aspiration PNA  p/e- confused, not agitated, tolerating NC O2 well  coarse BS zachariah main airway congestion +, s1 s2 +, soft/ bs+/ nt/ nd, no edema  A/P- copd exacerbation- tapering steroids, stayed off bipap overnight  hypothermia- per endocrine adrenal insuff ruled out  PT eval - to [facility]  swallow eval- today cleared for pureed diet  hypernatremia- dextrose IV  aspiration pneumonitis- zosyn x 5 days started
I saw and examined the patient, and were physically present for the key portions of the Evaluation and Management service provided. I agree with the above history, physical, and plan which I have reviewed and edited where appropriate.  cc- rsv, hypoxic resp failure, dementia  p/e- confused, not agitated, tolerating NC O2 well  cta zachariah, s1 s2 +, soft/ bs+/ nt/ nd, no edema  A/P- copd exacerbation- tapering steroids today. hold off bipap tonight and monitor on O2 NC. during daytime taper NC O2 to 1 L  hypothermia- am cortisol on steroids is 5.8. endocrine consulted to r/o adrenal insuff  PT eval awaited
Discussed with RN.
Discussed with patient's wife and daughter at bedside regarding plan of care. Likely home hospice.
Patient approved for home hospice.  Plan for discharge in am once DME delivered at home.
Stable for discharge home with hospice.
Discussed with RN.

## 2019-12-11 NOTE — PROGRESS NOTE ADULT - PROBLEM SELECTOR PROBLEM 2
COPD (chronic obstructive pulmonary disease)
RSV (respiratory syncytial virus infection)
RSV (respiratory syncytial virus infection)
COPD exacerbation

## 2019-12-11 NOTE — DISCHARGE NOTE NURSING/CASE MANAGEMENT/SOCIAL WORK - PATIENT PORTAL LINK FT
You can access the FollowMyHealth Patient Portal offered by [facility] by registering at the following website: [email or web address]. By joining [employer]’s FollowMyHealth portal, you will also be able to view your health information using other applications (apps) compatible with our system.

## 2019-12-11 NOTE — PROGRESS NOTE ADULT - PROBLEM SELECTOR PLAN 1
On Prednisone, supplemental oxygen.
On Prednisone, supplemental oxygen.
Prednisone taper, supplemental oxygen, trilogy as per pulmonary.
Supplemental oxygen, on Prednisone, family awaiting hospice evaluation.
Improving, trilogy on discharge as per pulmonary. Prednisone taper.

## 2019-12-11 NOTE — PROGRESS NOTE ADULT - PROBLEM SELECTOR PLAN 4
Continue Flomax, maintain Lo for comfort.
Continue Flomax, maintain Lo.
Lo inserted, continue Flomax.

## 2019-12-11 NOTE — PROGRESS NOTE ADULT - SUBJECTIVE AND OBJECTIVE BOX
INTERVAL HPI/OVERNIGHT EVENTS:    CC:  aspiration pneumonia, dementia, urinary retention, COPD exacerbation    No overnight events, more alert today.    Vital Signs Last 24 Hrs  T(C): 36.8 (11 Dec 2019 07:30), Max: 36.8 (11 Dec 2019 07:30)  T(F): 98.3 (11 Dec 2019 07:30), Max: 98.3 (11 Dec 2019 07:30)  HR: 76 (11 Dec 2019 08:20) (76 - 83)  BP: 120/74 (11 Dec 2019 07:30) (101/62 - 159/75)  BP(mean): --  RR: 18 (11 Dec 2019 07:30) (18 - 18)  SpO2: 100% (11 Dec 2019 07:30) (93% - 100%)    PHYSICAL EXAM:    GENERAL: Not in distress, more alert today  CHEST/LUNG: b/l air entry  HEART: Regular  ABDOMEN: Soft, BS+  EXTREMITIES:  no edema, tenderness    MEDICATIONS  (STANDING):  albuterol/ipratropium for Nebulization 3 milliLiter(s) Nebulizer every 6 hours  glycopyrrolate Injectable 0.1 milliGRAM(s) IV Push every 6 hours  guaifenesin/dextromethorphan  Syrup 10 milliLiter(s) Oral every 4 hours  heparin  Injectable 5000 Unit(s) SubCutaneous every 8 hours  lactulose Retention Enema 200 Gram(s) Rectal daily  memantine 5 milliGRAM(s) Oral two times a day  predniSONE   Tablet 40 milliGRAM(s) Oral daily  risperiDONE   Tablet 0.25 milliGRAM(s) Oral at bedtime  saccharomyces boulardii 250 milliGRAM(s) Oral two times a day  senna 2 Tablet(s) Oral at bedtime  tamsulosin 0.4 milliGRAM(s) Oral at bedtime    MEDICATIONS  (PRN):  ALBUTerol    90 MICROgram(s) HFA Inhaler 2 Puff(s) Inhalation every 4 hours PRN Shortness of Breath and/or Wheezing  bisacodyl Suppository 10 milliGRAM(s) Rectal daily PRN Constipation      Allergies    No Known Allergies    Intolerances    Ativan (Drowsiness (Severe))  Seroquel (Other (Severe))  Xanax (Drowsiness (Severe))        LABS:                  RADIOLOGY & ADDITIONAL TESTS:

## 2019-12-11 NOTE — PROGRESS NOTE ADULT - PROBLEM SELECTOR PROBLEM 1
COPD (chronic obstructive pulmonary disease)
COPD (chronic obstructive pulmonary disease)
RSV (respiratory syncytial virus infection)
Respiratory failure with hypercapnia

## 2019-12-11 NOTE — PROGRESS NOTE ADULT - PROBLEM SELECTOR PLAN 5
Completed antibiotics, supplemental oxygen.
Completed antibiotics, supplemental oxygen.
Completed course of Zosyn.
On Zosyn, aspiration precautions, supplemental oxygen.
On Zosyn, aspiration precautions, supplemental oxygen.

## 2020-02-06 ENCOUNTER — APPOINTMENT (OUTPATIENT)
Dept: PULMONOLOGY | Facility: CLINIC | Age: 80
End: 2020-02-06

## 2020-12-15 NOTE — PHYSICAL THERAPY INITIAL EVALUATION ADULT - MUSCLE TONE ASSESSMENT, REHAB EVAL
What Type Of Note Output Would You Prefer (Optional)?: Standard Output What Is The Reason For Today's Visit?: Full Body Skin Examination What Is The Reason For Today's Visit? (Being Monitored For X): concerning skin lesions on an annual basis How Severe Are Your Spot(S)?: mild Admitting diagnosis afib normal/bilateral upper extremities/bilateral lower extremities

## 2021-07-08 NOTE — ED PROVIDER NOTE - CHIEF COMPLAINT
The patient is a 79y Male complaining of unresponsive.
Pt has slight red rash Rt arm, Pt states its from sweating

## 2021-07-28 NOTE — H&P ADULT - NSICDXPASTMEDICALHX_GEN_ALL_CORE_FT
PAST MEDICAL HISTORY:  COPD (chronic obstructive pulmonary disease)     Dementia     HLD (hyperlipidemia)
n/a

## 2022-09-16 NOTE — ED ADULT NURSE NOTE - CAS TRG GEN SKIN CONDITION
Medication reconciliation completed by RN. Form and chart forwarded to PCP for signatures.    Sarahi Boucher RN     Warm

## 2023-06-02 NOTE — PROGRESS NOTE ADULT - SUBJECTIVE AND OBJECTIVE BOX
noted FOLLOW UP VISIT    INTERVAL HPI/OVERNIGHT EVENTS:  Off Bipap, on NC at 4L. He is more awake today, dtr Brii at bedside. Failed repeat swallow eval this AM, remains NPO.     Present Symptoms:   Dyspnea:  No   Nausea/Vomiting:  No  Anxiety:   No  Fatigue:  No  Loss of appetite:  NPO but pt wants to eat  Pain: No  +urinary retention       MEDICATIONS  (STANDING):  albuterol/ipratropium for Nebulization 3 milliLiter(s) Nebulizer every 6 hours  dextrose 5%. 1000 milliLiter(s) (50 mL/Hr) IV Continuous <Continuous>  guaifenesin/dextromethorphan  Syrup 10 milliLiter(s) Oral every 4 hours  heparin  Injectable 5000 Unit(s) SubCutaneous every 8 hours  memantine 5 milliGRAM(s) Oral two times a day  piperacillin/tazobactam IVPB.. 3.375 Gram(s) IV Intermittent every 8 hours  saccharomyces boulardii 250 milliGRAM(s) Oral two times a day  tamsulosin 0.4 milliGRAM(s) Oral at bedtime    MEDICATIONS  (PRN):  ALBUTerol    90 MICROgram(s) HFA Inhaler 2 Puff(s) Inhalation every 4 hours PRN Shortness of Breath and/or Wheezing      PHYSICAL EXAM:    Vital Signs Last 24 Hrs  T(C): 36.4 (06 Dec 2019 08:31), Max: 36.4 (05 Dec 2019 23:54)  T(F): 97.6 (06 Dec 2019 08:31), Max: 97.6 (05 Dec 2019 23:54)  HR: 80 (06 Dec 2019 08:45) (75 - 84)  BP: 145/78 (06 Dec 2019 08:31) (101/66 - 145/78)  BP(mean): --  RR: 18 (06 Dec 2019 08:31) (18 - 18)  SpO2: 98% (06 Dec 2019 08:31) (96% - 99%)    General: elderly. Resting comfortably. No acute distress.    HEENT: mucous membrane  dry   Lungs: comfortable. non-labored. O2NC  CV: +s1/s2. RRR  GI: +bowel sound. abdomen soft, non-tender, non-distended   MSK: Moves all 4 extremities. No cyanosis or edema. weakness.    Neuro: nonfocal. Awake and alert. Intermittent confusion  Skin: warm and dry.        LABS:                          10.2   8.06  )-----------( 128      ( 05 Dec 2019 07:38 )             34.9     12-06    145  |  101  |  26.0<H>  ----------------------------<  90  3.9   |  34.0<H>  |  0.64    Ca    9.6      06 Dec 2019 09:12  Phos  2.4     12-05  Mg     2.2     12-05      RADIOLOGY & ADDITIONAL STUDIES:     [facility] 12/5/19  Comparison study dated 9/14/2019.    Axial images obtained, coronal and sagittal images computer reformatted.    Atrophic changes present. No evidence of mass effect midline shift   epidural or subdural collection. No sign of acute or recent hemorrhage.   No unusual calcifications present. Calvarium intact. Fluid levels both   maxillary sinuses. Mucosal thickening ethmoid sinuses. Mastoids   pneumatized. Enlarged empty sella.    IMPRESSION:    Limited exam. No evidence of hemorrhage or mass effect. When patient is   able to cooperate, recommend a follow-up study if clinically applicable.      ADVANCE DIRECTIVES: DNR/I MOLST completed on 11/28/19 Negative

## 2023-06-06 NOTE — ED ADULT TRIAGE NOTE - ESI TRIAGE ACUITY LEVEL, MLM
2 Gabapentin Counseling: I discussed with the patient the risks of gabapentin including but not limited to dizziness, somnolence, fatigue and ataxia.

## 2023-12-04 NOTE — ED PROVIDER NOTE - CROS ED NEURO ALL NEG
Pt discharge to home via lyft. All paperwork reviewed and agreed by pt. All belongings taken with pt. Denies any pain or discomfort upon discharge.    - - -

## 2024-03-22 NOTE — ED ADULT NURSE REASSESSMENT NOTE - NS ED NURSE REASSESS COMMENT FT1
Shiv Lee (:  1940) is a 83 y.o. male.    Subjective   SUBJECTIVE/OBJECTIVE:  Past Medical History:   Diagnosis Date    Abnormal EKG     Aortic stenosis     Atrial fibrillation (HCC)     Postop    Coronary artery disease     HTN (hypertension)     Infrarenal abdominal aortic aneurysm (AAA) without rupture (HCC) 2024    3.5 x 3.6 cm CT scan 2024    Left atrial dilatation     Mild    Mitral regurgitation     Trace    Nontraumatic retroperitoneal hematoma 2024    SBE (subacute bacterial endocarditis) prophylaxis candidate       Past Surgical History:   Procedure Laterality Date    AORTIC VALVE SURGERY  09    Dr. Villa    CARDIAC PROCEDURE N/A 2024    Left heart cath / coronary angiography performed by Dipika Curtis MD at [facility] CARDIAC CATH LAB    CARDIAC PROCEDURE N/A 2024    Percutaneous coronary intervention performed by Dipika Curtis MD at [facility] CARDIAC CATH LAB    CARDIAC PROCEDURE N/A 2024    Percutaneous coronary intervention performed by Amadou Santana DO at [facility] CARDIAC CATH LAB    CORONARY ARTERY BYPASS GRAFT  09    DIAGNOSTIC CARDIAC CATH LAB PROCEDURE  09    EYE SURGERY  2016      Family History   Problem Relation Age of Onset    Diabetes Mother     Heart Failure Mother     Cancer Father       Social History     Socioeconomic History    Marital status:    Occupational History    Occupation: RETIRED-    Tobacco Use    Smoking status: Never    Smokeless tobacco: Never   Vaping Use    Vaping Use: Never used   Substance and Sexual Activity    Alcohol use: Yes     Comment: one drink per day occassionally    Drug use: No    Sexual activity: Not Currently     Social Determinants of Health     Food Insecurity: No Food Insecurity (3/18/2024)    Hunger Vital Sign     Worried About Running Out of Food in the Last Year: Never true     Ran Out of Food in the Last Year: Never true   Transportation Needs: No Transportation Needs (3/18/2024)     pt. changed, turned, repositioned.

## 2024-07-02 NOTE — CONSULT NOTE ADULT - PROBLEM/RECOMMENDATION-4
surgery using electric clippers if the hair is in the same area where the procedure will occur. They should not shave you with a razor.  Give you antibiotics before your surgery starts. In most cases, you should get antibiotics within 60 minutes before the surgery starts and the antibiotics should be stopped within 24 hours after surgery.  Clean the skin at the site of your surgery with a special soap that kills germs.  Clean their hands and arms up to their elbows with an antiseptic agent just before the surgery.  Wear special hair covers, masks, gowns, and gloves during surgery to  keep the surgery area clean.  Clean their hands with soap and water or an alcohol-based hand rub before and after caring for each patient.             If you do not see your providers clean their hands, please     ask  them to do so.     What can I do to help prevent SSIs?  Before your surgery:  Tell your doctor about other medical problems you may have.  Health problems such as allergies, diabetes, and obesity could affect your surgery and your treatment.   Quit smoking. Patients who smoke get more infections. Talk to your doctor about how you can quit before your surgery.  Do not shave near where you will have surgery. Shaving with a razor can irritate your skin and make it easier to develop an infection.  At the time of your surgery:  Speak up if someone tries to shave you with a razor before surgery. Ask why you need to be shaved and talk with your surgeon if you have any concerns.  Ask if you will get antibiotics before surgery.  After your surgery:  Make sure that your healthcare providers clean their hands before examining you, either with soap and water or an alcohol-based hand rub.  Family and friends who visit you should not touch the surgical wound or dressings.  Family and friends should clean their hands with soap and water or an alcohol-based hand rub before and after visiting you. If you do not see them clean their hands, 
DISPLAY PLAN FREE TEXT
DISPLAY PLAN FREE TEXT

## 2024-10-07 NOTE — DISCHARGE NOTE NURSING/CASE MANAGEMENT/SOCIAL WORK - NSDCVIVACCINE_GEN_ALL_CORE_FT
No Vaccines Administered. Laser Recommendations: IPL treatment 3-5 sessions Detail Level: Zone Bleaching Agents Recommendations: Apply nightly Q2 month on, 1 month off , follow up in 3 months Detail Level: Generalized